# Patient Record
Sex: MALE | Race: WHITE | NOT HISPANIC OR LATINO | Employment: FULL TIME | URBAN - METROPOLITAN AREA
[De-identification: names, ages, dates, MRNs, and addresses within clinical notes are randomized per-mention and may not be internally consistent; named-entity substitution may affect disease eponyms.]

---

## 2018-12-17 PROBLEM — Z98.890 HISTORY OF ENDOSCOPY: Status: ACTIVE | Noted: 2018-12-14

## 2019-02-19 ENCOUNTER — OFFICE VISIT (OUTPATIENT)
Dept: FAMILY MEDICINE | Facility: CLINIC | Age: 29
End: 2019-02-19
Payer: COMMERCIAL

## 2019-02-19 ENCOUNTER — PATIENT MESSAGE (OUTPATIENT)
Dept: FAMILY MEDICINE | Facility: CLINIC | Age: 29
End: 2019-02-19

## 2019-02-19 VITALS
HEART RATE: 109 BPM | TEMPERATURE: 99 F | BODY MASS INDEX: 32.07 KG/M2 | WEIGHT: 224 LBS | SYSTOLIC BLOOD PRESSURE: 129 MMHG | HEIGHT: 70 IN | DIASTOLIC BLOOD PRESSURE: 85 MMHG

## 2019-02-19 DIAGNOSIS — J06.9 UPPER RESPIRATORY TRACT INFECTION, UNSPECIFIED TYPE: Primary | ICD-10-CM

## 2019-02-19 PROCEDURE — 99212 OFFICE O/P EST SF 10 MIN: CPT | Mod: ,,, | Performed by: INTERNAL MEDICINE

## 2019-02-19 PROCEDURE — 99212 PR OFFICE/OUTPT VISIT, EST, LEVL II, 10-19 MIN: ICD-10-PCS | Mod: ,,, | Performed by: INTERNAL MEDICINE

## 2019-02-19 RX ORDER — PREDNISONE 20 MG/1
20 TABLET ORAL DAILY
Qty: 5 TABLET | Refills: 0 | Status: SHIPPED | OUTPATIENT
Start: 2019-02-19 | End: 2019-02-24

## 2019-02-19 NOTE — PATIENT INSTRUCTIONS
Viral Upper Respiratory Illness (Adult)  You have a viral upper respiratory illness (URI), which is another term for the common cold. This illness is contagious during the first few days. It is spread through the air by coughing and sneezing. It may also be spread by direct contact (touching the sick person and then touching your own eyes, nose, or mouth). Frequent handwashing will decrease risk of spread. Most viral illnesses go away within 7 to 10 days with rest and simple home remedies. Sometimes the illness may last for several weeks. Antibiotics will not kill a virus, and they are generally not prescribed for this condition.    Home care  · If symptoms are severe, rest at home for the first 2 to 3 days. When you resume activity, don't let yourself get too tired.  · Avoid being exposed to cigarette smoke (yours or others).  · You may use acetaminophen or ibuprofen to control pain and fever, unless another medicine was prescribed. (Note: If you have chronic liver or kidney disease, have ever had a stomach ulcer or gastrointestinal bleeding, or are taking blood-thinning medicines, talk with your healthcare provider before using these medicines.) Aspirin should never be given to anyone under 18 years of age who is ill with a viral infection or fever. It may cause severe liver or brain damage.  · Your appetite may be poor, so a light diet is fine. Avoid dehydration by drinking 6 to 8 glasses of fluids per day (water, soft drinks, juices, tea, or soup). Extra fluids will help loosen secretions in the nose and lungs.  · Over-the-counter cold medicines will not shorten the length of time youre sick, but they may be helpful for the following symptoms: cough, sore throat, and nasal and sinus congestion. (Note: Do not use decongestants if you have high blood pressure.)  Follow-up care  Follow up with your healthcare provider, or as advised.  When to seek medical advice  Call your healthcare provider right away if any  of these occur:  · Cough with lots of colored sputum (mucus)  · Severe headache; face, neck, or ear pain  · Difficulty swallowing due to throat pain  · Fever of 100.4°F (38°C)  Call 911, or get immediate medical care  Call emergency services right away if any of these occur:  · Chest pain, shortness of breath, wheezing, or difficulty breathing  · Coughing up blood  · Inability to swallow due to throat pain  Date Last Reviewed: 9/13/2015  © 2541-4828 [employer]. [address] [place] [address]. All rights reserved. This information is not intended as a substitute for professional medical care. Always follow your healthcare professional's instructions.

## 2019-02-19 NOTE — PROGRESS NOTES
Subjective:       Patient ID: Swapnil Dillard is a 28 y.o. male.    Chief Complaint: Cough; Otalgia; and URI (upper resp )    Mr. Swapnil Dillard is a 28-year-old  gentleman who comes for same-day evaluation. He is having right-sided ear ache and feeling of cold and cough symptoms. Perhaps he might have had a fever 7 days back. He does not recall any significant or definite of exposure to anybody with influenza. He is nonsmoker. No underlying significant medical issues.    Social history has been reviewed and he has been recently unemployed.    Some mucus which is yellowish greenish or expectoration. No hemoptysis. No chest wall pain. No nausea vomiting or diarrhea.      Cough   This is a new problem. The current episode started in the past 7 days. The problem has been waxing and waning. The cough is productive of sputum. Associated symptoms include ear pain and myalgias. Pertinent negatives include no chest pain, fever (last week had fever), headaches, shortness of breath or weight loss. The treatment provided mild relief. There is no history of asthma, bronchiectasis, bronchitis, COPD, emphysema, environmental allergies or pneumonia.   Otalgia    There is pain in the right ear. This is a new problem. The current episode started in the past 7 days. There has been no fever. Pertinent negatives include no abdominal pain, diarrhea, ear discharge, headaches or hearing loss.       Past Medical History:   Diagnosis Date    Anxiety     Conjunctivitis     Corneal abrasion 2014    Depression     History of endoscopy 12/14/2018    Nissen fundoplication was formed. Wrap appears to be intact. Mild schatzki ring. Dilated. No gross lesions in the duodenal bulb and second part of the duodenum. Dr. 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   Tobacco Use    Smoking status: Never Smoker    Smokeless tobacco: Never Used   Substance and Sexual Activity    Alcohol use: Yes    Drug use: No    Sexual activity: Yes     Partners: Female   Other Topics Concern    Not on file   Social History Narrative    Not on file     Past Surgical History:   Procedure Laterality Date    LAPAROSCOPIC NISSEN FUNDOPLICATION       Family History   Problem Relation Age of Onset    Hypertension Father        Review of Systems   Constitutional: Negative for activity change, appetite change, fatigue, fever (last week had fever), unexpected weight change and weight loss.   HENT: Positive for congestion and ear pain. Negative for drooling, ear discharge, hearing loss, mouth sores and trouble swallowing.    Eyes: Negative for pain and visual disturbance.   Respiratory: Negative for chest tightness and shortness of breath.    Cardiovascular: Negative for chest pain, palpitations and leg swelling.   Gastrointestinal: Negative for abdominal distention, abdominal pain and diarrhea.   Endocrine: Negative for cold intolerance, heat intolerance and polydipsia.   Musculoskeletal: Positive for myalgias. Negative for arthralgias.   Allergic/Immunologic: Negative for environmental allergies, food allergies and immunocompromised state.   Neurological: Negative for dizziness, weakness and headaches.   Psychiatric/Behavioral: The patient is not nervous/anxious.          Objective:      Blood pressure 129/85, pulse 109, temperature 98.5 °F (36.9 °C), height 5' 10" (1.778 m), weight 101.6 kg (224 lb). Body mass index is 32.14 kg/m².  Physical Exam   Constitutional: He appears " well-developed. No distress.   HENT:   Head: Normocephalic and atraumatic.   Right Ear: External ear normal. No mastoid tenderness. Tympanic membrane is injected. A middle ear effusion is present.   Left Ear: External ear normal. No mastoid tenderness. Tympanic membrane is not injected.   Ears:    Mouth/Throat: Mucous membranes are normal. Mucous membranes are not pale and not dry. Posterior oropharyngeal erythema present. No oropharyngeal exudate, posterior oropharyngeal edema or tonsillar abscesses.   Eyes: Right eye exhibits no discharge. Left eye exhibits no discharge. No scleral icterus.   Neck: Normal range of motion. Neck supple. No JVD present. No tracheal deviation present. No thyromegaly present.   Cardiovascular: Normal rate and regular rhythm.   Pulmonary/Chest: Effort normal and breath sounds normal.   Abdominal: Soft. He exhibits no distension. There is no tenderness.   Musculoskeletal: He exhibits no deformity.   Neurological: He is alert.   Skin: Skin is warm and dry. No rash noted. He is not diaphoretic. No erythema.         Assessment:       1. Upper respiratory tract infection, unspecified type           Patient possibly has some otitis media with URI. Most likely viral. He'll be treated conservatively. No antibiotic at this point.      Plan:           Upper respiratory tract infection, unspecified type    Increase your water intake to 64-80 oz daily    Nasal Saline spray (Over the counter King spray or Ayr)  2 sprays each nostril 2-3 times a day for nasal congestion    Tylenol 500 mg 2 tablets or Ibuprofen 200 mg 2 tablets every 4-6 hours as needed for fever, headaches, sore throat, ear pain, bodyaches, and/or nasal/sinus inflammation    Warm salt water gargles with 1/2 cup water and 1 tablespoon salt every 4 hours    Warm tea with honey and lemon    Follow up with PCP in 1 week if symptoms do not resolve        Patient has been advised to send us message through the portal if he is not better  by Friday.  No antibiotics at this point.      No current outpatient medications on file.

## 2019-10-23 ENCOUNTER — OFFICE VISIT (OUTPATIENT)
Dept: FAMILY MEDICINE | Facility: CLINIC | Age: 29
End: 2019-10-23
Payer: COMMERCIAL

## 2019-10-23 VITALS
SYSTOLIC BLOOD PRESSURE: 110 MMHG | DIASTOLIC BLOOD PRESSURE: 76 MMHG | WEIGHT: 219 LBS | HEART RATE: 89 BPM | BODY MASS INDEX: 31.35 KG/M2 | OXYGEN SATURATION: 97 % | HEIGHT: 70 IN

## 2019-10-23 DIAGNOSIS — F41.8 DEPRESSION WITH ANXIETY: ICD-10-CM

## 2019-10-23 DIAGNOSIS — Z00.00 PREVENTATIVE HEALTH CARE: ICD-10-CM

## 2019-10-23 DIAGNOSIS — N52.9 ERECTILE DYSFUNCTION, UNSPECIFIED ERECTILE DYSFUNCTION TYPE: Primary | ICD-10-CM

## 2019-10-23 PROCEDURE — 99214 OFFICE O/P EST MOD 30 MIN: CPT | Mod: S$GLB,,, | Performed by: NURSE PRACTITIONER

## 2019-10-23 PROCEDURE — 99214 PR OFFICE/OUTPT VISIT, EST, LEVL IV, 30-39 MIN: ICD-10-PCS | Mod: S$GLB,,, | Performed by: NURSE PRACTITIONER

## 2019-10-23 NOTE — PATIENT INSTRUCTIONS
Understanding Erectile Dysfunction    Erectile dysfunction (ED) is a problem getting an erection firm enough or keeping it long enough for intercourse. The problem can happen to any man at any age. But health problems that can lead to ED become more common as a man ages. Up to half of men over age 40 experience ED at some point.  Causes of ED  ED can have many causes. Most are physical. Some are emotional issues. Often, a combination of causes is involved. Causes of ED may include:  · Medical conditions such as diabetes or depression  · Smoking tobacco or marijuana  · Drinking too much alcohol  · Side effects of medications  · Injury to nerves or blood vessels  · Emotional issues such as stress or relationship problems  ED can be treated  Prescription medications for ED are available. They help many men who try them. Depending upon the cause of the ED, though, medications may not be enough. In these cases, other treatment options are available. These include erectile aids and surgery. Your health care provider can tell you more about the treatment that is right for you. And new treatments for ED are being studied. No matter what the treatment you decide on, stay in touch with your doctor. If your symptoms persist, he or she may be able to adjust your current treatment or try something new.  Date Last Reviewed: 1/1/2017  © 5180-6687 [employer]. [address] [place] [address], [place] [address]. All rights reserved. This information is not intended as a substitute for professional medical care. Always follow your healthcare professional's instructions.

## 2019-10-23 NOTE — PROGRESS NOTES
SUBJECTIVE:      Patient ID: Swapnil Dillard is a 28 y.o. male.    Chief Complaint: Anxiety and Depression    Mr Dillard is new to me here today to discuss a few concerns. He has a long history of depression/anxiety. Started treatment in high school with counseling and paxil. He was changed to lexapro not long after taking the paxil. He was on lexapro for 12 years and stopped it one year ago due to sexual side effects. For the past year he has been dealing with ED, has tried conservative measures to help but nothing has worked. He is happily  and denies any marital issues except the ED which he says is starting to cause problems. Over the past 6 months he has been under increased stress. Has had 2 new jobs in that short period of time. His PHQ9 is a 16 today. He does not have a suicidal plan and says he would never commit suicide but has days where he feels he would be better dead. He has not had labs in several years and has not gone to counseling in over 5 years.     Anxiety   Presents for initial visit. Onset was more than 5 years ago. The problem has been gradually worsening. Symptoms include decreased concentration, depressed mood, excessive worry, insomnia, irritability, nervous/anxious behavior and panic. Patient reports no chest pain, confusion, dizziness, feeling of choking, palpitations, shortness of breath or suicidal ideas. Symptoms occur most days. The severity of symptoms is moderate. The symptoms are aggravated by work stress and social activities. The quality of sleep is fair. Nighttime awakenings: one to two.     His past medical history is significant for anxiety/panic attacks and depression. Past treatments include SSRIs. The treatment provided mild relief.   Depression   Visit Type: initial  Onset of symptoms: more than 5 years ago  Progression since onset: gradually worsening  Patient presents with the following symptoms: decreased concentration, depressed mood, excessive worry, feelings  of hopelessness, feelings of worthlessness, insomnia, irritability, nervousness/anxiety, panic and thoughts of death.  Patient is not experiencing: choking sensation, confusion, palpitations, shortness of breath, suicidal ideas, suicidal planning and weight gain.  Frequency of symptoms: most days   Severity: moderate   Aggravated by: work stress  Sleep quality: fair  Nighttime awakenings: one to two  Patient has a history of: anxiety/panic attacks and depression  Treatment tried: SSRI  Compliance with treatment: good  Improvement on treatment: mild      Erectile Dysfunction   The current episode started more than 1 year ago. The problem is unchanged. The nature of his difficulty is maintaining erection. Non-physiologic factors contributing to erectile dysfunction are anxiety. He reports his erection duration to be 1 to 5 minutes. Irritative symptoms do not include frequency, nocturia or urgency. Pertinent negatives include no chills or hematuria. The symptoms are aggravated by medications. Past treatments include nothing. 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   Social Needs    Financial resource strain: Not on file    Food insecurity:     Worry: Not on file     Inability: Not on file    Transportation needs:     Medical: Not on file     Non-medical: Not on file   Tobacco Use    Smoking status: Never Smoker    Smokeless tobacco: Never Used   Substance and Sexual Activity    Alcohol use: Yes    Drug use: No    Sexual activity: Yes     Partners: Female   Lifestyle     Physical activity:     Days per week: Not on file     Minutes per session: Not on file    Stress: Not on file   Relationships    Social connections:     Talks on phone: Not on file     Gets together: Not on file     Attends Church service: Not on file     Active member of club or organization: Not on file     Attends meetings of clubs or organizations: Not on file     Relationship status: Not on file   Other Topics Concern    Not on file   Social History Narrative    Not on file     Current Outpatient Medications   Medication Sig Dispense Refill    vilazodone (VIIBRYD) 10 mg (7)- 20 mg (23) DsPk Take 10mg daily for 7 days then 20mg daily 30 tablet 0     No current facility-administered medications for this visit.      Review of patient's allergies indicates:  No Known Allergies   Past Medical History:   Diagnosis Date    Anxiety     Conjunctivitis     Corneal abrasion 2014    Depression     History of endoscopy 12/14/2018    Nissen fundoplication was formed. Wrap appears to be intact. Mild schatzki ring. Dilated. No gross lesions in the duodenal bulb and second part of the duodenum. Dr. Elmira Zaman     Past Surgical History:   Procedure Laterality Date    LAPAROSCOPIC NISSEN FUNDOPLICATION         Review of Systems   Constitutional: Positive for irritability. Negative for appetite change, chills, diaphoresis, unexpected weight change and weight gain.   HENT: Negative for ear discharge, facial swelling, hearing loss, nosebleeds and trouble swallowing.    Eyes: Negative for photophobia, pain and visual disturbance.   Respiratory: Negative for apnea, choking and shortness of breath.    Cardiovascular: Negative for chest pain and palpitations.   Gastrointestinal: Negative for abdominal pain, blood in stool and vomiting.   Endocrine: Negative for polyphagia.   Genitourinary: Negative for difficulty urinating, frequency, hematuria, nocturia and urgency.        ED   Musculoskeletal: Negative for gait  "problem and joint swelling.   Skin: Negative for pallor.   Neurological: Negative for dizziness, seizures, speech difficulty, weakness and headaches.   Hematological: Does not bruise/bleed easily.   Psychiatric/Behavioral: Positive for decreased concentration, depression, dysphoric mood and sleep disturbance. Negative for agitation, confusion, self-injury and suicidal ideas. The patient is nervous/anxious and has insomnia.       OBJECTIVE:      Vitals:    10/23/19 1021   BP: 110/76   Pulse: (!) 117   SpO2: 97%   Weight: 99.3 kg (219 lb)   Height: 5' 10" (1.778 m)     Physical Exam   Constitutional: He is oriented to person, place, and time. He appears well-developed and well-nourished.   HENT:   Head: Atraumatic.   Eyes: Pupils are equal, round, and reactive to light. Conjunctivae and EOM are normal.   Neck: Neck supple. No thyromegaly present.   Cardiovascular: Normal rate, regular rhythm, normal heart sounds and intact distal pulses.   Pulmonary/Chest: Effort normal and breath sounds normal.   Abdominal: Soft. Bowel sounds are normal. He exhibits no distension. There is no tenderness.   Musculoskeletal: Normal range of motion. He exhibits no edema.   Lymphadenopathy:     He has no cervical adenopathy.   Neurological: He is alert and oriented to person, place, and time.   Skin: Skin is warm and dry.   Psychiatric: His speech is normal and behavior is normal. Judgment and thought content normal. Cognition and memory are normal. He exhibits a depressed mood.   Nursing note and vitals reviewed.     Assessment:       1. Erectile dysfunction, unspecified erectile dysfunction type    2. Depression with anxiety    3. Preventative health care        Plan:       Erectile dysfunction, unspecified erectile dysfunction type  -     PSA, Screening; Future; Expected date: 10/23/2019  -     Testosterone; Future; Expected date: 10/23/2019    Depression with anxiety  -   start  vilazodone (VIIBRYD) 10 mg (7)- 20 mg (23) DsPk; Take " 10mg daily for 7 days then 20mg daily  Dispense: 30 tablet; Refill: 0  -     Vitamin B12; Future; Expected date: 10/23/2019  -     Vitamin D; Future; Expected date: 10/23/2019  Long discussion with patient regarding counseling/therapy. He is addiment about not wanting therapy at this time. He feels he is stable and when he was on his medication he feels he was doing well. He would like to restart medication and f/u. He says if he is not improved he will consider therapy. He has no suicidal ideas or plans today. He says he has a good relationship with his wife and would not harm himself.      Preventative health care  -     CBC auto differential; Future; Expected date: 10/23/2019  -     Comprehensive metabolic panel; Future; Expected date: 10/23/2019  -     Lipid panel; Future; Expected date: 10/23/2019  -     TSH; Future; Expected date: 10/23/2019  -     Urinalysis; Future; Expected date: 10/23/2019        Follow up in about 4 weeks (around 11/20/2019) for wellness .      10/23/2019 TOBIN Combs, FNP

## 2019-10-29 ENCOUNTER — TELEPHONE (OUTPATIENT)
Dept: FAMILY MEDICINE | Facility: CLINIC | Age: 29
End: 2019-10-29

## 2019-10-29 NOTE — TELEPHONE ENCOUNTER
The following medication needs a prior authorization:     Medication Name: vilazodone    Dosage: 10-20mg mg    Frequency: daily    Directions for use: take 10mg daily for 7 days then 20mg daily.    Diagnosis: F41.8 Depression with anxiety    Is the request for a reauthorization? no    Is the patient currently stable on therapy? New therapy    Please list all therapeutic alternatives previously used with start/end dates and outcome:    Lexapro [id number] cause erectile dysfunction

## 2019-11-15 ENCOUNTER — TELEPHONE (OUTPATIENT)
Dept: FAMILY MEDICINE | Facility: CLINIC | Age: 29
End: 2019-11-15

## 2019-11-15 LAB
25(OH)D3+25(OH)D2 SERPL-MCNC: 19.2 NG/ML (ref 30–100)
ALBUMIN SERPL-MCNC: 4.4 G/DL (ref 3.5–5.5)
ALBUMIN/GLOB SERPL: 1.6 {RATIO} (ref 1.2–2.2)
ALP SERPL-CCNC: 71 IU/L (ref 39–117)
ALT SERPL-CCNC: 22 IU/L (ref 0–44)
APPEARANCE UR: CLEAR
AST SERPL-CCNC: 18 IU/L (ref 0–40)
BASOPHILS # BLD AUTO: 0 X10E3/UL (ref 0–0.2)
BASOPHILS NFR BLD AUTO: 1 %
BILIRUB SERPL-MCNC: 0.3 MG/DL (ref 0–1.2)
BILIRUB UR QL STRIP: NEGATIVE
BUN SERPL-MCNC: 11 MG/DL (ref 6–20)
BUN/CREAT SERPL: 10 (ref 9–20)
CALCIUM SERPL-MCNC: 9.4 MG/DL (ref 8.7–10.2)
CHLORIDE SERPL-SCNC: 100 MMOL/L (ref 96–106)
CHOLEST SERPL-MCNC: 217 MG/DL (ref 100–199)
CO2 SERPL-SCNC: 23 MMOL/L (ref 20–29)
COLOR UR: YELLOW
CREAT SERPL-MCNC: 1.09 MG/DL (ref 0.76–1.27)
EOSINOPHIL # BLD AUTO: 0.1 X10E3/UL (ref 0–0.4)
EOSINOPHIL NFR BLD AUTO: 2 %
ERYTHROCYTE [DISTWIDTH] IN BLOOD BY AUTOMATED COUNT: 13.5 % (ref 12.3–15.4)
GLOBULIN SER CALC-MCNC: 2.7 G/DL (ref 1.5–4.5)
GLUCOSE SERPL-MCNC: 106 MG/DL (ref 65–99)
GLUCOSE UR QL: NEGATIVE
HCT VFR BLD AUTO: 44.3 % (ref 37.5–51)
HDLC SERPL-MCNC: 54 MG/DL
HGB BLD-MCNC: 15 G/DL (ref 13–17.7)
HGB UR QL STRIP: NEGATIVE
IMM GRANULOCYTES # BLD AUTO: 0 X10E3/UL (ref 0–0.1)
IMM GRANULOCYTES NFR BLD AUTO: 0 %
KETONES UR QL STRIP: NEGATIVE
LDLC SERPL CALC-MCNC: 131 MG/DL (ref 0–99)
LEUKOCYTE ESTERASE UR QL STRIP: NEGATIVE
LYMPHOCYTES # BLD AUTO: 2.1 X10E3/UL (ref 0.7–3.1)
LYMPHOCYTES NFR BLD AUTO: 33 %
MCH RBC QN AUTO: 31.1 PG (ref 26.6–33)
MCHC RBC AUTO-ENTMCNC: 33.9 G/DL (ref 31.5–35.7)
MCV RBC AUTO: 92 FL (ref 79–97)
MICRO URNS: NORMAL
MONOCYTES # BLD AUTO: 0.5 X10E3/UL (ref 0.1–0.9)
MONOCYTES NFR BLD AUTO: 8 %
NEUTROPHILS # BLD AUTO: 3.5 X10E3/UL (ref 1.4–7)
NEUTROPHILS NFR BLD AUTO: 56 %
NITRITE UR QL STRIP: NEGATIVE
PH UR STRIP: 7.5 [PH] (ref 5–7.5)
PLATELET # BLD AUTO: 285 X10E3/UL (ref 150–450)
POTASSIUM SERPL-SCNC: 4.5 MMOL/L (ref 3.5–5.2)
PROT SERPL-MCNC: 7.1 G/DL (ref 6–8.5)
PROT UR QL STRIP: NEGATIVE
PSA SERPL-MCNC: 1 NG/ML (ref 0–4)
RBC # BLD AUTO: 4.83 X10E6/UL (ref 4.14–5.8)
SODIUM SERPL-SCNC: 140 MMOL/L (ref 134–144)
SP GR UR: 1.02 (ref 1–1.03)
SPECIMEN STATUS REPORT: NORMAL
TESTOST SERPL-MCNC: 364 NG/DL (ref 264–916)
TRIGL SERPL-MCNC: 160 MG/DL (ref 0–149)
TSH SERPL DL<=0.005 MIU/L-ACNC: 2.25 UIU/ML (ref 0.45–4.5)
UROBILINOGEN UR STRIP-MCNC: 0.2 MG/DL (ref 0.2–1)
VIT B12 SERPL-MCNC: 256 PG/ML (ref 232–1245)
VLDLC SERPL CALC-MCNC: 32 MG/DL (ref 5–40)
WBC # BLD AUTO: 6.3 X10E3/UL (ref 3.4–10.8)

## 2019-11-18 ENCOUNTER — OFFICE VISIT (OUTPATIENT)
Dept: FAMILY MEDICINE | Facility: CLINIC | Age: 29
End: 2019-11-18
Payer: COMMERCIAL

## 2019-11-18 VITALS
HEIGHT: 70 IN | HEART RATE: 90 BPM | SYSTOLIC BLOOD PRESSURE: 100 MMHG | OXYGEN SATURATION: 96 % | WEIGHT: 219 LBS | BODY MASS INDEX: 31.35 KG/M2 | DIASTOLIC BLOOD PRESSURE: 80 MMHG

## 2019-11-18 DIAGNOSIS — E78.00 ELEVATED LDL CHOLESTEROL LEVEL: ICD-10-CM

## 2019-11-18 DIAGNOSIS — E53.8 VITAMIN B 12 DEFICIENCY: ICD-10-CM

## 2019-11-18 DIAGNOSIS — Z00.00 PREVENTATIVE HEALTH CARE: Primary | ICD-10-CM

## 2019-11-18 DIAGNOSIS — F41.8 DEPRESSION WITH ANXIETY: ICD-10-CM

## 2019-11-18 DIAGNOSIS — E55.9 VITAMIN D DEFICIENCY: ICD-10-CM

## 2019-11-18 PROCEDURE — 99395 PREV VISIT EST AGE 18-39: CPT | Mod: S$GLB,,, | Performed by: NURSE PRACTITIONER

## 2019-11-18 PROCEDURE — 99395 PR PREVENTIVE VISIT,EST,18-39: ICD-10-PCS | Mod: S$GLB,,, | Performed by: NURSE PRACTITIONER

## 2019-11-18 RX ORDER — VILAZODONE HYDROCHLORIDE 20 MG/1
20 TABLET ORAL DAILY
Qty: 30 TABLET | Refills: 3 | Status: SHIPPED | OUTPATIENT
Start: 2019-11-18 | End: 2020-03-23

## 2019-11-18 RX ORDER — ASPIRIN 325 MG
50000 TABLET, DELAYED RELEASE (ENTERIC COATED) ORAL
Qty: 8 CAPSULE | Refills: 0 | Status: SHIPPED | OUTPATIENT
Start: 2019-11-18 | End: 2021-08-20 | Stop reason: ALTCHOICE

## 2019-11-18 NOTE — PATIENT INSTRUCTIONS
Low-Cholesterol Diet  Your body needs cholesterol to build new cells and create certain hormones. There are 2 kinds of cholesterol in your blood:     · HDL (good) cholesterol. This prevents fat deposits (plaque) from building up in your arteries. In this way it protects against heart disease and stroke.  · LDL (bad) cholesterol. This stays in your body and sticks to artery walls. Over time it may block blood flow to the heart and brain. This can cause a heart attack or stroke.  The cholesterol in your blood comes from 2 sources: cholesterol in food that you eat and cholesterol that your liver makes. You should limit the amount of cholesterol in your diet. But the cholesterol that your body makes has the greatest disease risk. And your body makes more cholesterol when your diet is high in bad fats (saturated and trans fats). There are 2 kinds of fats you can eat:  · Good fats, or unsaturated fats (mono-unsaturated and poly-unsaturated). They raise the level of good cholesterol and lower the level of bad cholesterol. Good fats are found in vegetable oils such as olive, sunflower, corn, and soybean oils, and in nuts and seeds.  · Bad fats, or saturated fats (including foods high in cholesterol) and trans fats. These raise your risk of disease. They lower the good cholesterol and raise the level of bad cholesterol. Bad fats are found in animal products, including meat, whole-milk dairy products, and butter. Some plants are also high in bad fats (coconut and palm plants). Trans fats are found in hard (stick) margarines. They are also in many fast foods and commercially baked goods. Soft margarine sold in tubs has fewer trans fats and is safer to use.  High blood cholesterol is usually due to a diet high in saturated fat, along with not being physically active. In some cases, genetics plays a role in causing high cholesterol. The tips below will help you create healthy eating habits that will help lower your blood  cholesterol level.  Create a diet high in good fats, low in bad fats (and low in cholesterol)  The following steps will help you create a diet high in good fats and low in bad fats:  · Talk with your doctor before starting a low cholesterol diet or weight loss program.  · Learn to read nutrition labels and select appropriate portion sizes.  · When cooking, use plant-based unsaturated vegetable oils (sunflower, corn, soybean, canola, peanut, and olive oils).  · Avoid saturated fats found in animal products such as meat, dairy (whole-milk, cheese and ice cream), poultry skin, and egg yolks. Plants high in saturated oils include coconut oil, palm oil, and palm kernel oil.  · If you eat meat, choose smaller portions and lean cuts, such as round, kristel, sirloin, or loin. Eat more meatless meals.  · Replace meat with fish at least 2 times a week. Fish is an important source of the unsaturated fat called omega-3 fatty acids. This fat has potential to lower the risk of heart disease.  · Replace whole-milk dairy products with low-fat or nonfat products. Try soy products. Soy helps to reduce total cholesterol.  · Supplement your diet with protective fibers. Eat nuts, seeds, and whole grains rather than white rice and bread. These foods lower both cholesterol and triglyceride levels. (Triglycerides are another fat found in the blood.) Walnuts are one of the best sources of omega-3 fatty acids.  · Eat plenty of fresh fruits and vegetables daily.  · Avoid fast foods and commercial baked goods. Assume they contain saturated fat unless labeled otherwise.  Date Last Reviewed: 8/1/2016  © 6725-8535 [employer]. [address] [place] [address], [place] [address]. All rights reserved. This information is not intended as a substitute for professional medical care. Always follow your healthcare professional's instructions.

## 2019-11-18 NOTE — PROGRESS NOTES
SUBJECTIVE:      Patient ID: Swapnil Dillard is a 29 y.o. male.    Chief Complaint: Annual Exam    Mr Dillard is here today for his annual exam and f/u on depression/anxiety. He is doing well on the viibryd. PHQ is a 2 today. He is not having any side effects from the medication. Vit D and B12 low on labs, will discuss replacement with patient. Lipids a little elevated, will discuss diet modification     Anxiety   Presents for follow-up visit. Onset was more than 5 years ago. The problem has been gradually worsening. Symptoms include depressed mood and excessive worry. Patient reports no chest pain, confusion, decreased concentration, dizziness, feeling of choking, insomnia, irritability, nervous/anxious behavior, palpitations, panic, shortness of breath or suicidal ideas. Symptoms occur occasionally. The severity of symptoms is mild. The symptoms are aggravated by work stress and social activities. The quality of sleep is good. Nighttime awakenings: occasional.     His past medical history is significant for depression. Past treatments include SSRIs. The treatment provided mild relief. Compliance with medications is %.   Depression   Visit Type: follow-up  Patient presents with the following symptoms: depressed mood and excessive worry.  Patient is not experiencing: choking sensation, confusion, decreased concentration, feelings of hopelessness, feelings of worthlessness, insomnia, irritability, nervousness/anxiety, palpitations, panic, shortness of breath, suicidal ideas, suicidal planning, thoughts of death and weight gain.  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   Social Needs    Financial resource strain: Not on file    Food insecurity:     Worry: Not on file     Inability: Not on file    Transportation needs:     Medical: Not on file     Non-medical: Not on file   Tobacco Use    Smoking status: Never Smoker    Smokeless tobacco: Never Used   Substance and Sexual Activity    Alcohol use: Yes    Drug use: No    Sexual activity: Yes     Partners: Female   Lifestyle    Physical activity:     Days per week: Not on file     Minutes per session: Not on file    Stress: Not on file   Relationships    Social connections:     Talks on phone: Not on file     Gets together: Not on file     Attends [employer] service: Not on file     Active member of club or organization: Not on file     Attends meetings of clubs or organizations: Not on file     Relationship status: Not on file   Other Topics Concern    Not on file   Social History Narrative    Not on file     Current Outpatient Medications   Medication Sig Dispense Refill    cholecalciferol, vitamin D3, 50,000 unit capsule Take 1 capsule (50,000 Units total) by mouth every 7 days. 8 capsule 0    vilazodone (VIIBRYD) 20 mg Tab Take 1 tablet (20 mg total) by mouth once daily. 30 tablet 3     No current facility-administered medications for this visit.      Review of patient's allergies indicates:  No Known Allergies   Past Medical History:   Diagnosis Date    Anxiety     Conjunctivitis     Corneal abrasion 2014    Depression     History of endoscopy 12/14/2018    Nissen fundoplication was formed. Wrap appears to be intact. Mild schatzki ring. Dilated. No gross lesions in the duodenal bulb and second part of the duodenum. Dr. Elmira Zaman     Past Surgical History:   Procedure Laterality Date     "LAPAROSCOPIC NISSEN FUNDOPLICATION         Review of Systems   Constitutional: Negative for appetite change, diaphoresis, irritability, unexpected weight change and weight gain.   HENT: Negative for ear discharge, facial swelling, hearing loss, nosebleeds and trouble swallowing.    Eyes: Negative for photophobia, pain and visual disturbance.   Respiratory: Negative for apnea, choking, shortness of breath and wheezing.    Cardiovascular: Negative for chest pain and palpitations.   Gastrointestinal: Negative for abdominal pain, blood in stool and vomiting.   Endocrine: Negative for polyphagia.   Genitourinary: Negative for difficulty urinating.   Musculoskeletal: Negative for arthralgias, gait problem and joint swelling.   Skin: Negative for pallor.   Neurological: Negative for dizziness, seizures and speech difficulty.   Hematological: Does not bruise/bleed easily.   Psychiatric/Behavioral: Positive for depression. Negative for agitation, confusion, decreased concentration, dysphoric mood, self-injury, sleep disturbance and suicidal ideas. The patient is not nervous/anxious and does not have insomnia.       OBJECTIVE:      Vitals:    11/18/19 0935   BP: 100/80   Pulse: 90   SpO2: 96%   Weight: 99.3 kg (219 lb)   Height: 5' 10" (1.778 m)     Physical Exam   Constitutional: He is oriented to person, place, and time. He appears well-developed and well-nourished.   HENT:   Head: Atraumatic.   Eyes: Conjunctivae are normal.   Neck: Neck supple.   Cardiovascular: Normal rate, regular rhythm, normal heart sounds and intact distal pulses.   Pulmonary/Chest: Effort normal and breath sounds normal.   Abdominal: Soft. Bowel sounds are normal. He exhibits no distension.   Musculoskeletal: Normal range of motion.   Neurological: He is alert and oriented to person, place, and time.   Skin: Skin is warm and dry.   Psychiatric: He has a normal mood and affect. His speech is normal and behavior is normal. Thought content normal. "   Nursing note and vitals reviewed.      Office Visit on 10/23/2019   Component Date Value Ref Range Status    WBC 11/14/2019 6.3  3.4 - 10.8 x10E3/uL Final    RBC 11/14/2019 4.83  4.14 - 5.80 x10E6/uL Final    Hemoglobin 11/14/2019 15.0  13.0 - 17.7 g/dL Final    Hematocrit 11/14/2019 44.3  37.5 - 51.0 % Final    Mean Corpuscular Volume 11/14/2019 92  79 - 97 fL Final    Mean Corpuscular Hemoglobin 11/14/2019 31.1  26.6 - 33.0 pg Final    Mean Corpuscular Hemoglobin Conc 11/14/2019 33.9  31.5 - 35.7 g/dL Final    RDW 11/14/2019 13.5  12.3 - 15.4 % Final    Platelets 11/14/2019 285  150 - 450 x10E3/uL Final    Neutrophils 11/14/2019 56  Not Estab. % Final    Lymph% 11/14/2019 33  Not Estab. % Final    Mono% 11/14/2019 8  Not Estab. % Final    Eosinophil% 11/14/2019 2  Not Estab. % Final    Basophil% 11/14/2019 1  Not Estab. % Final    Neutrophils Absolute 11/14/2019 3.5  1.4 - 7.0 x10E3/uL Final    Lymph # 11/14/2019 2.1  0.7 - 3.1 x10E3/uL Final    Mono # 11/14/2019 0.5  0.1 - 0.9 x10E3/uL Final    Eos # 11/14/2019 0.1  0.0 - 0.4 x10E3/uL Final    Baso # 11/14/2019 0.0  0.0 - 0.2 x10E3/uL Final    Immature Granulocytes 11/14/2019 0  Not Estab. % Final    Immature Grans (Abs) 11/14/2019 0.0  0.0 - 0.1 x10E3/uL Final    Glucose 11/14/2019 106* 65 - 99 mg/dL Final    BUN, Bld 11/14/2019 11  6 - 20 mg/dL Final    Creatinine 11/14/2019 1.09  0.76 - 1.27 mg/dL Final    eGFR if non African American 11/14/2019 90  >59 mL/min/1.73 Final    eGFR if  11/14/2019 104  >59 mL/min/1.73 Final    BUN/Creatinine Ratio 11/14/2019 10  9 - 20 Final    Sodium 11/14/2019 140  134 - 144 mmol/L Final    Potassium 11/14/2019 4.5  3.5 - 5.2 mmol/L Final    Chloride 11/14/2019 100  96 - 106 mmol/L Final    CO2 11/14/2019 23  20 - 29 mmol/L Final    Calcium 11/14/2019 9.4  8.7 - 10.2 mg/dL Final    Total Protein 11/14/2019 7.1  6.0 - 8.5 g/dL Final    Albumin 11/14/2019 4.4  3.5 - 5.5 g/dL  Final    Globulin, Total 11/14/2019 2.7  1.5 - 4.5 g/dL Final    Albumin/Globulin Ratio 11/14/2019 1.6  1.2 - 2.2 Final    Total Bilirubin 11/14/2019 0.3  0.0 - 1.2 mg/dL Final    Alkaline Phosphatase 11/14/2019 71  39 - 117 IU/L Final    AST 11/14/2019 18  0 - 40 IU/L Final    ALT 11/14/2019 22  0 - 44 IU/L Final    Cholesterol 11/14/2019 217* 100 - 199 mg/dL Final    Triglycerides 11/14/2019 160* 0 - 149 mg/dL Final    HDL 11/14/2019 54  >39 mg/dL Final    VLDL Cholesterol [place] 11/14/2019 32  5 - 40 mg/dL Final    LDL Calculated 11/14/2019 131* 0 - 99 mg/dL Final    TSH 11/14/2019 2.250  0.450 - 4.500 uIU/mL Final    Specific [place], UA 11/14/2019 1.020  1.005 - 1.030 Final    pH, UA 11/14/2019 7.5  5.0 - 7.5 Final    Color, UA 11/14/2019 Yellow  Yellow Final    Clarity, UA 11/14/2019 Clear  Clear Final    Leukocytes, UA 11/14/2019 Negative  Negative Final    Protein, UA 11/14/2019 Negative  Negative/Trace Final    Glucose, UA 11/14/2019 Negative  Negative Final    Ketones, UA 11/14/2019 Negative  Negative Final    Occult Blood UA 11/14/2019 Negative  Negative Final    Bilirubin, UA 11/14/2019 Negative  Negative Final    Urobilinogen, UA 11/14/2019 0.2  0.2 - 1.0 mg/dL Final    Nitrite, UA 11/14/2019 Negative  Negative Final    Microscopic Examination 11/14/2019 Comment   Final    Microscopic not indicated and not performed.    PSA - [facility] 11/14/2019 1.0  0.0 - 4.0 ng/mL Final    Comment: Roche ECLIA methodology.  According to the American Urological Association, Serum PSA should  decrease and remain at undetectable levels after radical  prostatectomy. The AUA defines biochemical recurrence as an initial  PSA value 0.2 ng/mL or greater followed by a subsequent confirmatory  PSA value 0.2 ng/mL or greater.  Values obtained with different assay methods or kits cannot be used  interchangeably. Results cannot be interpreted as absolute evidence  of the presence or absence of malignant  disease.      Testosterone 11/14/2019 364  264 - 916 ng/dL Final    Comment: Adult male reference interval is based on a population of  healthy nonobese males (BMI <30) between 19 and 39 years old.  Taqueria, et.al. JCEM 2017,102;9002-6831. PMID: 75516465.      Vitamin B-12 11/14/2019 256  232 - 1,245 pg/mL Final    Vit D, 25-Hydroxy 11/14/2019 19.2* 30.0 - 100.0 ng/mL Final    Comment: Vitamin D deficiency has been defined by the [place] of  [employer] and an Endocrine Society practice guideline as a  level of serum 25-OH vitamin D less than 20 ng/mL (1,2).  The Endocrine Society went on to further define vitamin D  insufficiency as a level between 21 and 29 ng/mL (2).  1. IOM ([place] of [employer]). 2010. Dietary reference     intakes for calcium and D. [place]: The     National Academies Press.  2. Sol MF, Adonis NC, Kweis BRADY, et al.     Evaluation, treatment, and prevention of vitamin D     deficiency: an Endocrine Society clinical practice     guideline. JCEM. 2011 Jul; 96(7):1911-30.      Specimen Status Report 11/14/2019 Comment   Final    Comment: Verbal Order  See below:  Comment:  Please provide requested information and fax to [phone].  The [place] Code of Federal Regulations requires a written and  signed request be forwarded to a laboratory following a verbal order  of a laboratory test.  Please assist us to meet this requirement and  to complete our records.  Date:______________________________  ICD-9/10 Diagnosis Code(s):___________________________________________  Physician or Authorized Designee:_____________________________________                                                Please Print  Physician or Authorized Designee Signature:  ______________________________________________________________________  Your Signature Confirms Your Order Of The Test(s) Listed  Additional Test(s) Requested  Comment:  Test(s) added per ANNIE ARBOLEDA at account  11-  Logged by Mary Lara  Test# [id number] Hemoglobin A1c  Diagnosis Codes Provided     N52.9      Z00.00     F41.8      Hemoglobin A1C 11/14/2019 5.6  4.8 - 5.6 % Final    Comment:          Prediabetes: 5.7 - 6.4           Diabetes: >6.4           Glycemic control for adults with diabetes: <7.0      Specimen Status Report 11/14/2019 Comment   Preliminary    Comment: Written Authorization  Written Authorization     ]  Assessment:       1. Preventative health care    2. Depression with anxiety    3. Vitamin D deficiency    4. Elevated LDL cholesterol level    5. Vitamin B 12 deficiency        Plan:       Preventative health care  Labs reviewed with patient    Depression with anxiety  -     vilazodone (VIIBRYD) 20 mg Tab; Take 1 tablet (20 mg total) by mouth once daily.  Dispense: 30 tablet; Refill: 3    Vitamin D deficiency  -  start  cholecalciferol, vitamin D3, 50,000 unit capsule; Take 1 capsule (50,000 Units total) by mouth every 7 days.  Dispense: 8 capsule; Refill: 0  -     Vitamin D; Future; Expected date: 02/18/2020    Elevated LDL cholesterol level  -     Lipid panel; Future; Expected date: 02/18/2020  Low cholesterol diet printed    Vitamin B 12 deficiency  -     Vitamin B12; Future; Expected date: 02/18/2020  Vit B12 1000 mcg otc      Follow up in about 3 months (around 2/18/2020) for depression/anxiety .      11/18/2019 TOBIN Combs, FNP

## 2019-11-19 ENCOUNTER — OFFICE VISIT (OUTPATIENT)
Dept: URGENT CARE | Facility: CLINIC | Age: 29
End: 2019-11-19
Payer: OTHER GOVERNMENT

## 2019-11-19 VITALS
DIASTOLIC BLOOD PRESSURE: 86 MMHG | WEIGHT: 218 LBS | BODY MASS INDEX: 31.21 KG/M2 | HEIGHT: 70 IN | RESPIRATION RATE: 18 BRPM | SYSTOLIC BLOOD PRESSURE: 137 MMHG | OXYGEN SATURATION: 95 % | TEMPERATURE: 98 F | HEART RATE: 104 BPM

## 2019-11-19 DIAGNOSIS — M25.511 ACUTE PAIN OF RIGHT SHOULDER: Primary | ICD-10-CM

## 2019-11-19 LAB
HBA1C MFR BLD: 5.6 % (ref 4.8–5.6)
SPECIMEN STATUS REPORT: NORMAL

## 2019-11-19 PROCEDURE — 73030 X-RAY EXAM OF SHOULDER: CPT | Mod: RT,S$GLB,, | Performed by: NURSE PRACTITIONER

## 2019-11-19 PROCEDURE — 73030 PR  X-RAY SHOULDER 2+ VW: ICD-10-PCS | Mod: RT,S$GLB,, | Performed by: NURSE PRACTITIONER

## 2019-11-19 PROCEDURE — 99204 PR OFFICE/OUTPT VISIT, NEW, LEVL IV, 45-59 MIN: ICD-10-PCS | Mod: 25,S$GLB,, | Performed by: NURSE PRACTITIONER

## 2019-11-19 PROCEDURE — 99204 OFFICE O/P NEW MOD 45 MIN: CPT | Mod: 25,S$GLB,, | Performed by: NURSE PRACTITIONER

## 2019-11-19 RX ORDER — KETOROLAC TROMETHAMINE 30 MG/ML
30 INJECTION, SOLUTION INTRAMUSCULAR; INTRAVENOUS
Status: SHIPPED | OUTPATIENT
Start: 2019-11-19 | End: 2019-11-24

## 2019-11-19 RX ORDER — DICLOFENAC SODIUM 50 MG/1
50 TABLET, DELAYED RELEASE ORAL 2 TIMES DAILY
Qty: 20 TABLET | Refills: 0 | Status: SHIPPED | OUTPATIENT
Start: 2019-11-19 | End: 2019-11-29

## 2019-11-19 NOTE — PATIENT INSTRUCTIONS
02950    R.I.C.E.    R.I.C.E. stands for Rest, Ice, Compression, and Elevation. Doing these things helps limit pain and swelling after an injury. R.I.C.E. also helps injuries heal faster. Use R.I.C.E. for sprains, strains, and severe bruises or bumps. Follow the tips on this handout and begin R.I.C.E. as soon as possible after an injury.  ? Rest  Pain is your bodys way of telling you to rest an injured area. Whether you have hurt an elbow, hand, foot, or knee, limiting its use will prevent further injury and help you heal.  ? Ice  Applying ice right after an injury helps prevent swelling and reduce pain. Dont place ice directly on your skin.  · Wrap a cold pack or bag of ice in a thin cloth. Place it over the injured area.  · Ice for 10 minutes every 3 hours. Dont ice for more than 20 minutes at a time.  ? Compression  Putting pressure (compression) on an injury helps prevent swelling and provides support.  · Wrap the injured area firmly with an elastic bandage. If your hand or foot tingles, becomes discolored, or feels cold to the touch, the bandage may be too tight. Rewrap it more loosely.  · If your bandage becomes too loose, rewrap it.  · Do not wear an elastic bandage overnight.  ? Elevation  Keeping an injury elevated helps reduce swelling, pain, and throbbing. Elevation is most effective when the injury is kept elevated higher than the heart.     Call your healthcare provider if you notice any of the following:  · Fingers or toes feel numb, are cold to the touch, or change color  · Skin looks shiny or tight  · Pain, swelling, or bruising worsens and is not improved with elevation   Date Last Reviewed: 9/3/2015  © 9972-1939 [employer]. [address] [place] [address], [place] [address]. All rights reserved. This information is not intended as a substitute for professional medical care. Always follow your healthcare professional's instructions.

## 2019-11-19 NOTE — PROGRESS NOTES
"Subjective:       Patient ID: Swapnil Dillard is a 29 y.o. male.    Vitals:  height is 5' 10" (1.778 m) and weight is 98.9 kg (218 lb). His oral temperature is 97.7 °F (36.5 °C). His blood pressure is 137/86 and his pulse is 104. His respiration is 18 and oxygen saturation is 95%.     Chief Complaint: Shoulder Pain (R)    DOI 11/10/19: Patient reports 9 days ago he was walking to his truck, rolled ankle and fell landed on R arm and R knee. The fall caused R shoulder pain.    Shoulder Pain    The pain is present in the right shoulder. This is a new problem. The current episode started 1 to 4 weeks ago. There has been no history of extremity trauma. The problem occurs constantly. The problem has been gradually worsening. The quality of the pain is described as aching and burning. Associated symptoms include a limited range of motion, numbness and tingling. Pertinent negatives include no fever or headaches. He has tried nothing for the symptoms. The treatment provided no relief.       Constitution: Negative for chills, fatigue and fever.   HENT: Negative for congestion and sore throat.    Neck: Negative for painful lymph nodes.   Cardiovascular: Negative for chest pain and leg swelling.   Eyes: Negative for double vision and blurred vision.   Respiratory: Negative for cough and shortness of breath.    Gastrointestinal: Negative for abdominal pain, nausea, vomiting and diarrhea.   Genitourinary: Negative for dysuria, frequency and urgency.   Musculoskeletal: Positive for abnormal ROM of joint. Negative for joint pain, joint swelling, muscle cramps and muscle ache.   Skin: Negative for color change, pale and rash.   Allergic/Immunologic: Negative for seasonal allergies.   Neurological: Positive for numbness. Negative for dizziness, history of vertigo, light-headedness, passing out and headaches.   Hematologic/Lymphatic: Negative for swollen lymph nodes, easy bruising/bleeding and history of blood clots. Does not " bruise/bleed easily.   Psychiatric/Behavioral: Negative for nervous/anxious, sleep disturbance and depression. The patient is not nervous/anxious.        Objective:      Physical Exam   Constitutional: He is oriented to person, place, and time. He appears well-developed and well-nourished. He is cooperative.  Non-toxic appearance. He does not appear ill. No distress.   HENT:   Head: Normocephalic and atraumatic.   Right Ear: Hearing, tympanic membrane, external ear and ear canal normal.   Left Ear: Hearing, tympanic membrane, external ear and ear canal normal.   Nose: Nose normal. No mucosal edema, rhinorrhea or nasal deformity. No epistaxis. Right sinus exhibits no maxillary sinus tenderness and no frontal sinus tenderness. Left sinus exhibits no maxillary sinus tenderness and no frontal sinus tenderness.   Mouth/Throat: Uvula is midline, oropharynx is clear and moist and mucous membranes are normal. No trismus in the jaw. Normal dentition. No uvula swelling. No posterior oropharyngeal erythema.   Eyes: Conjunctivae and lids are normal. Right eye exhibits no discharge. Left eye exhibits no discharge. No scleral icterus.   Neck: Trachea normal, normal range of motion, full passive range of motion without pain and phonation normal. Neck supple.   Cardiovascular: Normal rate, regular rhythm, normal heart sounds, intact distal pulses and normal pulses.   Pulmonary/Chest: Effort normal and breath sounds normal. No respiratory distress.   Abdominal: Soft. Normal appearance and bowel sounds are normal. He exhibits no distension, no pulsatile midline mass and no mass. There is no tenderness.   Musculoskeletal: He exhibits no edema or deformity.        Right shoulder: He exhibits decreased range of motion, tenderness, bony tenderness and pain. He exhibits no swelling, no effusion, no crepitus, no deformity, no laceration, no spasm, normal pulse and normal strength.   Neurological: He is alert and oriented to person, place,  and time. He exhibits normal muscle tone. Coordination normal. GCS eye subscore is 4. GCS verbal subscore is 5. GCS motor subscore is 6.   Skin: Skin is warm, dry, intact, not diaphoretic and not pale.   Psychiatric: He has a normal mood and affect. His speech is normal and behavior is normal. Judgment and thought content normal. Cognition and memory are normal.   Nursing note and vitals reviewed.        Assessment:       1. Acute pain of right shoulder        Plan:       Xr was negative. Discussed the importance of R.I.C.E. NSAIDs given for pain and inflammation.  Discussed reasons to return and importance of followup. All questions addressed and patient given discharge instructions and followup information.    Acute pain of right shoulder  -     XR SHOULDER COMPLETE 2 OR MORE VIEWS RIGHT; Future; Expected date: 11/19/2019    Other orders  -     ketorolac injection 30 mg  -     diclofenac (VOLTAREN) 50 MG EC tablet; Take 1 tablet (50 mg total) by mouth 2 (two) times daily. for 10 days  Dispense: 20 tablet; Refill: 0

## 2019-11-24 ENCOUNTER — OFFICE VISIT (OUTPATIENT)
Dept: URGENT CARE | Facility: CLINIC | Age: 29
End: 2019-11-24
Payer: OTHER GOVERNMENT

## 2019-11-24 VITALS
OXYGEN SATURATION: 96 % | BODY MASS INDEX: 31.21 KG/M2 | WEIGHT: 218 LBS | HEIGHT: 70 IN | HEART RATE: 117 BPM | TEMPERATURE: 97 F | RESPIRATION RATE: 20 BRPM | DIASTOLIC BLOOD PRESSURE: 70 MMHG | SYSTOLIC BLOOD PRESSURE: 125 MMHG

## 2019-11-24 DIAGNOSIS — S43.401D SPRAIN OF RIGHT SHOULDER, UNSPECIFIED SHOULDER SPRAIN TYPE, SUBSEQUENT ENCOUNTER: Primary | ICD-10-CM

## 2019-11-24 PROCEDURE — 99214 PR OFFICE/OUTPT VISIT, EST, LEVL IV, 30-39 MIN: ICD-10-PCS | Mod: S$GLB,,, | Performed by: NURSE PRACTITIONER

## 2019-11-24 PROCEDURE — 99214 OFFICE O/P EST MOD 30 MIN: CPT | Mod: S$GLB,,, | Performed by: NURSE PRACTITIONER

## 2019-11-24 NOTE — PROGRESS NOTES
"Subjective:       Patient ID: Swapnil Dillard is a 29 y.o. male.    Vitals:  vitals were not taken for this visit.     Chief Complaint: Work Related Injury    W/C F/U DOI- 11/6/19 -  Pt states "right shoulder still hurting pretty badly , can move arm a little bit more but when trying to lift anything still hurts"  Been using ice and heat, and anti- inflammatory but no relief         Constitution: Negative for fever.   Musculoskeletal: Positive for pain and trauma.   Skin: Negative for color change.       Objective:      Physical Exam   Constitutional: He is oriented to person, place, and time. He appears well-developed and well-nourished.   HENT:   Head: Normocephalic.   Neck: Normal range of motion.   Cardiovascular: Normal rate.   Pulmonary/Chest: Effort normal.   Musculoskeletal:   R shoulder with limited ROM due to pain apprehension. + painful internal ROM   Neurological: He is alert and oriented to person, place, and time.   Psychiatric: He has a normal mood and affect.   Nursing note and vitals reviewed.        Assessment:       1. Sprain of right shoulder, unspecified shoulder sprain type, subsequent encounter        Plan:         Sprain of right shoulder, unspecified shoulder sprain type, subsequent encounter    Pt with DOI of 11/10/19 rolling ankle and catching himself with R shoulder pain, seen and evaluated, xray (-), NSAID use without improvement and symptoms 14 days ago, Pt to FU with orthopedics.        "

## 2019-11-24 NOTE — PATIENT INSTRUCTIONS
Shoulder Sprain  A sprain is a stretching or tearing of the ligaments that hold a joint together. A sprain may take up to 8 weeks to fully heal, depending on how severe it is. Moderate to severe shoulder sprains are treated with a sling or shoulder immobilizer. Minor sprains can be treated without any special support.  Home care  The following guidelines will help you care for your injury at home:  · If a sling was given to you, leave it in place for the time advised by your healthcare provider. If you arent sure how long to wear it, ask for advice. If the sling becomes loose, adjust it so that your forearm is level with the ground. Your shoulder should feel well supported.  · Put an ice pack on the injured area for 20 minutes every 1 to 2 hours the first day. You can make your own ice pack by putting ice cubes in a plastic bag. A bag of frozen peas or something similar works well too. Wrap the bag in a thin towel. Continue with ice packs 3 to 4 times a day for the next 2 to 3 days. Then use the pack as needed to ease pain and swelling.  · You may use acetaminophen or ibuprofen to control pain, unless another pain medicine was prescribed. If you have chronic liver or kidney disease, talk with your healthcare provider before using these medicines. Also talk with your provider if youve had a stomach ulcer or gastrointestinal bleeding.  · Shoulder joints become stiff if left in a sling for too long. You should start range of motion exercises about 7 to 10 days after the injury. Talk with your provider to find out what type of exercises to do and how soon to start.  Follow-up care  Follow up with your healthcare provider, or as advised.  Any X-rays you had today dont show any broken bones, breaks, or fractures. Sometimes fractures dont show up on the first X-ray. Bruises and sprains can sometimes hurt as much as a fracture. These injuries can take time to heal completely. If your symptoms dont improve or they get  worse, talk with your provider. You may need a repeat X-ray or other treatments.  When to seek medical advice  Call your healthcare provider right away if any of these occur:  · Shoulder pain or swelling in your arm that gets worse  · Fingers become cold, blue, numb, or tingly  · Large amount of bruising of the shoulder or upper arm  · Fever or chills  Date Last Reviewed: 8/1/2016  © 7875-3859 [employer]. [address] [place] [address], [place] [address]. All rights reserved. This information is not intended as a substitute for professional medical care. Always follow your healthcare professional's instructions.

## 2020-01-09 DIAGNOSIS — E55.9 VITAMIN D DEFICIENCY: ICD-10-CM

## 2020-01-09 RX ORDER — ASPIRIN 325 MG
TABLET, DELAYED RELEASE (ENTERIC COATED) ORAL
Qty: 8 CAPSULE | Refills: 0 | OUTPATIENT
Start: 2020-01-09

## 2020-01-09 NOTE — TELEPHONE ENCOUNTER
Auto refill from pharmacy for Vit D 3 50,000 units. Please check with patient. He should have finished the 8 weeks and started on 2000 units daily otc

## 2020-01-28 ENCOUNTER — TELEPHONE (OUTPATIENT)
Dept: FAMILY MEDICINE | Facility: CLINIC | Age: 30
End: 2020-01-28

## 2020-01-28 DIAGNOSIS — E78.00 ELEVATED LDL CHOLESTEROL LEVEL: Primary | ICD-10-CM

## 2020-01-28 DIAGNOSIS — E53.8 VITAMIN B 12 DEFICIENCY: ICD-10-CM

## 2020-01-28 DIAGNOSIS — R73.09 ELEVATED GLUCOSE: ICD-10-CM

## 2020-01-28 DIAGNOSIS — R53.83 FATIGUE, UNSPECIFIED TYPE: ICD-10-CM

## 2020-01-28 DIAGNOSIS — E55.9 VITAMIN D DEFICIENCY: ICD-10-CM

## 2020-01-28 DIAGNOSIS — F41.8 DEPRESSION WITH ANXIETY: ICD-10-CM

## 2020-01-28 NOTE — TELEPHONE ENCOUNTER
Pt is getting a $1000 bill for the labs he had done in November. He is asking if there are any dx we can add

## 2020-01-28 NOTE — LETTER
January 29, 2020    Swapnil Dillard  [address]  [facility] [address]             [facility] - Service  Family / Internal Medicine  [address]  [facility] [address]  Phone: [phone]  Fax: [phone]  I would like to add the ICD-10 diagnosis codes listed below to the patents [facility] date of service of 11/14/2019.    E78.00- Elevated LVL cholesterol level  E53.8- Vitamin B12 deficiency  E55.9- Vitamin D deficiency  F41.8- Depression with anxiety  R73.09- Elevated glucose  R53.83- Fatigue, unspecified type    Electronically signed by ROMY Conecpcion

## 2020-01-29 NOTE — TELEPHONE ENCOUNTER
Spoke with Carla at [facility] . Pts insurance requires a copy of the office visit along with a list of what dx codes we want added on letter head. The ov note from 11/18 was printed and a formal letter was created with list of dx codes and they have been faxed to [facility]. [facility] was told that [facility], the paperwork will be resubmitted to the pts ins company to rework the claim.  Faxed ppw to [phone]    [facility] has been started and [facility].

## 2020-03-23 DIAGNOSIS — F41.8 DEPRESSION WITH ANXIETY: ICD-10-CM

## 2020-03-23 RX ORDER — VILAZODONE HYDROCHLORIDE 20 MG/1
TABLET ORAL
Qty: 30 TABLET | Refills: 3 | Status: SHIPPED | OUTPATIENT
Start: 2020-03-23 | End: 2020-07-22

## 2020-07-22 DIAGNOSIS — F41.8 DEPRESSION WITH ANXIETY: ICD-10-CM

## 2020-07-22 RX ORDER — VILAZODONE HYDROCHLORIDE 20 MG/1
TABLET ORAL
Qty: 30 TABLET | Refills: 0 | Status: SHIPPED | OUTPATIENT
Start: 2020-07-22 | End: 2020-08-18 | Stop reason: SDUPTHER

## 2020-08-17 DIAGNOSIS — F41.8 DEPRESSION WITH ANXIETY: ICD-10-CM

## 2020-08-17 RX ORDER — VILAZODONE HYDROCHLORIDE 20 MG/1
TABLET ORAL
Qty: 30 TABLET | Refills: 0 | OUTPATIENT
Start: 2020-08-17

## 2020-08-18 ENCOUNTER — OFFICE VISIT (OUTPATIENT)
Dept: FAMILY MEDICINE | Facility: CLINIC | Age: 30
End: 2020-08-18
Payer: COMMERCIAL

## 2020-08-18 VITALS
HEART RATE: 110 BPM | SYSTOLIC BLOOD PRESSURE: 110 MMHG | WEIGHT: 244 LBS | BODY MASS INDEX: 34.93 KG/M2 | HEIGHT: 70 IN | TEMPERATURE: 98 F | DIASTOLIC BLOOD PRESSURE: 80 MMHG | OXYGEN SATURATION: 95 %

## 2020-08-18 DIAGNOSIS — F41.8 DEPRESSION WITH ANXIETY: Primary | ICD-10-CM

## 2020-08-18 DIAGNOSIS — E55.9 VITAMIN D DEFICIENCY: ICD-10-CM

## 2020-08-18 DIAGNOSIS — E53.8 VITAMIN B 12 DEFICIENCY: ICD-10-CM

## 2020-08-18 DIAGNOSIS — Z00.00 PREVENTATIVE HEALTH CARE: ICD-10-CM

## 2020-08-18 DIAGNOSIS — R73.9 HYPERGLYCEMIA: ICD-10-CM

## 2020-08-18 PROCEDURE — 99214 PR OFFICE/OUTPT VISIT, EST, LEVL IV, 30-39 MIN: ICD-10-PCS | Mod: S$GLB,,, | Performed by: NURSE PRACTITIONER

## 2020-08-18 PROCEDURE — 99214 OFFICE O/P EST MOD 30 MIN: CPT | Mod: S$GLB,,, | Performed by: NURSE PRACTITIONER

## 2020-08-18 RX ORDER — VILAZODONE HYDROCHLORIDE 20 MG/1
1 TABLET ORAL DAILY
Qty: 90 TABLET | Refills: 1 | Status: SHIPPED | OUTPATIENT
Start: 2020-08-18 | End: 2020-12-17 | Stop reason: SDUPTHER

## 2020-08-18 NOTE — PROGRESS NOTES
SUBJECTIVE:      Patient ID: Swapnil Dillard is a 29 y.o. male.    Chief Complaint: Depression and Anxiety    Mr Dillard is here today to f/u on depression/anxiety. He is doing well on the viibryd. PHQ is a 0 today. He is not having any side effects from the medication. He has been taking his vitamin supplements. Was doing well, eating right then gained weight during covid. He has was laid off during covid and recently interviewed for a job, he is hopeful to get back into a normal routine soong    Depression  Visit Type: follow-up  Patient presents with the following symptoms: depressed mood and excessive worry.  Patient is not experiencing: choking sensation, confusion, decreased concentration, feelings of hopelessness, feelings of worthlessness, insomnia, irritability, nervousness/anxiety, palpitations, panic, shortness of breath, suicidal ideas, suicidal planning, thoughts of death and weight gain.  Frequency of symptoms: occasionally   Severity: mild   Sleep quality: good  Nighttime awakenings: none  Compliance with medications:  %        Anxiety  Presents for follow-up visit. Symptoms include depressed mood and excessive worry. Patient reports no chest pain, confusion, decreased concentration, dizziness, feeling of choking, insomnia, irritability, nervous/anxious behavior, palpitations, panic, shortness of breath or suicidal ideas. Symptoms occur occasionally. The severity of symptoms is mild. The quality of sleep is good. Nighttime awakenings: occasional.     Compliance with medications is %.       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   Social Needs    Financial resource strain: Not on file    Food insecurity     Worry: Not on file     Inability: Not on file    Transportation needs     Medical: Not on file     Non-medical: Not on file   Tobacco Use    Smoking status: Never Smoker    Smokeless tobacco: Never Used   Substance and Sexual Activity    Alcohol use: Yes    Drug use: No    Sexual activity: Yes     Partners: Female   Lifestyle    Physical activity     Days per week: Not on file     Minutes per session: Not on file    Stress: Not on file   Relationships    Social connections     Talks on phone: Not on file     Gets together: Not on file     Attends Mandaeism service: Not on file     Active member of club or organization: Not on file     Attends meetings of clubs or organizations: Not on file     Relationship status: Not on file   Other Topics Concern    Not on file   Social History Narrative    Not on file     Current Outpatient Medications   Medication Sig Dispense Refill    cholecalciferol, vitamin D3, 50,000 unit capsule Take 1 capsule (50,000 Units total) by mouth every 7 days. 8 capsule 0    vilazodone (VIIBRYD) 20 mg Tab Take 1 tablet (20 mg total) by mouth once daily. 90 tablet 1     No current facility-administered medications for this visit.      Review of patient's allergies indicates:  No Known Allergies   Past Medical History:   Diagnosis Date    Anxiety     Conjunctivitis     Corneal abrasion 2014    Depression     History of endoscopy 12/14/2018    Nissen fundoplication was formed. Wrap appears to be intact. Mild schatzki ring. Dilated. No gross lesions in the duodenal bulb and second part of the duodenum. Dr. Elmira Zaman     Past Surgical History:   Procedure Laterality Date    LAPAROSCOPIC NISSEN FUNDOPLICATION         Review of Systems   Constitutional: Negative for appetite change, chills, diaphoresis, irritability, unexpected weight change and  "weight gain.   HENT: Negative for ear discharge, facial swelling, hearing loss, nosebleeds and trouble swallowing.    Eyes: Negative for photophobia, pain and visual disturbance.   Respiratory: Negative for apnea, choking, shortness of breath and wheezing.    Cardiovascular: Negative for chest pain and palpitations.   Gastrointestinal: Negative for abdominal pain, blood in stool and vomiting.   Endocrine: Negative for polyphagia.   Genitourinary: Negative for difficulty urinating and hematuria.   Musculoskeletal: Negative for gait problem and joint swelling.   Skin: Negative for pallor.   Neurological: Negative for dizziness, seizures, speech difficulty, light-headedness and headaches.   Hematological: Does not bruise/bleed easily.   Psychiatric/Behavioral: Positive for depression. Negative for agitation, confusion, decreased concentration, dysphoric mood, self-injury, sleep disturbance and suicidal ideas. The patient is not nervous/anxious and does not have insomnia.       OBJECTIVE:      Vitals:    08/18/20 0958   BP: 110/80   Pulse: 110   Temp: 98.4 °F (36.9 °C)   TempSrc: Skin   SpO2: 95%   Weight: 110.7 kg (244 lb)   Height: 5' 10" (1.778 m)     Physical Exam  Vitals signs and nursing note reviewed.   Constitutional:       Appearance: He is well-developed.   HENT:      Head: Atraumatic.   Eyes:      Conjunctiva/sclera: Conjunctivae normal.   Neck:      Musculoskeletal: Neck supple.   Cardiovascular:      Rate and Rhythm: Normal rate and regular rhythm.      Pulses: Normal pulses.      Heart sounds: Normal heart sounds.   Pulmonary:      Effort: Pulmonary effort is normal.      Breath sounds: Normal breath sounds.   Abdominal:      General: Bowel sounds are normal. There is no distension.      Palpations: Abdomen is soft.   Musculoskeletal: Normal range of motion.   Skin:     General: Skin is warm and dry.   Neurological:      Mental Status: He is alert and oriented to person, place, and time.   Psychiatric:    "      Mood and Affect: Mood normal.         Behavior: Behavior normal.         Thought Content: Thought content normal.         Judgment: Judgment normal.        Assessment:       1. Depression with anxiety    2. Vitamin B 12 deficiency    3. Vitamin D deficiency    4. Hyperglycemia    5. Preventative health care        Plan:       Depression with anxiety  -     vilazodone (VIIBRYD) 20 mg Tab; Take 1 tablet (20 mg total) by mouth once daily.  Dispense: 90 tablet; Refill: 1    Vitamin B 12 deficiency  -     Vitamin B12; Future; Expected date: 08/18/2020    Vitamin D deficiency  -     Vitamin D; Future; Expected date: 08/18/2020    Hyperglycemia  -     Hemoglobin A1C; Future; Expected date: 08/18/2020    Preventative health care  -     CBC auto differential; Future; Expected date: 08/18/2020  -     Comprehensive metabolic panel; Future; Expected date: 08/18/2020  -     Lipid Panel; Future; Expected date: 08/18/2020  -     TSH; Future; Expected date: 08/18/2020  -     Urinalysis; Future; Expected date: 08/18/2020        Follow up in about 4 months (around 12/18/2020) for wellness .      8/18/2020 TOBIN Combs, FNP

## 2020-08-18 NOTE — PATIENT INSTRUCTIONS
4 Steps for Eating Healthier  Changing the way you eat can improve your health. It can lower your cholesterol and blood pressure, and help you stay at a healthy weight. Your diet doesnt have to be bland and boring to be healthy. Just watch your calories and follow these steps:    1. Eat fewer unhealthy fats  · Choose more fish and lean meats instead of fatty cuts of meat.  · Skip butter and lard, and use less margarine.  · Pass on foods that have palm, coconut, or hydrogenated oils.  · Eat fewer high-fat dairy foods like cheese, ice cream, and whole milk.  · Get a heart-healthy cookbook and try some low-fat recipes.  2. Go light on salt  · Keep the saltshaker off the table.  · Limit high-salt ingredients, such as soy sauce, bouillon, and garlic salt.  · Instead of adding salt when cooking, season your food with herbs and flavorings. Try lemon, garlic, and onion.  · Limit convenience foods, such as boxed or canned foods and restaurant food.  · Read food labels and choose lower-sodium options.  3. Limit sugar  · Pause before you add sugars to pancakes, cereal, coffee, or tea. This includes white and brown table sugar, syrup, honey, and molasses. Cut your usual amount by half.  · Use non-sugar sweeteners. Stevia, aspartame, and sucralose can satisfy a sweet tooth without adding calories.  · Swap out sugar-filled soda and other drinks. Buy sugar-free or low-calorie beverages. Remember water is always the best choice.  · Read labels and choose foods with less added sugar. Keep in mind that dairy foods and foods with fruit will have some natural sugar.  · Cut the sugar in recipes by 1/3 to 1/2. Boost the flavor with extracts like almond, vanilla, or orange. Or add spices such as cinnamon or nutmeg.  4. Eat more fiber  · Eat fresh fruits and vegetables every day.  · Boost your diet with whole grains. Go for oats, whole-grain rice, and bran.  · Add beans and lentils to your meals.  · Drink more water to match your fiber  increase. This is to help prevent constipation.  Date Last Reviewed: 5/11/2015  © 4720-1170 [employer]. [address] [place] [address], [place] [address]. All rights reserved. This information is not intended as a substitute for professional medical care. Always follow your healthcare professional's instructions.

## 2020-12-17 ENCOUNTER — OFFICE VISIT (OUTPATIENT)
Dept: FAMILY MEDICINE | Facility: CLINIC | Age: 30
End: 2020-12-17
Payer: COMMERCIAL

## 2020-12-17 VITALS
HEART RATE: 89 BPM | HEIGHT: 70 IN | WEIGHT: 242 LBS | BODY MASS INDEX: 34.65 KG/M2 | SYSTOLIC BLOOD PRESSURE: 100 MMHG | OXYGEN SATURATION: 96 % | DIASTOLIC BLOOD PRESSURE: 80 MMHG | TEMPERATURE: 98 F

## 2020-12-17 DIAGNOSIS — F41.8 DEPRESSION WITH ANXIETY: ICD-10-CM

## 2020-12-17 DIAGNOSIS — Z00.00 PREVENTATIVE HEALTH CARE: Primary | ICD-10-CM

## 2020-12-17 PROCEDURE — 3008F PR BODY MASS INDEX (BMI) DOCUMENTED: ICD-10-PCS | Mod: S$GLB,,, | Performed by: NURSE PRACTITIONER

## 2020-12-17 PROCEDURE — 3008F BODY MASS INDEX DOCD: CPT | Mod: S$GLB,,, | Performed by: NURSE PRACTITIONER

## 2020-12-17 PROCEDURE — 99395 PR PREVENTIVE VISIT,EST,18-39: ICD-10-PCS | Mod: S$GLB,,, | Performed by: NURSE PRACTITIONER

## 2020-12-17 PROCEDURE — 99395 PREV VISIT EST AGE 18-39: CPT | Mod: S$GLB,,, | Performed by: NURSE PRACTITIONER

## 2020-12-17 RX ORDER — VILAZODONE HYDROCHLORIDE 20 MG/1
1 TABLET ORAL DAILY
Qty: 90 TABLET | Refills: 1 | Status: SHIPPED | OUTPATIENT
Start: 2020-12-17 | End: 2021-08-20 | Stop reason: SDUPTHER

## 2020-12-28 ENCOUNTER — TELEPHONE (OUTPATIENT)
Dept: FAMILY MEDICINE | Facility: CLINIC | Age: 30
End: 2020-12-28

## 2020-12-28 NOTE — TELEPHONE ENCOUNTER
----- Message from Gabi Barcenas sent at 12/28/2020 10:49 AM CST -----  Approved on December 24 for Stephanie WILKINS Case: [id number], Status: Approved, Coverage Starts on: 12/24/2020 12:00:00 AM, Coverage Ends on: 12/31/2021 12:00:00 AM. Questions? Contact [phone].    Thanks     Christen

## 2021-02-01 ENCOUNTER — TELEPHONE (OUTPATIENT)
Dept: FAMILY MEDICINE | Facility: CLINIC | Age: 31
End: 2021-02-01

## 2021-02-01 DIAGNOSIS — G47.30 SLEEP APNEA, UNSPECIFIED TYPE: Primary | ICD-10-CM

## 2021-03-03 ENCOUNTER — OFFICE VISIT (OUTPATIENT)
Dept: PULMONOLOGY | Facility: CLINIC | Age: 31
End: 2021-03-03
Payer: COMMERCIAL

## 2021-03-03 VITALS
WEIGHT: 247 LBS | HEIGHT: 70 IN | DIASTOLIC BLOOD PRESSURE: 80 MMHG | TEMPERATURE: 99 F | OXYGEN SATURATION: 97 % | SYSTOLIC BLOOD PRESSURE: 120 MMHG | BODY MASS INDEX: 35.36 KG/M2 | HEART RATE: 105 BPM

## 2021-03-03 DIAGNOSIS — G47.30 SLEEP APNEA, UNSPECIFIED TYPE: ICD-10-CM

## 2021-03-03 PROCEDURE — 99214 PR OFFICE/OUTPT VISIT, EST, LEVL IV, 30-39 MIN: ICD-10-PCS | Mod: S$GLB,,, | Performed by: NURSE PRACTITIONER

## 2021-03-03 PROCEDURE — 1126F PR PAIN SEVERITY QUANTIFIED, NO PAIN PRESENT: ICD-10-PCS | Mod: S$GLB,,, | Performed by: NURSE PRACTITIONER

## 2021-03-03 PROCEDURE — 99214 OFFICE O/P EST MOD 30 MIN: CPT | Mod: S$GLB,,, | Performed by: NURSE PRACTITIONER

## 2021-03-03 PROCEDURE — 1126F AMNT PAIN NOTED NONE PRSNT: CPT | Mod: S$GLB,,, | Performed by: NURSE PRACTITIONER

## 2021-03-10 ENCOUNTER — TELEPHONE (OUTPATIENT)
Dept: PULMONOLOGY | Facility: CLINIC | Age: 31
End: 2021-03-10

## 2021-03-16 ENCOUNTER — TELEPHONE (OUTPATIENT)
Dept: PULMONOLOGY | Facility: CLINIC | Age: 31
End: 2021-03-16

## 2021-03-16 DIAGNOSIS — G47.33 OSA (OBSTRUCTIVE SLEEP APNEA): Primary | ICD-10-CM

## 2021-03-22 ENCOUNTER — TELEPHONE (OUTPATIENT)
Dept: PULMONOLOGY | Facility: CLINIC | Age: 31
End: 2021-03-22

## 2021-03-22 DIAGNOSIS — G47.33 OSA (OBSTRUCTIVE SLEEP APNEA): Primary | ICD-10-CM

## 2021-03-24 ENCOUNTER — PROCEDURE VISIT (OUTPATIENT)
Dept: SLEEP MEDICINE | Facility: HOSPITAL | Age: 31
End: 2021-03-24
Attending: NURSE PRACTITIONER
Payer: COMMERCIAL

## 2021-03-24 DIAGNOSIS — G47.33 OSA (OBSTRUCTIVE SLEEP APNEA): ICD-10-CM

## 2021-03-24 PROCEDURE — 95806 SLEEP STUDY UNATT&RESP EFFT: CPT

## 2021-03-31 ENCOUNTER — TELEPHONE (OUTPATIENT)
Dept: PULMONOLOGY | Facility: CLINIC | Age: 31
End: 2021-03-31

## 2021-03-31 ENCOUNTER — PATIENT MESSAGE (OUTPATIENT)
Dept: PULMONOLOGY | Facility: CLINIC | Age: 31
End: 2021-03-31

## 2021-03-31 DIAGNOSIS — G47.33 OSA (OBSTRUCTIVE SLEEP APNEA): Primary | ICD-10-CM

## 2021-04-29 ENCOUNTER — PATIENT MESSAGE (OUTPATIENT)
Dept: RESEARCH | Facility: HOSPITAL | Age: 31
End: 2021-04-29

## 2021-05-21 ENCOUNTER — CLINICAL SUPPORT (OUTPATIENT)
Dept: OTHER | Facility: CLINIC | Age: 31
End: 2021-05-21
Payer: COMMERCIAL

## 2021-05-21 DIAGNOSIS — Z00.8 ENCOUNTER FOR OTHER GENERAL EXAMINATION: ICD-10-CM

## 2021-05-22 VITALS — HEIGHT: 70 IN | BODY MASS INDEX: 35.44 KG/M2

## 2021-05-22 LAB
HDLC SERPL-MCNC: 50 MG/DL
POC CHOLESTEROL, LDL (DOCK): 140 MG/DL
POC CHOLESTEROL, TOTAL: 212 MG/DL
POC GLUCOSE, FASTING: 111 MG/DL (ref 60–110)
TRIGL SERPL-MCNC: 111 MG/DL

## 2021-08-20 ENCOUNTER — OFFICE VISIT (OUTPATIENT)
Dept: FAMILY MEDICINE | Facility: CLINIC | Age: 31
End: 2021-08-20
Payer: COMMERCIAL

## 2021-08-20 VITALS
HEIGHT: 70 IN | DIASTOLIC BLOOD PRESSURE: 78 MMHG | WEIGHT: 250 LBS | HEART RATE: 98 BPM | SYSTOLIC BLOOD PRESSURE: 100 MMHG | TEMPERATURE: 98 F | OXYGEN SATURATION: 96 % | BODY MASS INDEX: 35.79 KG/M2

## 2021-08-20 DIAGNOSIS — F41.8 DEPRESSION WITH ANXIETY: Primary | ICD-10-CM

## 2021-08-20 DIAGNOSIS — R73.09 ELEVATED GLUCOSE: ICD-10-CM

## 2021-08-20 DIAGNOSIS — Z00.00 PREVENTATIVE HEALTH CARE: ICD-10-CM

## 2021-08-20 DIAGNOSIS — E55.9 VITAMIN D DEFICIENCY: ICD-10-CM

## 2021-08-20 DIAGNOSIS — E53.8 VITAMIN B 12 DEFICIENCY: ICD-10-CM

## 2021-08-20 PROCEDURE — 1160F RVW MEDS BY RX/DR IN RCRD: CPT | Mod: S$GLB,,, | Performed by: NURSE PRACTITIONER

## 2021-08-20 PROCEDURE — 1160F PR REVIEW ALL MEDS BY PRESCRIBER/CLIN PHARMACIST DOCUMENTED: ICD-10-PCS | Mod: S$GLB,,, | Performed by: NURSE PRACTITIONER

## 2021-08-20 PROCEDURE — 3008F BODY MASS INDEX DOCD: CPT | Mod: S$GLB,,, | Performed by: NURSE PRACTITIONER

## 2021-08-20 PROCEDURE — 99214 OFFICE O/P EST MOD 30 MIN: CPT | Mod: S$GLB,,, | Performed by: NURSE PRACTITIONER

## 2021-08-20 PROCEDURE — 3008F PR BODY MASS INDEX (BMI) DOCUMENTED: ICD-10-PCS | Mod: S$GLB,,, | Performed by: NURSE PRACTITIONER

## 2021-08-20 PROCEDURE — 99214 PR OFFICE/OUTPT VISIT, EST, LEVL IV, 30-39 MIN: ICD-10-PCS | Mod: S$GLB,,, | Performed by: NURSE PRACTITIONER

## 2021-08-20 RX ORDER — VILAZODONE HYDROCHLORIDE 20 MG/1
1 TABLET ORAL DAILY
Qty: 90 TABLET | Refills: 1 | Status: SHIPPED | OUTPATIENT
Start: 2021-08-20 | End: 2022-02-14

## 2021-11-02 ENCOUNTER — OFFICE VISIT (OUTPATIENT)
Dept: OPTOMETRY | Facility: CLINIC | Age: 31
End: 2021-11-02
Payer: COMMERCIAL

## 2021-11-02 DIAGNOSIS — H52.7 REFRACTIVE ERROR: ICD-10-CM

## 2021-11-02 DIAGNOSIS — Z46.0 CONTACT LENS/GLASSES FITTING: Primary | ICD-10-CM

## 2021-11-02 DIAGNOSIS — Z01.00 EXAMINATION OF EYES AND VISION: Primary | ICD-10-CM

## 2021-11-02 PROCEDURE — 1160F RVW MEDS BY RX/DR IN RCRD: CPT | Mod: CPTII,S$GLB,, | Performed by: OPTOMETRIST

## 2021-11-02 PROCEDURE — 1159F PR MEDICATION LIST DOCUMENTED IN MEDICAL RECORD: ICD-10-PCS | Mod: CPTII,S$GLB,, | Performed by: OPTOMETRIST

## 2021-11-02 PROCEDURE — 92015 DETERMINE REFRACTIVE STATE: CPT | Mod: S$GLB,,, | Performed by: OPTOMETRIST

## 2021-11-02 PROCEDURE — 92004 COMPRE OPH EXAM NEW PT 1/>: CPT | Mod: S$GLB,,, | Performed by: OPTOMETRIST

## 2021-11-02 PROCEDURE — 92015 PR REFRACTION: ICD-10-PCS | Mod: S$GLB,,, | Performed by: OPTOMETRIST

## 2021-11-02 PROCEDURE — 92004 PR EYE EXAM, NEW PATIENT,COMPREHESV: ICD-10-PCS | Mod: S$GLB,,, | Performed by: OPTOMETRIST

## 2021-11-02 PROCEDURE — 92310 CONTACT LENS FITTING OU: CPT | Mod: CSM,,, | Performed by: OPTOMETRIST

## 2021-11-02 PROCEDURE — 99999 PR PBB SHADOW E&M-EST. PATIENT-LVL II: ICD-10-PCS | Mod: PBBFAC,,, | Performed by: OPTOMETRIST

## 2021-11-02 PROCEDURE — 99999 PR PBB SHADOW E&M-EST. PATIENT-LVL II: CPT | Mod: PBBFAC,,, | Performed by: OPTOMETRIST

## 2021-11-02 PROCEDURE — 1160F PR REVIEW ALL MEDS BY PRESCRIBER/CLIN PHARMACIST DOCUMENTED: ICD-10-PCS | Mod: CPTII,S$GLB,, | Performed by: OPTOMETRIST

## 2021-11-02 PROCEDURE — 1159F MED LIST DOCD IN RCRD: CPT | Mod: CPTII,S$GLB,, | Performed by: OPTOMETRIST

## 2021-11-02 PROCEDURE — 92310 PR CONTACT LENS FITTING (NO CHANGE): ICD-10-PCS | Mod: CSM,,, | Performed by: OPTOMETRIST

## 2021-11-02 PROCEDURE — 99499 NO LOS: ICD-10-PCS | Mod: S$GLB,,, | Performed by: OPTOMETRIST

## 2021-11-02 PROCEDURE — 99499 UNLISTED E&M SERVICE: CPT | Mod: S$GLB,,, | Performed by: OPTOMETRIST

## 2021-11-10 ENCOUNTER — TELEPHONE (OUTPATIENT)
Dept: OPTOMETRY | Facility: CLINIC | Age: 31
End: 2021-11-10
Payer: COMMERCIAL

## 2021-11-18 ENCOUNTER — TELEPHONE (OUTPATIENT)
Dept: OPTOMETRY | Facility: CLINIC | Age: 31
End: 2021-11-18
Payer: COMMERCIAL

## 2021-11-30 ENCOUNTER — OFFICE VISIT (OUTPATIENT)
Dept: OPTOMETRY | Facility: CLINIC | Age: 31
End: 2021-11-30
Payer: COMMERCIAL

## 2021-11-30 DIAGNOSIS — Z46.0 CONTACT LENS/GLASSES FITTING: Primary | ICD-10-CM

## 2021-11-30 PROCEDURE — 99499 UNLISTED E&M SERVICE: CPT | Mod: S$GLB,,, | Performed by: OPTOMETRIST

## 2021-11-30 PROCEDURE — 99499 NO LOS: ICD-10-PCS | Mod: S$GLB,,, | Performed by: OPTOMETRIST

## 2022-02-21 ENCOUNTER — TELEPHONE (OUTPATIENT)
Dept: FAMILY MEDICINE | Facility: CLINIC | Age: 32
End: 2022-02-21

## 2022-02-21 ENCOUNTER — OFFICE VISIT (OUTPATIENT)
Dept: FAMILY MEDICINE | Facility: CLINIC | Age: 32
End: 2022-02-21
Payer: COMMERCIAL

## 2022-02-21 VITALS
TEMPERATURE: 98 F | SYSTOLIC BLOOD PRESSURE: 110 MMHG | BODY MASS INDEX: 36.65 KG/M2 | WEIGHT: 256 LBS | DIASTOLIC BLOOD PRESSURE: 80 MMHG | HEART RATE: 104 BPM | HEIGHT: 70 IN | OXYGEN SATURATION: 95 %

## 2022-02-21 DIAGNOSIS — R73.09 ELEVATED GLUCOSE: ICD-10-CM

## 2022-02-21 DIAGNOSIS — F41.8 DEPRESSION WITH ANXIETY: ICD-10-CM

## 2022-02-21 DIAGNOSIS — Z11.59 NEED FOR HEPATITIS C SCREENING TEST: ICD-10-CM

## 2022-02-21 DIAGNOSIS — E53.8 VITAMIN B 12 DEFICIENCY: ICD-10-CM

## 2022-02-21 DIAGNOSIS — E55.9 VITAMIN D DEFICIENCY: ICD-10-CM

## 2022-02-21 DIAGNOSIS — Z00.00 PREVENTATIVE HEALTH CARE: Primary | ICD-10-CM

## 2022-02-21 LAB — HBA1C MFR BLD: 5.6 %

## 2022-02-21 PROCEDURE — 83036 HEMOGLOBIN GLYCOSYLATED A1C: CPT | Mod: QW,,, | Performed by: NURSE PRACTITIONER

## 2022-02-21 PROCEDURE — 3008F PR BODY MASS INDEX (BMI) DOCUMENTED: ICD-10-PCS | Mod: S$GLB,,, | Performed by: NURSE PRACTITIONER

## 2022-02-21 PROCEDURE — 83036 POCT HEMOGLOBIN A1C: ICD-10-PCS | Mod: QW,,, | Performed by: NURSE PRACTITIONER

## 2022-02-21 PROCEDURE — 99395 PR PREVENTIVE VISIT,EST,18-39: ICD-10-PCS | Mod: S$GLB,,, | Performed by: NURSE PRACTITIONER

## 2022-02-21 PROCEDURE — 3008F BODY MASS INDEX DOCD: CPT | Mod: S$GLB,,, | Performed by: NURSE PRACTITIONER

## 2022-02-21 PROCEDURE — 99395 PREV VISIT EST AGE 18-39: CPT | Mod: S$GLB,,, | Performed by: NURSE PRACTITIONER

## 2022-02-21 PROCEDURE — 1160F PR REVIEW ALL MEDS BY PRESCRIBER/CLIN PHARMACIST DOCUMENTED: ICD-10-PCS | Mod: S$GLB,,, | Performed by: NURSE PRACTITIONER

## 2022-02-21 PROCEDURE — 1160F RVW MEDS BY RX/DR IN RCRD: CPT | Mod: S$GLB,,, | Performed by: NURSE PRACTITIONER

## 2022-02-21 RX ORDER — VILAZODONE HYDROCHLORIDE 20 MG/1
1 TABLET ORAL DAILY
Qty: 30 TABLET | Refills: 0 | Status: SHIPPED | OUTPATIENT
Start: 2022-02-21 | End: 2022-04-11

## 2022-02-21 NOTE — PROGRESS NOTES
SUBJECTIVE:      Patient ID: Swapnil Dillard is a 31 y.o. male.    Chief Complaint: Anxiety and Depression    Mr Dillard is here today for his annual exam. He was doing well on the viibryd but his insurance has denied viibryd and he is waiting to have it authorized. He has been on lexapro in the past with multiple side effects. He is due for his routine labs. He has been taking his vitamin supplements.  Previous work labs showed an elevated glucose,  A1c 5.6 today     Anxiety  Presents for follow-up visit. Onset was more than 5 years ago. The problem has been gradually worsening. Symptoms include depressed mood and excessive worry. Patient reports no chest pain, confusion, decreased concentration, dizziness, feeling of choking, insomnia, irritability, nervous/anxious behavior, palpitations, panic, shortness of breath or suicidal ideas. Symptoms occur occasionally. The severity of symptoms is mild. The symptoms are aggravated by work stress and social activities. The quality of sleep is good. Nighttime awakenings: occasional.     His past medical history is significant for depression. Past treatments include SSRIs. The treatment provided mild relief. Compliance with medications is %.   Depression  Visit Type: follow-up  Patient presents with the following symptoms: depressed mood and excessive worry.  Patient is not experiencing: choking sensation, confusion, decreased concentration, feelings of hopelessness, feelings of worthlessness, insomnia, irritability, nervousness/anxiety, palpitations, panic, shortness of breath, suicidal ideas, suicidal planning, thoughts of death and weight gain.  Frequency of symptoms: occasionally   Severity: mild   Sleep quality: good  Nighttime awakenings: occasional  Compliance with medications:  %            Past Surgical History:   Procedure Laterality Date    LAPAROSCOPIC NISSEN FUNDOPLICATION       Family History   Problem Relation Age of Onset    Hypertension  Father       Social History     Socioeconomic History    Marital status:      Spouse name: Chidi    Number of children: 0   Occupational History     Comment: [employer]   Tobacco Use    Smoking status: Never Smoker    Smokeless tobacco: Never Used   Substance and Sexual Activity    Alcohol use: Yes     Comment: very rarely    Drug use: No    Sexual activity: Yes     Partners: Female     Current Outpatient Medications   Medication Sig Dispense Refill    vilazodone (VIIBRYD) 20 mg Tab Take 1 tablet (20 mg total) by mouth once daily. 30 tablet 0     No current facility-administered medications for this visit.     Review of patient's allergies indicates:  No Known Allergies   Past Medical History:   Diagnosis Date    Anxiety     Conjunctivitis     Corneal abrasion 2014    Depression     History of endoscopy 12/14/2018    Nissen fundoplication was formed. Wrap appears to be intact. Mild schatzki ring. Dilated. No gross lesions in the duodenal bulb and second part of the duodenum. Dr. Elmira Zaman    ADRIAN (obstructive sleep apnea) 2010     Past Surgical History:   Procedure Laterality Date    LAPAROSCOPIC NISSEN FUNDOPLICATION         Review of Systems   Constitutional: Negative for appetite change, chills, diaphoresis, irritability, unexpected weight change and weight gain.   HENT: Negative for ear discharge, facial swelling, hearing loss, nosebleeds and trouble swallowing.    Eyes: Negative for photophobia, pain and visual disturbance.   Respiratory: Negative for apnea, choking, shortness of breath and wheezing.    Cardiovascular: Negative for chest pain and palpitations.   Gastrointestinal: Negative for abdominal pain, blood in stool and vomiting.   Endocrine: Negative for polyphagia.   Genitourinary: Negative for difficulty urinating and hematuria.   Musculoskeletal: Negative for gait problem and joint swelling.   Skin: Negative for pallor.   Neurological: Negative for dizziness,  "seizures, speech difficulty, weakness, light-headedness and headaches.   Hematological: Does not bruise/bleed easily.   Psychiatric/Behavioral: Positive for depression. Negative for agitation, confusion, decreased concentration, dysphoric mood, self-injury, sleep disturbance and suicidal ideas. The patient is not nervous/anxious and does not have insomnia.       OBJECTIVE:      Vitals:    02/21/22 0744   BP: 110/80   Pulse: 104   Temp: 98.1 °F (36.7 °C)   SpO2: 95%   Weight: 116.1 kg (256 lb)   Height: 5' 10" (1.778 m)     Physical Exam  Vitals and nursing note reviewed.   Constitutional:       General: He is not in acute distress.     Appearance: He is well-developed.   HENT:      Head: Normocephalic and atraumatic.      Nose: Nose normal.      Mouth/Throat:      Pharynx: Uvula midline.   Eyes:      General: Lids are normal.      Conjunctiva/sclera: Conjunctivae normal.      Pupils: Pupils are equal, round, and reactive to light.      Right eye: Pupil is round and reactive.      Left eye: Pupil is round and reactive.   Neck:      Thyroid: No thyromegaly.      Vascular: No carotid bruit.   Cardiovascular:      Rate and Rhythm: Normal rate and regular rhythm.      Pulses: Normal pulses.      Heart sounds: Normal heart sounds. No murmur heard.  Pulmonary:      Effort: Pulmonary effort is normal.      Breath sounds: Normal breath sounds.   Abdominal:      General: Bowel sounds are normal.      Palpations: Abdomen is soft. Abdomen is not rigid.      Tenderness: There is no abdominal tenderness.   Musculoskeletal:         General: Normal range of motion.      Cervical back: Normal range of motion and neck supple.   Lymphadenopathy:      Cervical: No cervical adenopathy.   Skin:     General: Skin is warm and dry.      Nails: There is no clubbing.   Neurological:      Mental Status: He is alert and oriented to person, place, and time.   Psychiatric:         Mood and Affect: Mood normal.         Speech: Speech normal.       "   Behavior: Behavior normal. Behavior is cooperative.         Thought Content: Thought content normal.         Judgment: Judgment normal.        Assessment:       1. Preventative health care    2. Depression with anxiety    3. Vitamin D deficiency    4. Vitamin B 12 deficiency    5. Need for hepatitis C screening test    6. Elevated glucose        Plan:       Preventative health care  -     CBC Auto Differential; Future; Expected date: 03/07/2022  -     Comprehensive Metabolic Panel; Future; Expected date: 03/07/2022  -     Lipid Panel; Future; Expected date: 03/07/2022  -     TSH; Future; Expected date: 04/04/2022  -     Urinalysis; Future; Expected date: 03/07/2022    Depression with anxiety  -     Vitamin D; Future; Expected date: 03/07/2022  -     Vitamin B12; Future; Expected date: 02/21/2022  -     vilazodone (VIIBRYD) 20 mg Tab; Take 1 tablet (20 mg total) by mouth once daily.  Dispense: 30 tablet; Refill: 0    Vitamin D deficiency  -     Vitamin D; Future; Expected date: 03/07/2022    Vitamin B 12 deficiency  -     Vitamin B12; Future; Expected date: 02/21/2022    Need for hepatitis C screening test  -     Hepatitis C antibody; Future; Expected date: 02/21/2022    Elevated glucose  -     Hemoglobin A1C, POCT                           5.6      Follow up in about 6 months (around 8/21/2022) for anxiety.      2/21/2022 TOBIN Combs, FNP

## 2022-02-21 NOTE — TELEPHONE ENCOUNTER
The following medication needs a prior authorization:     Medication Name: vilazodone    Dosage: 20 mg    Frequency: Once daily    Directions for use: Take 1 tablet (20 mg total) by mouth once daily.     Diagnosis: Depression with anxiety    Is the request for a reauthorization? Yes    Is the patient currently stable on therapy? Yes    Please list all therapeutic alternatives previously used with start/end dates and outcome:

## 2022-02-22 ENCOUNTER — TELEPHONE (OUTPATIENT)
Dept: FAMILY MEDICINE | Facility: CLINIC | Age: 32
End: 2022-02-22
Payer: COMMERCIAL

## 2022-02-22 NOTE — TELEPHONE ENCOUNTER
The following medication needs a prior authorization:     Medication Name: Vilazodone    Dosage: 20 mg    Frequency: daily       Directions for use: Take 1 tablet (20 mg total) by mouth once genny      Diagnosis: depression with anxiety    Is the request for a reauthorization?yes     Is the patient currently stable on therapy? yes    Please list all therapeutic alternatives previously used with start/end dates and outcome:

## 2022-03-15 LAB
25(OH)D3 SERPL-MCNC: 35 NG/ML (ref 30–100)
ALBUMIN SERPL-MCNC: 4.2 G/DL (ref 3.6–5.1)
ALBUMIN/GLOB SERPL: 1.7 (CALC) (ref 1–2.5)
ALP SERPL-CCNC: 50 U/L (ref 36–130)
ALT SERPL-CCNC: 21 U/L (ref 9–46)
APPEARANCE UR: CLEAR
AST SERPL-CCNC: 22 U/L (ref 10–40)
BASOPHILS # BLD AUTO: 59 CELLS/UL (ref 0–200)
BASOPHILS NFR BLD AUTO: 0.9 %
BILIRUB SERPL-MCNC: 0.4 MG/DL (ref 0.2–1.2)
BILIRUB UR QL STRIP: NEGATIVE
BUN SERPL-MCNC: 13 MG/DL (ref 7–25)
BUN/CREAT SERPL: ABNORMAL (CALC) (ref 6–22)
CALCIUM SERPL-MCNC: 9.1 MG/DL (ref 8.6–10.3)
CHLORIDE SERPL-SCNC: 104 MMOL/L (ref 98–110)
CHOLEST SERPL-MCNC: 195 MG/DL
CHOLEST/HDLC SERPL: 3.9 (CALC)
CO2 SERPL-SCNC: 29 MMOL/L (ref 20–32)
COLOR UR: YELLOW
CREAT SERPL-MCNC: 1.06 MG/DL (ref 0.6–1.35)
EOSINOPHIL # BLD AUTO: 98 CELLS/UL (ref 15–500)
EOSINOPHIL NFR BLD AUTO: 1.5 %
ERYTHROCYTE [DISTWIDTH] IN BLOOD BY AUTOMATED COUNT: 12.6 % (ref 11–15)
GLOBULIN SER CALC-MCNC: 2.5 G/DL (CALC) (ref 1.9–3.7)
GLUCOSE SERPL-MCNC: 102 MG/DL (ref 65–99)
GLUCOSE UR QL STRIP: NEGATIVE
HCT VFR BLD AUTO: 43.2 % (ref 38.5–50)
HCV AB S/CO SERPL IA: 0.01
HCV AB SERPL QL IA: NORMAL
HDLC SERPL-MCNC: 50 MG/DL
HGB BLD-MCNC: 14.3 G/DL (ref 13.2–17.1)
HGB UR QL STRIP: NEGATIVE
KETONES UR QL STRIP: NEGATIVE
LDLC SERPL CALC-MCNC: 116 MG/DL (CALC)
LEUKOCYTE ESTERASE UR QL STRIP: NEGATIVE
LYMPHOCYTES # BLD AUTO: 2067 CELLS/UL (ref 850–3900)
LYMPHOCYTES NFR BLD AUTO: 31.8 %
MCH RBC QN AUTO: 29.9 PG (ref 27–33)
MCHC RBC AUTO-ENTMCNC: 33.1 G/DL (ref 32–36)
MCV RBC AUTO: 90.2 FL (ref 80–100)
MONOCYTES # BLD AUTO: 611 CELLS/UL (ref 200–950)
MONOCYTES NFR BLD AUTO: 9.4 %
NEUTROPHILS # BLD AUTO: 3666 CELLS/UL (ref 1500–7800)
NEUTROPHILS NFR BLD AUTO: 56.4 %
NITRITE UR QL STRIP: NEGATIVE
NONHDLC SERPL-MCNC: 145 MG/DL (CALC)
PH UR STRIP: NORMAL [PH] (ref 5–8)
PLATELET # BLD AUTO: 301 THOUSAND/UL (ref 140–400)
PMV BLD REES-ECKER: 10.6 FL (ref 7.5–12.5)
POTASSIUM SERPL-SCNC: 4.1 MMOL/L (ref 3.5–5.3)
PROT SERPL-MCNC: 6.7 G/DL (ref 6.1–8.1)
PROT UR QL STRIP: NEGATIVE
RBC # BLD AUTO: 4.79 MILLION/UL (ref 4.2–5.8)
SODIUM SERPL-SCNC: 141 MMOL/L (ref 135–146)
SP GR UR STRIP: 1.01 (ref 1–1.03)
TRIGL SERPL-MCNC: 173 MG/DL
TSH SERPL-ACNC: 1.59 MIU/L (ref 0.4–4.5)
VIT B12 SERPL-MCNC: 1122 PG/ML (ref 200–1100)
WBC # BLD AUTO: 6.5 THOUSAND/UL (ref 3.8–10.8)

## 2022-03-16 ENCOUNTER — TELEPHONE (OUTPATIENT)
Dept: FAMILY MEDICINE | Facility: CLINIC | Age: 32
End: 2022-03-16
Payer: COMMERCIAL

## 2022-03-16 NOTE — TELEPHONE ENCOUNTER
----- Message from Cristian Abdalla NP sent at 3/16/2022  7:24 AM CDT -----  Labs look good overall. B12 is a little high, he can cut his dose in half

## 2022-06-20 DIAGNOSIS — F41.8 DEPRESSION WITH ANXIETY: ICD-10-CM

## 2022-06-20 RX ORDER — VILAZODONE HYDROCHLORIDE 20 MG/1
1 TABLET ORAL DAILY
Qty: 30 TABLET | Refills: 0 | Status: SHIPPED | OUTPATIENT
Start: 2022-06-20 | End: 2022-07-18

## 2022-07-06 ENCOUNTER — OFFICE VISIT (OUTPATIENT)
Dept: DERMATOLOGY | Facility: CLINIC | Age: 32
End: 2022-07-06
Payer: COMMERCIAL

## 2022-07-06 VITALS — BODY MASS INDEX: 36.65 KG/M2 | WEIGHT: 256 LBS | HEIGHT: 70 IN

## 2022-07-06 DIAGNOSIS — L81.4 LENTIGO: ICD-10-CM

## 2022-07-06 DIAGNOSIS — D22.9 MULTIPLE BENIGN NEVI: Primary | ICD-10-CM

## 2022-07-06 DIAGNOSIS — D23.9 DERMATOFIBROMA: ICD-10-CM

## 2022-07-06 DIAGNOSIS — D18.01 CHERRY ANGIOMA: ICD-10-CM

## 2022-07-06 DIAGNOSIS — L30.9 DERMATITIS: ICD-10-CM

## 2022-07-06 PROCEDURE — 3044F PR MOST RECENT HEMOGLOBIN A1C LEVEL <7.0%: ICD-10-PCS | Mod: CPTII,S$GLB,, | Performed by: STUDENT IN AN ORGANIZED HEALTH CARE EDUCATION/TRAINING PROGRAM

## 2022-07-06 PROCEDURE — 3044F HG A1C LEVEL LT 7.0%: CPT | Mod: CPTII,S$GLB,, | Performed by: STUDENT IN AN ORGANIZED HEALTH CARE EDUCATION/TRAINING PROGRAM

## 2022-07-06 PROCEDURE — 99203 OFFICE O/P NEW LOW 30 MIN: CPT | Mod: S$GLB,,, | Performed by: STUDENT IN AN ORGANIZED HEALTH CARE EDUCATION/TRAINING PROGRAM

## 2022-07-06 PROCEDURE — 99203 PR OFFICE/OUTPT VISIT, NEW, LEVL III, 30-44 MIN: ICD-10-PCS | Mod: S$GLB,,, | Performed by: STUDENT IN AN ORGANIZED HEALTH CARE EDUCATION/TRAINING PROGRAM

## 2022-07-06 PROCEDURE — 1160F RVW MEDS BY RX/DR IN RCRD: CPT | Mod: CPTII,S$GLB,, | Performed by: STUDENT IN AN ORGANIZED HEALTH CARE EDUCATION/TRAINING PROGRAM

## 2022-07-06 PROCEDURE — 99999 PR PBB SHADOW E&M-EST. PATIENT-LVL III: ICD-10-PCS | Mod: PBBFAC,,, | Performed by: STUDENT IN AN ORGANIZED HEALTH CARE EDUCATION/TRAINING PROGRAM

## 2022-07-06 PROCEDURE — 1159F MED LIST DOCD IN RCRD: CPT | Mod: CPTII,S$GLB,, | Performed by: STUDENT IN AN ORGANIZED HEALTH CARE EDUCATION/TRAINING PROGRAM

## 2022-07-06 PROCEDURE — 3008F PR BODY MASS INDEX (BMI) DOCUMENTED: ICD-10-PCS | Mod: CPTII,S$GLB,, | Performed by: STUDENT IN AN ORGANIZED HEALTH CARE EDUCATION/TRAINING PROGRAM

## 2022-07-06 PROCEDURE — 99999 PR PBB SHADOW E&M-EST. PATIENT-LVL III: CPT | Mod: PBBFAC,,, | Performed by: STUDENT IN AN ORGANIZED HEALTH CARE EDUCATION/TRAINING PROGRAM

## 2022-07-06 PROCEDURE — 1159F PR MEDICATION LIST DOCUMENTED IN MEDICAL RECORD: ICD-10-PCS | Mod: CPTII,S$GLB,, | Performed by: STUDENT IN AN ORGANIZED HEALTH CARE EDUCATION/TRAINING PROGRAM

## 2022-07-06 PROCEDURE — 1160F PR REVIEW ALL MEDS BY PRESCRIBER/CLIN PHARMACIST DOCUMENTED: ICD-10-PCS | Mod: CPTII,S$GLB,, | Performed by: STUDENT IN AN ORGANIZED HEALTH CARE EDUCATION/TRAINING PROGRAM

## 2022-07-06 PROCEDURE — 3008F BODY MASS INDEX DOCD: CPT | Mod: CPTII,S$GLB,, | Performed by: STUDENT IN AN ORGANIZED HEALTH CARE EDUCATION/TRAINING PROGRAM

## 2022-07-06 NOTE — PROGRESS NOTES
Subjective:       Patient ID:  Swapnil Dillard is a 31 y.o. male who presents for   Chief Complaint   Patient presents with    Skin Check     UBSE    Hyperpigmentation     Bilateral knees     New Patient    Skin Check - UBSE    C/o hyperpigmentation on bilateral knees    Derm Hx:  Denies phx NMSC/MM  Denies fhx MM    Few moles removed in the past, non concerning.      Current Outpatient Medications:     vilazodone (VIIBRYD) 20 mg Tab, Take 1 tablet (20 mg total) by mouth once daily., Disp: 30 tablet, Rfl: 0        Review of Systems   Constitutional: Negative for fever, chills and fatigue.   Respiratory: Negative for cough and shortness of breath.    Skin: Positive for activity-related sunscreen use and wears hat. Negative for itching and rash.   Hematologic/Lymphatic: Does not bruise/bleed easily.        Objective:    Physical Exam   Constitutional: He appears well-developed and well-nourished. No distress.   Neurological: He is alert and oriented to person, place, and time. He is not disoriented.   Psychiatric: He has a normal mood and affect.   Skin:   Areas Examined (abnormalities noted in diagram):   Scalp / Hair Palpated and Inspected  Head / Face Inspection Performed  Neck Inspection Performed  Chest / Axilla Inspection Performed  Abdomen Inspection Performed  Back Inspection Performed  RUE Inspected  LUE Inspection Performed  Nails and Digits Inspection Performed                   Diagram Legend     Erythematous scaling macule/papule c/w actinic keratosis       Vascular papule c/w angioma      Pigmented verrucoid papule/plaque c/w seborrheic keratosis      Yellow umbilicated papule c/w sebaceous hyperplasia      Irregularly shaped tan macule c/w lentigo     1-2 mm smooth white papules consistent with Milia      Movable subcutaneous cyst with punctum c/w epidermal inclusion cyst      Subcutaneous movable cyst c/w pilar cyst      Firm pink to brown papule c/w dermatofibroma      Pedunculated fleshy  papule(s) c/w skin tag(s)      Evenly pigmented macule c/w junctional nevus     Mildly variegated pigmented, slightly irregular-bordered macule c/w mildly atypical nevus      Flesh colored to evenly pigmented papule c/w intradermal nevus       Pink pearly papule/plaque c/w basal cell carcinoma      Erythematous hyperkeratotic cursted plaque c/w SCC      Surgical scar with no sign of skin cancer recurrence      Open and closed comedones      Inflammatory papules and pustules      Verrucoid papule consistent consistent with wart     Erythematous eczematous patches and plaques     Dystrophic onycholytic nail with subungual debris c/w onychomycosis     Umbilicated papule    Erythematous-base heme-crusted tan verrucoid plaque consistent with inflamed seborrheic keratosis     Erythematous Silvery Scaling Plaque c/w Psoriasis     See annotation      Assessment / Plan:        Multiple benign nevi  Careful dermoscopy evaluation of nevi performed with none identified as needing biopsy today  Monitor for new mole or moles that are becoming bigger, darker, irritated, or developing irregular borders.   - will recheck suspected traumatized nevus at f/u, no concerning features, long standing  Upper body skin examination performed today including at least 9 points as noted in physical examination. No lesions suspicious for malignancy noted.  Patient instructed in importance in daily broad spectrum sun protection of at least spf 30. Mineral sunscreen ingredients preferred (Zinc +/- Titanium) and can be found OTC.   Patient encouraged to wear hat for all outdoor exposure.   Also discussed sun avoidance and use of protective clothing.    Dermatofibroma  This is a benign scar-like lesion secondary to minor trauma. No treatment required.     Cherry angioma  This is a benign vascular lesion. Reassurance given. No treatment required.     Lentigo  This is a benign hyperpigmented sun induced lesion. Daily sun protection will reduce the  number of new lesions. Treatment of these benign lesions are considered cosmetic.    Dermatitis  - bilateral knees  - frictional dermatosis, previously worked as  and was kneeling frequently, no longer doing this but still with darker plaques on his knees  - will resolve over time, however can use amlactin or urea cream        1 year    No follow-ups on file.

## 2022-07-08 ENCOUNTER — TELEPHONE (OUTPATIENT)
Dept: FAMILY MEDICINE | Facility: CLINIC | Age: 32
End: 2022-07-08

## 2022-07-08 DIAGNOSIS — F41.8 DEPRESSION WITH ANXIETY: Primary | ICD-10-CM

## 2022-07-08 NOTE — TELEPHONE ENCOUNTER
Pt called and said he has a long flight coming up in August and would like to know if there is something he can be prescribed to help with his nerves. He said he will come in if needed.

## 2022-07-11 RX ORDER — BUSPIRONE HYDROCHLORIDE 7.5 MG/1
7.5 TABLET ORAL 2 TIMES DAILY PRN
Qty: 28 TABLET | Refills: 0 | Status: SHIPPED | OUTPATIENT
Start: 2022-07-11 | End: 2022-08-22

## 2022-07-12 ENCOUNTER — PATIENT MESSAGE (OUTPATIENT)
Dept: FAMILY MEDICINE | Facility: CLINIC | Age: 32
End: 2022-07-12

## 2022-08-22 ENCOUNTER — OFFICE VISIT (OUTPATIENT)
Dept: FAMILY MEDICINE | Facility: CLINIC | Age: 32
End: 2022-08-22
Payer: COMMERCIAL

## 2022-08-22 VITALS
SYSTOLIC BLOOD PRESSURE: 100 MMHG | BODY MASS INDEX: 36.51 KG/M2 | HEIGHT: 70 IN | WEIGHT: 255 LBS | TEMPERATURE: 98 F | DIASTOLIC BLOOD PRESSURE: 80 MMHG | OXYGEN SATURATION: 97 % | HEART RATE: 94 BPM

## 2022-08-22 DIAGNOSIS — M54.6 ACUTE BILATERAL THORACIC BACK PAIN: ICD-10-CM

## 2022-08-22 DIAGNOSIS — R73.09 ELEVATED GLUCOSE: ICD-10-CM

## 2022-08-22 DIAGNOSIS — Z00.00 PREVENTATIVE HEALTH CARE: ICD-10-CM

## 2022-08-22 DIAGNOSIS — E53.8 VITAMIN B 12 DEFICIENCY: ICD-10-CM

## 2022-08-22 DIAGNOSIS — F41.8 DEPRESSION WITH ANXIETY: Primary | ICD-10-CM

## 2022-08-22 DIAGNOSIS — M72.2 PLANTAR FASCIITIS: ICD-10-CM

## 2022-08-22 DIAGNOSIS — E55.9 VITAMIN D DEFICIENCY: ICD-10-CM

## 2022-08-22 PROCEDURE — 1159F PR MEDICATION LIST DOCUMENTED IN MEDICAL RECORD: ICD-10-PCS | Mod: CPTII,S$GLB,, | Performed by: NURSE PRACTITIONER

## 2022-08-22 PROCEDURE — 99214 PR OFFICE/OUTPT VISIT, EST, LEVL IV, 30-39 MIN: ICD-10-PCS | Mod: S$GLB,,, | Performed by: NURSE PRACTITIONER

## 2022-08-22 PROCEDURE — 3008F BODY MASS INDEX DOCD: CPT | Mod: CPTII,S$GLB,, | Performed by: NURSE PRACTITIONER

## 2022-08-22 PROCEDURE — 3044F PR MOST RECENT HEMOGLOBIN A1C LEVEL <7.0%: ICD-10-PCS | Mod: CPTII,S$GLB,, | Performed by: NURSE PRACTITIONER

## 2022-08-22 PROCEDURE — 1160F RVW MEDS BY RX/DR IN RCRD: CPT | Mod: CPTII,S$GLB,, | Performed by: NURSE PRACTITIONER

## 2022-08-22 PROCEDURE — 3044F HG A1C LEVEL LT 7.0%: CPT | Mod: CPTII,S$GLB,, | Performed by: NURSE PRACTITIONER

## 2022-08-22 PROCEDURE — 3008F PR BODY MASS INDEX (BMI) DOCUMENTED: ICD-10-PCS | Mod: CPTII,S$GLB,, | Performed by: NURSE PRACTITIONER

## 2022-08-22 PROCEDURE — 1160F PR REVIEW ALL MEDS BY PRESCRIBER/CLIN PHARMACIST DOCUMENTED: ICD-10-PCS | Mod: CPTII,S$GLB,, | Performed by: NURSE PRACTITIONER

## 2022-08-22 PROCEDURE — 99214 OFFICE O/P EST MOD 30 MIN: CPT | Mod: S$GLB,,, | Performed by: NURSE PRACTITIONER

## 2022-08-22 PROCEDURE — 1159F MED LIST DOCD IN RCRD: CPT | Mod: CPTII,S$GLB,, | Performed by: NURSE PRACTITIONER

## 2022-08-22 RX ORDER — BACLOFEN 10 MG/1
10 TABLET ORAL 2 TIMES DAILY
Qty: 28 TABLET | Refills: 0 | Status: SHIPPED | OUTPATIENT
Start: 2022-08-22 | End: 2023-02-22

## 2022-08-22 RX ORDER — VILAZODONE HYDROCHLORIDE 20 MG/1
1 TABLET ORAL DAILY
Qty: 90 TABLET | Refills: 1 | Status: SHIPPED | OUTPATIENT
Start: 2022-08-22 | End: 2023-02-23 | Stop reason: SDUPTHER

## 2022-08-22 RX ORDER — MELOXICAM 15 MG/1
15 TABLET ORAL DAILY
Qty: 14 TABLET | Refills: 0 | Status: SHIPPED | OUTPATIENT
Start: 2022-08-22 | End: 2023-02-22

## 2022-08-22 NOTE — PROGRESS NOTES
SUBJECTIVE:      Patient ID: Swapnil Dillard is a 31 y.o. male.    Chief Complaint: Anxiety and Back Pain (Upper back pain)    Mr Dillard is here today to f/u on anxiety/depression. Doing well on current meds. He is taking his vitamin supplements daily. He is complaining of chronic plantar fascitis, previously followed by podiatry and asking for a new referral. Also complaining of intermittent upper back pain     Anxiety  Presents for follow-up visit. Onset was more than 5 years ago. The problem has been gradually worsening. Symptoms include depressed mood and excessive worry. Patient reports no chest pain, confusion, decreased concentration, dizziness, feeling of choking, insomnia, irritability, nervous/anxious behavior, palpitations, panic, shortness of breath or suicidal ideas. Symptoms occur occasionally. The severity of symptoms is mild. The symptoms are aggravated by work stress and social activities. The quality of sleep is good. Nighttime awakenings: occasional.     His past medical history is significant for depression. Past treatments include SSRIs. The treatment provided mild relief. Compliance with medications is %.   Depression  Visit Type: follow-up  Patient presents with the following symptoms: depressed mood and excessive worry.  Patient is not experiencing: choking sensation, confusion, decreased concentration, feelings of hopelessness, feelings of worthlessness, insomnia, irritability, nervousness/anxiety, palpitations, panic, shortness of breath, suicidal ideas, suicidal planning, thoughts of death and weight gain.  Frequency of symptoms: occasionally   Severity: mild   Sleep quality: good  Nighttime awakenings: occasional  Compliance with medications:  %        Back Pain  This is a new problem. The current episode started more than 1 month ago. The problem occurs intermittently. The problem is unchanged. The pain is present in the thoracic spine. The pain does not radiate. The pain  is mild. The symptoms are aggravated by twisting and position. Pertinent negatives include no abdominal pain, bladder incontinence, bowel incontinence, chest pain, headaches, numbness, paresis, paresthesias or weakness. Risk factors include poor posture and lack of exercise. He has tried NSAIDs for the symptoms. The treatment provided mild relief.       Past Surgical History:   Procedure Laterality Date    LAPAROSCOPIC NISSEN FUNDOPLICATION       Family History   Problem Relation Age of Onset    Hypertension Father     Melanoma Neg Hx     Psoriasis Neg Hx     Eczema Neg Hx     Lupus Neg Hx       Social History     Socioeconomic History    Marital status:      Spouse name: Chidi    Number of children: 0   Occupational History     Comment: [employer] gov   Tobacco Use    Smoking status: Never Smoker    Smokeless tobacco: Never Used   Substance and Sexual Activity    Alcohol use: Yes     Comment: very rarely    Drug use: No    Sexual activity: Yes     Partners: Female     Current Outpatient Medications   Medication Sig Dispense Refill    baclofen (LIORESAL) 10 MG tablet Take 1 tablet (10 mg total) by mouth 2 (two) times daily. 28 tablet 0    meloxicam (MOBIC) 15 MG tablet Take 1 tablet (15 mg total) by mouth once daily. 14 tablet 0    VIIBRYD 20 mg Tab Take 1 tablet (20 mg total) by mouth once daily. 90 tablet 1     No current facility-administered medications for this visit.     Review of patient's allergies indicates:  No Known Allergies   Past Medical History:   Diagnosis Date    Anxiety     Conjunctivitis     Corneal abrasion 2014    Depression     History of endoscopy 12/14/2018    Nissen fundoplication was formed. Wrap appears to be intact. Mild schatzki ring. Dilated. No gross lesions in the duodenal bulb and second part of the duodenum. Dr. Elmira Zaman    ADRIAN (obstructive sleep apnea) 2010     Past Surgical History:   Procedure Laterality Date    LAPAROSCOPIC NISSEN  "FUNDOPLICATION         Review of Systems   Constitutional: Negative for activity change, appetite change, chills, diaphoresis, irritability, unexpected weight change and weight gain.   HENT: Negative for ear discharge, facial swelling, hearing loss, nosebleeds, rhinorrhea and trouble swallowing.    Eyes: Negative for photophobia, pain, discharge and visual disturbance.   Respiratory: Negative for apnea, choking, chest tightness, shortness of breath and wheezing.    Cardiovascular: Negative for chest pain and palpitations.   Gastrointestinal: Negative for abdominal pain, blood in stool, bowel incontinence, constipation, diarrhea and vomiting.   Endocrine: Negative for polydipsia, polyphagia and polyuria.   Genitourinary: Negative for bladder incontinence, difficulty urinating, hematuria and urgency.   Musculoskeletal: Positive for back pain. Negative for arthralgias, gait problem, joint swelling and neck pain.   Skin: Negative for pallor.   Neurological: Negative for dizziness, seizures, speech difficulty, weakness, numbness, headaches and paresthesias.   Hematological: Does not bruise/bleed easily.   Psychiatric/Behavioral: Positive for depression. Negative for agitation, confusion, decreased concentration, dysphoric mood, self-injury, sleep disturbance and suicidal ideas. The patient is not nervous/anxious and does not have insomnia.       OBJECTIVE:      Vitals:    08/22/22 0736   BP: 100/80   Pulse: 94   Temp: 98.1 °F (36.7 °C)   SpO2: 97%   Weight: 115.7 kg (255 lb)   Height: 5' 10" (1.778 m)     Physical Exam  Vitals and nursing note reviewed.   Constitutional:       General: He is not in acute distress.     Appearance: He is well-developed.   HENT:      Head: Normocephalic and atraumatic.      Nose: Nose normal.      Mouth/Throat:      Pharynx: Uvula midline.   Eyes:      General: Lids are normal.      Conjunctiva/sclera: Conjunctivae normal.      Pupils: Pupils are equal, round, and reactive to light.      " Right eye: Pupil is round and reactive.      Left eye: Pupil is round and reactive.   Neck:      Thyroid: No thyromegaly.      Vascular: No carotid bruit.   Cardiovascular:      Rate and Rhythm: Normal rate and regular rhythm.      Pulses: Normal pulses.      Heart sounds: Normal heart sounds. No murmur heard.  Pulmonary:      Effort: Pulmonary effort is normal.      Breath sounds: Normal breath sounds. No wheezing, rhonchi or rales.   Abdominal:      General: Bowel sounds are normal.      Palpations: Abdomen is soft. Abdomen is not rigid.      Tenderness: There is no abdominal tenderness.   Musculoskeletal:         General: Normal range of motion.      Cervical back: Normal range of motion and neck supple.      Thoracic back: Spasms and tenderness present.   Lymphadenopathy:      Cervical: No cervical adenopathy.   Skin:     General: Skin is warm and dry.      Nails: There is no clubbing.   Neurological:      Mental Status: He is alert and oriented to person, place, and time.   Psychiatric:         Attention and Perception: Attention normal.         Mood and Affect: Mood normal.         Speech: Speech normal.         Behavior: Behavior normal. Behavior is cooperative.         Thought Content: Thought content normal.         Judgment: Judgment normal.        Assessment:       1. Depression with anxiety    2. Vitamin D deficiency    3. Elevated glucose    4. Vitamin B 12 deficiency    5. Acute bilateral thoracic back pain    6. Plantar fasciitis    7. Preventative health care        Plan:       Depression with anxiety  -     VIIBRYD 20 mg Tab; Take 1 tablet (20 mg total) by mouth once daily.  Dispense: 90 tablet; Refill: 1    Vitamin D deficiency  -     Vitamin D; Future; Expected date: 09/05/2022    Elevated glucose  -     Hemoglobin A1C; Future; Expected date: 09/05/2022    Vitamin B 12 deficiency  -     Vitamin B12; Future; Expected date: 08/22/2022    Acute bilateral thoracic back pain  -   start  meloxicam  (MOBIC) 15 MG tablet; Take 1 tablet (15 mg total) by mouth once daily.  Dispense: 14 tablet; Refill: 0  -   start  baclofen (LIORESAL) 10 MG tablet; Take 1 tablet (10 mg total) by mouth 2 (two) times daily.  Dispense: 28 tablet; Refill: 0  He will call if not improved    Plantar fasciitis  -     Ambulatory referral/consult to Podiatry; Future; Expected date: 08/29/2022    Preventative health care  -     CBC Auto Differential; Future; Expected date: 09/05/2022  -     Comprehensive Metabolic Panel; Future; Expected date: 09/05/2022  -     Lipid Panel; Future; Expected date: 09/05/2022  -     TSH; Future; Expected date: 10/03/2022  -     Urinalysis; Future; Expected date: 09/05/2022        Follow up in about 6 months (around 2/22/2023) for wellness.      8/22/2022 TOBIN Combs, MURILEP

## 2022-08-29 ENCOUNTER — HOSPITAL ENCOUNTER (OUTPATIENT)
Dept: RADIOLOGY | Facility: CLINIC | Age: 32
Discharge: HOME | End: 2022-08-29
Attending: PODIATRIST
Payer: COMMERCIAL

## 2022-08-29 ENCOUNTER — OFFICE VISIT (OUTPATIENT)
Dept: PODIATRY | Facility: CLINIC | Age: 32
End: 2022-08-29
Payer: COMMERCIAL

## 2022-08-29 VITALS — WEIGHT: 255 LBS | HEART RATE: 95 BPM | OXYGEN SATURATION: 96 % | HEIGHT: 70 IN | BODY MASS INDEX: 36.51 KG/M2

## 2022-08-29 DIAGNOSIS — M79.671 PAIN IN BOTH FEET: ICD-10-CM

## 2022-08-29 DIAGNOSIS — M21.42 PES PLANUS OF BOTH FEET: ICD-10-CM

## 2022-08-29 DIAGNOSIS — M79.672 PAIN IN BOTH FEET: ICD-10-CM

## 2022-08-29 DIAGNOSIS — M72.2 PLANTAR FASCIITIS: Primary | ICD-10-CM

## 2022-08-29 DIAGNOSIS — M21.41 PES PLANUS OF BOTH FEET: ICD-10-CM

## 2022-08-29 DIAGNOSIS — M62.461 GASTROCNEMIUS EQUINUS OF RIGHT LOWER EXTREMITY: ICD-10-CM

## 2022-08-29 DIAGNOSIS — M62.462 GASTROCNEMIUS EQUINUS OF LEFT LOWER EXTREMITY: ICD-10-CM

## 2022-08-29 PROCEDURE — 73630 XR FOOT COMPLETE 3 VIEW BILATERAL: ICD-10-PCS | Mod: 50,S$GLB,, | Performed by: RADIOLOGY

## 2022-08-29 PROCEDURE — 1159F MED LIST DOCD IN RCRD: CPT | Mod: CPTII,S$GLB,, | Performed by: PODIATRIST

## 2022-08-29 PROCEDURE — 73630 X-RAY EXAM OF FOOT: CPT | Mod: 50,S$GLB,, | Performed by: RADIOLOGY

## 2022-08-29 PROCEDURE — 3044F PR MOST RECENT HEMOGLOBIN A1C LEVEL <7.0%: ICD-10-PCS | Mod: CPTII,S$GLB,, | Performed by: PODIATRIST

## 2022-08-29 PROCEDURE — 1159F PR MEDICATION LIST DOCUMENTED IN MEDICAL RECORD: ICD-10-PCS | Mod: CPTII,S$GLB,, | Performed by: PODIATRIST

## 2022-08-29 PROCEDURE — 3008F BODY MASS INDEX DOCD: CPT | Mod: CPTII,S$GLB,, | Performed by: PODIATRIST

## 2022-08-29 PROCEDURE — 3044F HG A1C LEVEL LT 7.0%: CPT | Mod: CPTII,S$GLB,, | Performed by: PODIATRIST

## 2022-08-29 PROCEDURE — 1160F RVW MEDS BY RX/DR IN RCRD: CPT | Mod: CPTII,S$GLB,, | Performed by: PODIATRIST

## 2022-08-29 PROCEDURE — 1160F PR REVIEW ALL MEDS BY PRESCRIBER/CLIN PHARMACIST DOCUMENTED: ICD-10-PCS | Mod: CPTII,S$GLB,, | Performed by: PODIATRIST

## 2022-08-29 PROCEDURE — 99203 PR OFFICE/OUTPT VISIT, NEW, LEVL III, 30-44 MIN: ICD-10-PCS | Mod: S$GLB,,, | Performed by: PODIATRIST

## 2022-08-29 PROCEDURE — 99203 OFFICE O/P NEW LOW 30 MIN: CPT | Mod: S$GLB,,, | Performed by: PODIATRIST

## 2022-08-29 PROCEDURE — 3008F PR BODY MASS INDEX (BMI) DOCUMENTED: ICD-10-PCS | Mod: CPTII,S$GLB,, | Performed by: PODIATRIST

## 2022-08-29 NOTE — PROGRESS NOTES
"  [address] [facility] [place] [place] [address]  Phone: [phone]   [phone]    Patient's PCP:Cristian Abdalla NP  Referring Provider: Cristian Abdalla    Subjective:      Chief Complaint:: Foot Pain (Bilateral arch pain, heel pain, lateral aspect)    HPI  Swapnil Dillard is a 31 y.o. male who presents today with a complaint of bilateral foot pain that has gotten worse in the last 5 months. Onset of symptoms unknown and reports no trauma.  Current symptoms include bilateral arch pain always, heel pain majority of the time, and lateral aspect pain rarely.  Aggravating factors are first thing stepping out of bed in the AM, prolong walking/standing. Symptoms have gotten worse. Treatment to date have included tennis shoes, inserts (flat footed, heel lifts, PF insoles), soaking, frozen water bottle roll.       Vitals:    08/29/22 1534   Pulse: 95   SpO2: 96%   Weight: 115.7 kg (255 lb)   Height: 5' 10" (1.778 m)   PainSc:   6      Shoe Size: 12    Past Surgical History:   Procedure Laterality Date    LAPAROSCOPIC NISSEN FUNDOPLICATION       Past Medical History:   Diagnosis Date    Anxiety     Conjunctivitis     Corneal abrasion 2014    Depression     History of endoscopy 12/14/2018    Nissen fundoplication was formed. Wrap appears to be intact. Mild schatzki ring. Dilated. No gross lesions in the duodenal bulb and second part of the duodenum. Dr. Elmira Zaman    ADRIAN (obstructive sleep apnea) 2010     Family History   Problem Relation Age of Onset    Hypertension Father     Melanoma Neg Hx     Psoriasis Neg Hx     Eczema Neg Hx     Lupus Neg Hx         Social History:   Marital Status:   Alcohol History:  reports current alcohol use.  Tobacco History:  reports that he has never smoked. He has never used smokeless tobacco.  Drug History:  reports no history of drug use.    Review of patient's allergies indicates:  No Known Allergies    Current Outpatient Medications   Medication Sig Dispense Refill    " baclofen (LIORESAL) 10 MG tablet Take 1 tablet (10 mg total) by mouth 2 (two) times daily. 28 tablet 0    meloxicam (MOBIC) 15 MG tablet Take 1 tablet (15 mg total) by mouth once daily. 14 tablet 0    VIIBRYD 20 mg Tab Take 1 tablet (20 mg total) by mouth once daily. 90 tablet 1     No current facility-administered medications for this visit.       Review of Systems   Constitutional:  Negative for chills, fatigue, fever and unexpected weight change.   HENT:  Negative for hearing loss and trouble swallowing.    Eyes:  Negative for photophobia and visual disturbance.   Respiratory:  Negative for cough, shortness of breath and wheezing.    Cardiovascular:  Negative for chest pain, palpitations and leg swelling.   Gastrointestinal:  Negative for abdominal pain and nausea.   Genitourinary:  Negative for dysuria and frequency.   Musculoskeletal:  Negative for arthralgias, back pain, gait problem, joint swelling and myalgias.   Skin:  Negative for rash and wound.   Neurological:  Negative for tremors, seizures, weakness, numbness and headaches.   Hematological:  Does not bruise/bleed easily.       Objective:        Physical Exam:   Foot Exam    General  General Appearance: appears stated age and healthy   Orientation: alert and oriented to person, place, and time   Affect: appropriate   Gait: antalgic       Right Foot/Ankle     Inspection and Palpation  Ecchymosis: none  Tenderness: calcaneus tenderness, plantar fascia and midtarsal joint   Swelling: none   Arch: pes cavus  Hammertoes: absent  Claw Toes: absent  Hallux valgus: no  Hallux limitus: no  Skin Exam: skin intact;   Neurovascular  Dorsalis pedis: 2+  Posterior tibial: 2+  Capillary Refill: 2+  Saphenous nerve sensation: normal  Tibial nerve sensation: normal  Superficial peroneal nerve sensation: normal  Deep peroneal nerve sensation: normal  Sural nerve sensation: normal    Muscle Strength  Ankle dorsiflexion: 5  Ankle plantar flexion: 5  Ankle inversion:  5  Ankle eversion: 5  Great toe extension: 5  Great toe flexion: 5    Range of Motion    Normal right ankle ROM  Passive  Ankle dorsiflexion: 0    Active  Ankle dorsiflexion: 0    Tests  Calcaneal squeeze: negative   PT Tinel's sign: negative    Too many toes: negative   Paresthesia: negative    Left Foot/Ankle      Inspection and Palpation  Ecchymosis: none  Tenderness: plantar fascia, calcaneus tenderness and midtarsal joint   Swelling: none   Arch: pes planus  Hammertoes: absent  Claw toes: absent  Hallux valgus: no  Hallux limitus: no  Skin Exam: skin intact;   Neurovascular  Dorsalis pedis: 2+  Posterior tibial: 2+  Capillary refill: 2+  Saphenous nerve sensation: normal  Tibial nerve sensation: normal  Superficial peroneal nerve sensation: normal  Deep peroneal nerve sensation: normal  Sural nerve sensation: normal    Muscle Strength  Ankle dorsiflexion: 5  Ankle plantar flexion: 5  Ankle inversion: 5  Ankle eversion: 5  Great toe extension: 5  Great toe flexion: 5    Range of Motion    Normal left ankle ROM  Passive  Ankle dorsiflexion: 5    Active  Ankle dorsiflexion: 5    Tests  Calcaneal squeeze: negative   PT Tinel's sign: negative  Too many toes: positive (Mild deformity)  Paresthesia: negative    Physical Exam  Cardiovascular:      Pulses:           Dorsalis pedis pulses are 2+ on the right side and 2+ on the left side.        Posterior tibial pulses are 2+ on the right side and 2+ on the left side.   Musculoskeletal:      Right foot: No bunion.      Left foot: No bunion.             Right Ankle/Foot Exam     Range of Motion   The patient has normal right ankle ROM.    Left Ankle/Foot Exam     Range of Motion   The patient has normal left ankle ROM.     Muscle Strength   The patient has normal left ankle strength.      Muscle Strength   Right Lower Extremity   Ankle Dorsiflexion:  5   Plantar flexion:  5/5  Left Lower Extremity   Ankle Dorsiflexion:  5   Plantar flexion:  5/5     Vascular Exam     Right  Pulses  Dorsalis Pedis:      2+  Posterior Tibial:      2+        Left Pulses  Dorsalis Pedis:      2+  Posterior Tibial:      2+         Imaging:   AP, lateral, lateral oblique weight-bearing x-rays right foot:  No acute fractures, no bone tumors, no soft tissue masses seen.  Pes cavus foot structure.  Inferior posterior calcaneal exostosis present.    AP, lateral, lateral oblique weight-bearing x-rays left foot:  No acute fractures, no bone tumors, no soft tissue masses seen.  Mild flatfoot deformity.  Inferior posterior calcaneal exostosis present.  Os trigonum present.         Assessment:       1. Plantar fasciitis    2. Pain in both feet    3. Pes planus of both feet    4. Gastrocnemius equinus of left lower extremity    5. Gastrocnemius equinus of right lower extremity      Plan:   Plantar fasciitis  -     Ambulatory referral/consult to Podiatry  -     ORTHOTIC DEVICE (DME)    Pain in both feet  -     X-Ray Foot Complete Bilateral  -     ORTHOTIC DEVICE (DME)    Pes planus of both feet  -     ORTHOTIC DEVICE (DME)    Gastrocnemius equinus of left lower extremity  -     ORTHOTIC DEVICE (DME)    Gastrocnemius equinus of right lower extremity  -     ORTHOTIC DEVICE (DME)  1. Evaluated patient discussed with him the clinical findings of gastrocnemius equinus bilateral right worse than left.  I discussed pes cavus foot structure on right pes planus on the left and diffuse pain throughout plantar fascial area and heel and intrinsic muscles of the foot as well as posterior calcaneus by Achilles tendon.  I reviewed the x-rays with him describing to him the radiological findings of inferior posterior calcaneal exostosis pes cavus right pes planus left and I discussed with him the stress going through the feet.  I told there is no way I am a bit eradicate this completely but I believe since he is failed to respond over-the-counter products physical therapy icing them recommending he try custom-made orthotics.  I  explained to him there are procedures lengthening the calf muscles try to help with dorsiflexion but would leave that as a last resort.  He is going to get the custom-made orthotics heel with them for a few months if he is doing better he will not return to see me.  If she still having pain he return to see me for further evaluation.  Follow up if symptoms worsen or fail to improve.    Procedures          Counseling:     I provided patient education verbally regarding:   Patient diagnosis, treatment options, as well as alternatives, risks, and benefits.     I discussed the types of ankle equinus and the possible causes.  I discussed stretching, bracing with ankle foot orthotic, shoe modification and possible surgical correction.     Discussed different treatment options for heel pain. I gave written and verbal instructions on heel cord stretching and this was demonstrated for the patient. Patient expressed understanding. Discussed wearing appropriate shoe gear and avoiding flats, slippers, sandals, barefoot, and sockfeet. Recommended arch supports. My recommendation for OTC supports is Spenco polysorb replacement insoles or patient may elect more aggressive treatment with prescription arch supports. We also discussed cortisone injections and NSAID therapy.      .[email or web address]  This note was created using Dragon voice recognition software that occasionally misinterpreted phrases or words.

## 2022-08-29 NOTE — PATIENT INSTRUCTIONS
Understanding Plantar Fasciitis    Plantar fasciitis is a condition that causes foot and heel pain. The plantar fascia is a tough band of tissue that runs across the bottom of the foot from the heel to the toes. This tissue pulls on the heel bone. It supports the arch of the foot as it pushes off the ground. If the tissue becomes irritated or red and swollen (inflamed), it is called plantar fasciitis.  How to say it  PLAN-tuhr fa-see-IY-tis     What causes plantar fasciitis?  Plantar fasciitis most often occurs from overusing the plantar fascia. The tissue may become damaged from activities that put repeated stress on the heel and foot. Or it may wear down over time with age and ankle stiffness. You are more likely to have plantar fasciitis if you:  Do activities that require a lot of running, jumping, or dancing  Have a job that requires being on your feet for long periods  Are overweight or obese  Have certain foot problems, such as a tight Achilles tendon, flat feet, or high arches  Often wear poorly fitting shoes    Symptoms of plantar fasciitis  The condition most often causes pain in the heel and the bottom of the foot. The pain may occur when you take your first steps in the morning. It may get better as you walk throughout the day. But as you continue to put weight on the foot, the pain often returns. Pain may also occur after standing or sitting for long periods.    Treating plantar fasciitis  Treatments for plantar fasciitis include:  Resting the foot. This involves limiting movements that make your foot hurt. You may also need to avoid certain sports and types of work for a time.  Using cold packs. Put an ice pack on the heel and foot to help reduce pain and swelling.  Taking pain medicines. Prescription and over-the-counter pain medicines can help relieve pain and swelling.  Using heel cups or foot inserts (orthotics). These are placed in the shoes to help support the heel or arch and cushion the heel.  You may also be told to buy proper-fitting shoes with good arch support and cushioned soles.  Taping the foot. This supports the arch and limits the movement of the plantar fascia to help relieve symptoms.  Wearing a night splint. This stretches the plantar fascia and leg muscles while you sleep. This may help relieve pain.  Doing exercises and physical therapy. These stretch and strengthen the plantar fascia and the muscles in the leg that support the heel and foot.  Getting shots of medicine into the foot. These may help relieve symptoms for a time.  Having surgery. This may be needed if other treatments fail to relieve symptoms. During surgery, the surgeon may partially cut the plantar fascia to release tension.    Possible complications of plantar fasciitis  Without proper care and treatment, healing may take longer than normal. Also, symptoms may continue or get worse. Over time, the plantar fascia may be damaged. This can make it hard to walk or even stand without pain.    When to call your healthcare provider  Call your healthcare provider right away if you have any of these:  Fever of 100.4°F (38°C) or higher, or as directed  Symptoms that dont get better with treatment, or get worse  New symptoms, such as numbness, tingling, or weakness in the foot     Date Last Reviewed: 3/10/2016  © 5369-4602 [employer]. [address] [place] [address], [place] [address]. All rights reserved. This information is not intended as a substitute for professional medical care. Always follow your healthcare professional's instructions.       Adult Acquired Flatfoot  A variety of foot problems can lead to adult acquired flatfoot deformity (AAFD), a condition that results in a fallen arch with the foot pointed outward.    Most people -- no matter what the cause of their flatfoot -- can be helped with orthotics, braces and physical therapy. In patients who have tried these treatments without any relief, surgery can be a very  "effective way to help with the pain and deformity.    This article provides a brief overview of the problems that can result in AAFD. Further details regarding the most common conditions that cause an acquired flatfoot and their treatment options are provided in separate articles. Links to those articles are provided.    One of the more common signs of flatfoot is the "too many toes" sign. Even the big toe can be seen from the back of this patient's foot. In a normal foot, only the fourth and fifth toes should be visible.    Symptoms  Depending on the cause of the flatfoot, a patient may experience one or more of the different symptoms below:  Pain along the course of the posterior tibial tendon which lies on the inside of the foot and ankle. This can be associated with swelling on the inside of the ankle.  Pain that is worse with activity. High intensity or impact activities, such as running, can be very difficult. Some patients can have difficulty walking or even standing for long periods of time.  When the foot collapses, the heel bone may shift position and put pressure on the outside ankle bone (fibula). This can cause pain on the outside of the ankle. Arthritis in the heel also causes this same type of pain.  Patients with an old injury or arthritis in the middle of the foot can have painful, bony bumps on the top and inside of the foot. These make shoewear very difficult. Occasionally, the bony spurs are so large that they pinch the nerves which can result in numbness and tingling on the top of the foot and into the toes.  Diabetics may only notice swelling or a large bump on the bottom of the foot. Because their sensation is affected, people with diabetes may not have any pain. The large bump can cause skin problems and an ulcer (a sore that does not heal) may develop if proper diabetic shoewear is not used.    Cause  As discussed above, many health conditions can create a painful flatfoot.         "

## 2022-09-10 ENCOUNTER — PATIENT MESSAGE (OUTPATIENT)
Dept: FAMILY MEDICINE | Facility: CLINIC | Age: 32
End: 2022-09-10

## 2022-09-10 DIAGNOSIS — M54.6 ACUTE BILATERAL THORACIC BACK PAIN: Primary | ICD-10-CM

## 2022-10-07 ENCOUNTER — OFFICE VISIT (OUTPATIENT)
Dept: SPINE | Facility: CLINIC | Age: 32
End: 2022-10-07
Payer: COMMERCIAL

## 2022-10-07 ENCOUNTER — HOSPITAL ENCOUNTER (OUTPATIENT)
Dept: RADIOLOGY | Facility: HOSPITAL | Age: 32
Discharge: HOME | End: 2022-10-07
Attending: PHYSICAL MEDICINE & REHABILITATION
Payer: COMMERCIAL

## 2022-10-07 VITALS — HEIGHT: 70 IN | WEIGHT: 255 LBS | BODY MASS INDEX: 36.51 KG/M2

## 2022-10-07 DIAGNOSIS — M54.6 PAIN IN THORACIC SPINE: Primary | ICD-10-CM

## 2022-10-07 DIAGNOSIS — M54.6 PAIN IN THORACIC SPINE: ICD-10-CM

## 2022-10-07 PROCEDURE — 72070 XR THORACIC SPINE AP LATERAL: ICD-10-PCS | Mod: 26,,, | Performed by: RADIOLOGY

## 2022-10-07 PROCEDURE — 99204 PR OFFICE/OUTPT VISIT, NEW, LEVL IV, 45-59 MIN: ICD-10-PCS | Mod: S$GLB,,, | Performed by: PHYSICAL MEDICINE & REHABILITATION

## 2022-10-07 PROCEDURE — 3044F HG A1C LEVEL LT 7.0%: CPT | Mod: CPTII,S$GLB,, | Performed by: PHYSICAL MEDICINE & REHABILITATION

## 2022-10-07 PROCEDURE — 72070 X-RAY EXAM THORAC SPINE 2VWS: CPT | Mod: 26,,, | Performed by: RADIOLOGY

## 2022-10-07 PROCEDURE — 3008F BODY MASS INDEX DOCD: CPT | Mod: CPTII,S$GLB,, | Performed by: PHYSICAL MEDICINE & REHABILITATION

## 2022-10-07 PROCEDURE — 3008F PR BODY MASS INDEX (BMI) DOCUMENTED: ICD-10-PCS | Mod: CPTII,S$GLB,, | Performed by: PHYSICAL MEDICINE & REHABILITATION

## 2022-10-07 PROCEDURE — 1160F PR REVIEW ALL MEDS BY PRESCRIBER/CLIN PHARMACIST DOCUMENTED: ICD-10-PCS | Mod: CPTII,S$GLB,, | Performed by: PHYSICAL MEDICINE & REHABILITATION

## 2022-10-07 PROCEDURE — 1159F PR MEDICATION LIST DOCUMENTED IN MEDICAL RECORD: ICD-10-PCS | Mod: CPTII,S$GLB,, | Performed by: PHYSICAL MEDICINE & REHABILITATION

## 2022-10-07 PROCEDURE — 3044F PR MOST RECENT HEMOGLOBIN A1C LEVEL <7.0%: ICD-10-PCS | Mod: CPTII,S$GLB,, | Performed by: PHYSICAL MEDICINE & REHABILITATION

## 2022-10-07 PROCEDURE — 72070 X-RAY EXAM THORAC SPINE 2VWS: CPT | Mod: TC,FY

## 2022-10-07 PROCEDURE — 1160F RVW MEDS BY RX/DR IN RCRD: CPT | Mod: CPTII,S$GLB,, | Performed by: PHYSICAL MEDICINE & REHABILITATION

## 2022-10-07 PROCEDURE — 1159F MED LIST DOCD IN RCRD: CPT | Mod: CPTII,S$GLB,, | Performed by: PHYSICAL MEDICINE & REHABILITATION

## 2022-10-07 PROCEDURE — 99204 OFFICE O/P NEW MOD 45 MIN: CPT | Mod: S$GLB,,, | Performed by: PHYSICAL MEDICINE & REHABILITATION

## 2022-10-07 RX ORDER — CELECOXIB 200 MG/1
200 CAPSULE ORAL DAILY PRN
Qty: 30 CAPSULE | Refills: 1 | Status: SHIPPED | OUTPATIENT
Start: 2022-10-07 | End: 2023-02-22

## 2022-10-07 RX ORDER — ORPHENADRINE CITRATE 100 MG/1
100 TABLET, EXTENDED RELEASE ORAL 2 TIMES DAILY PRN
Qty: 40 TABLET | Refills: 1 | Status: SHIPPED | OUTPATIENT
Start: 2022-10-07 | End: 2022-10-17

## 2022-10-07 NOTE — PATIENT INSTRUCTIONS
Call [phone] to get physical therapy scheduled.   Contact Dr. Sharp's office via [facility] or at [phone] when you are in week 4 of physical therapy to schedule follow up.

## 2022-10-07 NOTE — PROGRESS NOTES
SUBJECTIVE:    Patient ID: Swapnil Dillard is a 31 y.o. male.    Chief Complaint: Mid-back Pain and Back Pain    This is a 31-year-old man who sees Cristian Abdalla nurse practitioner for his primary care.  Denies any chronic major medical problems.  No cancer history.  Presents with primary complaint of midthoracic discomfort for about 3 months.  Started for no apparent reason.  He saw primary care with that issue on 08/22/2022 and was given a prescription for Mobic and baclofen which did not help.  He has not had any therapy or chiropractic treatment.  Not having any radicular arm or leg pain.  No bowel or bladder dysfunction fever chills sweats or unexpected weight loss.  Symptoms are worse with activity.  He says several years ago he had a similar presentation and received unspecified injections in the midthoracic region which was beneficial.  Pain level currently is 5/10.  I have no imaging to review        Past Medical History:   Diagnosis Date    Anxiety     Conjunctivitis     Corneal abrasion 2014    Depression     History of endoscopy 12/14/2018    Nissen fundoplication was formed. Wrap appears to be intact. Mild schatzki ring. Dilated. No gross lesions in the duodenal bulb and second part of the duodenum. Dr. Elmira Zaman    ADRIAN (obstructive sleep apnea) 2010     Social History     Socioeconomic History    Marital status:      Spouse name: Chidi    Number of children: 0   Occupational History     Comment: [employer]   Tobacco Use    Smoking status: Never    Smokeless tobacco: Never   Substance and Sexual Activity    Alcohol use: Yes     Comment: very rarely    Drug use: No    Sexual activity: Yes     Partners: Female     Past Surgical History:   Procedure Laterality Date    LAPAROSCOPIC NISSEN FUNDOPLICATION       Family History   Problem Relation Age of Onset    Hypertension Father     Melanoma Neg Hx     Psoriasis Neg Hx     Eczema Neg Hx     Lupus Neg Hx      Vitals:    10/07/22 1452  "  Weight: 115.7 kg (255 lb)   Height: 5' 10" (1.778 m)       Review of Systems   Constitutional:  Negative for chills, diaphoresis, fatigue, fever and unexpected weight change.   HENT:  Negative for trouble swallowing.    Eyes:  Negative for visual disturbance.   Respiratory:  Negative for shortness of breath.    Cardiovascular:  Negative for chest pain.   Gastrointestinal:  Negative for abdominal pain, constipation, diarrhea, nausea and vomiting.   Genitourinary:  Negative for difficulty urinating.   Musculoskeletal:  Negative for arthralgias, back pain, gait problem, joint swelling, myalgias, neck pain and neck stiffness.   Neurological:  Negative for dizziness, speech difficulty, weakness, light-headedness, numbness and headaches.        Objective:      Physical Exam  Neurological:      Mental Status: He is alert and oriented to person, place, and time.      Comments: He is awake and in no acute distress  Mild tenderness palpation mid thoracic paraspinous musculature with no palpable masses  Forward flexion and extension of the lumbar spine are normal and painless  He can heel and toe walk normally  Reflexes- +1-+2 reflexes at the following:   C5-Biceps   C6-Brachioradialis   C7-Triceps   L3/4-Patellar   S1-Achilles   Leidy sign negative bilaterally  Strength testing- 5/5 strength in the following muscle groups:  C5-Elbow flexion  C6-Wrist extension  C7-Elbow extension  C8-Finger flexion  T1-Finger abduction  L2-Hip flexion  L3-Knee extension  L4-Ankle dorsiflexion  L5-Great toe extension  S1-Ankle plantar flexion                  Assessment:       1. Pain in thoracic spine             Plan:     He has a nonfocal neurological examination and no historical red flags.  He his symptoms appear to be muscular in origin.  I recommend a course of physical therapy.  I will get some baseline x-rays.  Change the anti-inflammatory medications to Celebrex and the muscle relaxer to nor flex.  Follow-up with me at the " completion of therapy      Pain in thoracic spine  -     X-Ray Thoracic Spine AP Lateral; Future; Expected date: 10/07/2022  -     Ambulatory referral/consult to Physical/Occupational Therapy; Future; Expected date: 10/14/2022    Other orders  -     celecoxib (CELEBREX) 200 MG capsule; Take 1 capsule (200 mg total) by mouth daily as needed for Pain.  Dispense: 30 capsule; Refill: 1  -     orphenadrine (NORFLEX) 100 mg tablet; Take 1 tablet (100 mg total) by mouth 2 (two) times daily as needed for Muscle spasms.  Dispense: 40 tablet; Refill: 1

## 2022-11-06 ENCOUNTER — PATIENT MESSAGE (OUTPATIENT)
Dept: SPINE | Facility: CLINIC | Age: 32
End: 2022-11-06
Payer: COMMERCIAL

## 2022-11-07 NOTE — TELEPHONE ENCOUNTER
I do not have any records about his previous injections.  If we could obtain some outside records it would be beneficial

## 2022-11-08 DIAGNOSIS — M54.6 PAIN IN THORACIC SPINE: Primary | ICD-10-CM

## 2022-11-20 ENCOUNTER — HOSPITAL ENCOUNTER (OUTPATIENT)
Dept: RADIOLOGY | Facility: HOSPITAL | Age: 32
Discharge: HOME | End: 2022-11-20
Attending: PHYSICAL MEDICINE & REHABILITATION
Payer: COMMERCIAL

## 2022-11-20 DIAGNOSIS — M54.6 PAIN IN THORACIC SPINE: ICD-10-CM

## 2022-11-20 PROCEDURE — 72146 MRI THORACIC SPINE WITHOUT CONTRAST: ICD-10-PCS | Mod: 26,,, | Performed by: RADIOLOGY

## 2022-11-20 PROCEDURE — 72146 MRI CHEST SPINE W/O DYE: CPT | Mod: TC

## 2022-11-20 PROCEDURE — 72146 MRI CHEST SPINE W/O DYE: CPT | Mod: 26,,, | Performed by: RADIOLOGY

## 2022-11-22 ENCOUNTER — TELEPHONE (OUTPATIENT)
Dept: PAIN MEDICINE | Facility: CLINIC | Age: 32
End: 2022-11-22
Payer: COMMERCIAL

## 2022-11-22 ENCOUNTER — OFFICE VISIT (OUTPATIENT)
Dept: SPINE | Facility: CLINIC | Age: 32
End: 2022-11-22
Payer: COMMERCIAL

## 2022-11-22 VITALS — WEIGHT: 255 LBS | HEIGHT: 70 IN | BODY MASS INDEX: 36.51 KG/M2

## 2022-11-22 DIAGNOSIS — M54.6 PAIN IN THORACIC SPINE: Primary | ICD-10-CM

## 2022-11-22 DIAGNOSIS — M54.14 THORACIC RADICULOPATHY: Primary | ICD-10-CM

## 2022-11-22 PROCEDURE — 3008F BODY MASS INDEX DOCD: CPT | Mod: CPTII,S$GLB,, | Performed by: PHYSICAL MEDICINE & REHABILITATION

## 2022-11-22 PROCEDURE — 1160F PR REVIEW ALL MEDS BY PRESCRIBER/CLIN PHARMACIST DOCUMENTED: ICD-10-PCS | Mod: CPTII,S$GLB,, | Performed by: PHYSICAL MEDICINE & REHABILITATION

## 2022-11-22 PROCEDURE — 99213 PR OFFICE/OUTPT VISIT, EST, LEVL III, 20-29 MIN: ICD-10-PCS | Mod: S$GLB,,, | Performed by: PHYSICAL MEDICINE & REHABILITATION

## 2022-11-22 PROCEDURE — 99213 OFFICE O/P EST LOW 20 MIN: CPT | Mod: S$GLB,,, | Performed by: PHYSICAL MEDICINE & REHABILITATION

## 2022-11-22 PROCEDURE — 1160F RVW MEDS BY RX/DR IN RCRD: CPT | Mod: CPTII,S$GLB,, | Performed by: PHYSICAL MEDICINE & REHABILITATION

## 2022-11-22 PROCEDURE — 1159F PR MEDICATION LIST DOCUMENTED IN MEDICAL RECORD: ICD-10-PCS | Mod: CPTII,S$GLB,, | Performed by: PHYSICAL MEDICINE & REHABILITATION

## 2022-11-22 PROCEDURE — 3008F PR BODY MASS INDEX (BMI) DOCUMENTED: ICD-10-PCS | Mod: CPTII,S$GLB,, | Performed by: PHYSICAL MEDICINE & REHABILITATION

## 2022-11-22 PROCEDURE — 1159F MED LIST DOCD IN RCRD: CPT | Mod: CPTII,S$GLB,, | Performed by: PHYSICAL MEDICINE & REHABILITATION

## 2022-11-22 PROCEDURE — 3044F PR MOST RECENT HEMOGLOBIN A1C LEVEL <7.0%: ICD-10-PCS | Mod: CPTII,S$GLB,, | Performed by: PHYSICAL MEDICINE & REHABILITATION

## 2022-11-22 PROCEDURE — 3044F HG A1C LEVEL LT 7.0%: CPT | Mod: CPTII,S$GLB,, | Performed by: PHYSICAL MEDICINE & REHABILITATION

## 2022-11-22 NOTE — PROGRESS NOTES
SUBJECTIVE:    Patient ID: Swapnil Dillard is a 32 y.o. male.    Chief Complaint: Follow-up (MRI f/u)    He is here to review his thoracic MRI done 11/20/2022 to evaluate his complaint of midthoracic pain    The MRI is summarized below:    FINDINGS:  Vertebral column:     The thoracic vertebral bodies maintain normal height.  There is no fracture.  Alignment is normal.  There is moderate disc space narrowing at the T6-7 and T7-8 levels.  These 2 discs are mildly desiccated.  The remainder of the discs have normal signal.  There is a hemangioma in the T12 vertebral body.  Otherwise, baseline marrow signal is normal.     Spinal canal, cord, epidural space:     The spinal canal is developmentally normal.  There is no significant spinal stenosis.  Cord signal is normal.  There is no acute cord compression.  The conus terminates at the level of T12 and is normal in contour and signal intensity.  There is no abnormal epidural mass or fluid collection.     Findings by level:     The cervical spine is included on the sagittal  image.  There are disc protrusions at the C3-4 through C6-7 levels.  This is incompletely evaluated and could be further evaluated with dedicated cervical spine MRI.     T1-2: There is left facet joint arthropathy.  There is no spinal canal or significant foraminal stenosis.     T2-3: There is no spinal canal or significant foraminal stenosis.     T3-4: There is no spinal canal or significant foraminal stenosis.     T4-5: There is a minimal disc bulge.  There is no spinal canal or significant foraminal stenosis.     T5-6: There is a minimal disc bulge.  There is no spinal canal or significant foraminal stenosis.     T6-7: There is a minimal disc bulge.  There is no spinal canal or significant foraminal stenosis.     T7-8: There is a shallow broad central disc protrusion which narrows the ventral subarachnoid space.  There is however no spinal stenosis or cord compression.  The foramina are  patent.     T8-9: There is no spinal canal or significant foraminal stenosis.     T9-10: There is no spinal canal or significant foraminal stenosis.     T10-11: There is no spinal canal or significant foraminal stenosis.  There is very mild facet joint arthropathy.     T11-12: There is mild left facet joint arthropathy.  There is no spinal canal or significant foraminal stenosis.     T12-L1: There is no spinal canal or significant foraminal stenosis.     Soft tissues, other: The prevertebral soft tissues are normal.  The aorta is normal in caliber.  There is no hydronephrosis.     Impression:     1. There is no fracture or malalignment.  There is mild degenerative change present at several levels.  There is a shallow broad central disc protrusion at the T7-8 level which narrows the ventral subarachnoid space but there is no spinal stenosis or cord compression.  There is minimal bulging of the annulus and/or mild facet joint arthropathy at several of the levels.  There is, however, no spinal canal or significant foraminal stenosis at any level.  There is no acute cord or suspected nerve root compression in the thoracic spine.  2. There is multilevel degenerative change in the cervical spine seen on the sagittal  image.  These findings are incompletely evaluated and could be further evaluated with dedicated cervical spine MRI.      I also had him do some plain x-rays of the thoracic spine with a marker.  The marker localizes to around the T5 level.  Clinically he is about the same with pain between the shoulder blades.  No new or progressive problems.  Pain level is 5/10        Past Medical History:   Diagnosis Date    Anxiety     Conjunctivitis     Corneal abrasion 2014    Depression     History of endoscopy 12/14/2018    Nissen fundoplication was formed. Wrap appears to be intact. Mild schatzki ring. Dilated. No gross lesions in the duodenal bulb and second part of the duodenum. Dr. Elmira Zaman    ADRIAN  "(obstructive sleep apnea) 2010     Social History     Socioeconomic History    Marital status:      Spouse name: Chidi    Number of children: 0   Occupational History     Comment: [employer]   Tobacco Use    Smoking status: Never    Smokeless tobacco: Never   Substance and Sexual Activity    Alcohol use: Yes     Comment: very rarely    Drug use: No    Sexual activity: Yes     Partners: Female     Past Surgical History:   Procedure Laterality Date    LAPAROSCOPIC NISSEN FUNDOPLICATION       Family History   Problem Relation Age of Onset    Hypertension Father     Melanoma Neg Hx     Psoriasis Neg Hx     Eczema Neg Hx     Lupus Neg Hx      Vitals:    11/22/22 1310   Weight: 115.7 kg (255 lb)   Height: 5' 10" (1.778 m)       Review of Systems   Constitutional:  Negative for chills, diaphoresis, fatigue, fever and unexpected weight change.   HENT:  Negative for trouble swallowing.    Eyes:  Negative for visual disturbance.   Respiratory:  Negative for shortness of breath.    Cardiovascular:  Negative for chest pain.   Gastrointestinal:  Negative for abdominal pain, constipation, diarrhea, nausea and vomiting.   Genitourinary:  Negative for difficulty urinating.   Musculoskeletal:  Negative for arthralgias, back pain, gait problem, joint swelling, myalgias, neck pain and neck stiffness.   Neurological:  Negative for dizziness, speech difficulty, weakness, light-headedness, numbness and headaches.        Objective:      Physical Exam  Neurological:      Mental Status: He is alert and oriented to person, place, and time.           Assessment:       1. Pain in thoracic spine           Plan:     I reassured him he has no worrisome findings on his MRI.  I believe he may be symptomatic from degenerative disc disease at T7-8.  I recommend interlaminar injection at T7-8.  He can follow up with me after the procedure      Pain in thoracic spine          "

## 2022-11-22 NOTE — TELEPHONE ENCOUNTER
----- Message from Cuate Sharp MD sent at 11/22/2022  1:12 PM CST -----  Please schedule for interlaminar injection T7-8

## 2022-12-07 ENCOUNTER — TELEPHONE (OUTPATIENT)
Dept: PAIN MEDICINE | Facility: CLINIC | Age: 32
End: 2022-12-07
Payer: COMMERCIAL

## 2022-12-07 NOTE — TELEPHONE ENCOUNTER
Will a p2p be done on this pt? His appt is on 12/20. Please inform pt if p2p will not be conducted . Thank you Laverne Helms Staff  Good day     The patient Swapnil Dillard, MRN [id number], for procedure date 12/20/2022 for CPT codes 73092 Injection-steroid-epidural-cervical was denied by [employer] [place] [employer] [facility] for the following reason below:     CPT code 54663     The physical exam findings of radicular symptoms   6 consecutive weeks of conservative therapy within the last 12 months - including dates and/or duration.   Any activity/lifestyle modifications made.   Medications (eg, non-steroidal anti-inflammatory drugs [NSAIDs], non-narcotic analgesics, etc.).   Physical therapy including HEP, and duration - please include a formal PT note.     If Dr. Norris, chooses to do a [id number], please call [facility] / [facility] at [phone], please note the case Tracking #[id number].        Please let me know if there anything I can do to help.     Thank you   Laverne Galvan

## 2022-12-08 ENCOUNTER — PATIENT MESSAGE (OUTPATIENT)
Dept: SURGERY | Facility: AMBULARY SURGERY CENTER | Age: 32
End: 2022-12-08
Payer: COMMERCIAL

## 2022-12-13 ENCOUNTER — PATIENT MESSAGE (OUTPATIENT)
Dept: SPINE | Facility: CLINIC | Age: 32
End: 2022-12-13
Payer: COMMERCIAL

## 2022-12-14 ENCOUNTER — PATIENT MESSAGE (OUTPATIENT)
Dept: SPINE | Facility: CLINIC | Age: 32
End: 2022-12-14
Payer: COMMERCIAL

## 2022-12-14 DIAGNOSIS — M54.6 PAIN IN THORACIC SPINE: Primary | ICD-10-CM

## 2022-12-15 NOTE — TELEPHONE ENCOUNTER
We could try some trigger point injections in the office.  Let us fit him in on Tuesday at 3.  Needs to be a 1/2 hour appointment

## 2022-12-20 ENCOUNTER — OFFICE VISIT (OUTPATIENT)
Dept: SPINE | Facility: CLINIC | Age: 32
End: 2022-12-20
Payer: COMMERCIAL

## 2022-12-20 VITALS — BODY MASS INDEX: 36.51 KG/M2 | WEIGHT: 255 LBS | HEIGHT: 70 IN

## 2022-12-20 DIAGNOSIS — M54.6 PAIN IN THORACIC SPINE: Primary | ICD-10-CM

## 2022-12-20 PROCEDURE — 1159F MED LIST DOCD IN RCRD: CPT | Mod: CPTII,S$GLB,, | Performed by: PHYSICAL MEDICINE & REHABILITATION

## 2022-12-20 PROCEDURE — 3044F HG A1C LEVEL LT 7.0%: CPT | Mod: CPTII,S$GLB,, | Performed by: PHYSICAL MEDICINE & REHABILITATION

## 2022-12-20 PROCEDURE — 1160F PR REVIEW ALL MEDS BY PRESCRIBER/CLIN PHARMACIST DOCUMENTED: ICD-10-PCS | Mod: CPTII,S$GLB,, | Performed by: PHYSICAL MEDICINE & REHABILITATION

## 2022-12-20 PROCEDURE — 1160F RVW MEDS BY RX/DR IN RCRD: CPT | Mod: CPTII,S$GLB,, | Performed by: PHYSICAL MEDICINE & REHABILITATION

## 2022-12-20 PROCEDURE — 3044F PR MOST RECENT HEMOGLOBIN A1C LEVEL <7.0%: ICD-10-PCS | Mod: CPTII,S$GLB,, | Performed by: PHYSICAL MEDICINE & REHABILITATION

## 2022-12-20 PROCEDURE — 3008F PR BODY MASS INDEX (BMI) DOCUMENTED: ICD-10-PCS | Mod: CPTII,S$GLB,, | Performed by: PHYSICAL MEDICINE & REHABILITATION

## 2022-12-20 PROCEDURE — 99213 PR OFFICE/OUTPT VISIT, EST, LEVL III, 20-29 MIN: ICD-10-PCS | Mod: S$GLB,,, | Performed by: PHYSICAL MEDICINE & REHABILITATION

## 2022-12-20 PROCEDURE — 99213 OFFICE O/P EST LOW 20 MIN: CPT | Mod: S$GLB,,, | Performed by: PHYSICAL MEDICINE & REHABILITATION

## 2022-12-20 PROCEDURE — 1159F PR MEDICATION LIST DOCUMENTED IN MEDICAL RECORD: ICD-10-PCS | Mod: CPTII,S$GLB,, | Performed by: PHYSICAL MEDICINE & REHABILITATION

## 2022-12-20 PROCEDURE — 3008F BODY MASS INDEX DOCD: CPT | Mod: CPTII,S$GLB,, | Performed by: PHYSICAL MEDICINE & REHABILITATION

## 2022-12-20 RX ORDER — METHYLPREDNISOLONE ACETATE 40 MG/ML
40 INJECTION, SUSPENSION INTRA-ARTICULAR; INTRALESIONAL; INTRAMUSCULAR; SOFT TISSUE
Status: SHIPPED | OUTPATIENT
Start: 2022-12-20

## 2022-12-20 NOTE — PROGRESS NOTES
"  SUBJECTIVE:    Patient ID: Swapnil Dillard is a 32 y.o. male.    Chief Complaint: Low-back Pain (TPIs)    He presents today with ongoing complaints of bilateral midthoracic pain between the shoulder blades.  These are familiar symptoms.  I note that the interlaminar injection I recommended was denied by his insurance carrier so he is here for trigger point injections.  Pain level is 6/10        Past Medical History:   Diagnosis Date    Anxiety     Conjunctivitis     Corneal abrasion 2014    Depression     History of endoscopy 12/14/2018    Nissen fundoplication was formed. Wrap appears to be intact. Mild schatzki ring. Dilated. No gross lesions in the duodenal bulb and second part of the duodenum. Dr. Elmira Zaman    ADRIAN (obstructive sleep apnea) 2010     Social History     Socioeconomic History    Marital status:      Spouse name: Chidi    Number of children: 0   Occupational History     Comment: [employer] go[employer]   Tobacco Use    Smoking status: Never    Smokeless tobacco: Never   Substance and Sexual Activity    Alcohol use: Yes     Comment: very rarely    Drug use: No    Sexual activity: Yes     Partners: Female     Past Surgical History:   Procedure Laterality Date    LAPAROSCOPIC NISSEN FUNDOPLICATION       Family History   Problem Relation Age of Onset    Hypertension Father     Melanoma Neg Hx     Psoriasis Neg Hx     Eczema Neg Hx     Lupus Neg Hx      Vitals:    12/20/22 1453   Weight: 115.7 kg (255 lb)   Height: 5' 10" (1.778 m)       Review of Systems   Constitutional:  Negative for chills, diaphoresis, fatigue, fever and unexpected weight change.   HENT:  Negative for trouble swallowing.    Eyes:  Negative for visual disturbance.   Respiratory:  Negative for shortness of breath.    Cardiovascular:  Negative for chest pain.   Gastrointestinal:  Negative for abdominal pain, constipation, diarrhea, nausea and vomiting.   Genitourinary:  Negative for difficulty urinating.   Musculoskeletal: "  Negative for arthralgias, back pain, gait problem, joint swelling, myalgias, neck pain and neck stiffness.   Neurological:  Negative for dizziness, speech difficulty, weakness, light-headedness, numbness and headaches.        Objective:      Physical Exam  Neurological:      Mental Status: He is alert and oriented to person, place, and time.      Comments: He has tender points in the midthoracic paraspinous musculature bilaterally.  I injected those with 1 cc of Depo and lidocaine after prep with no immediate complications.  Two trigger points were injected           Assessment:       1. Pain in thoracic spine             Plan:     We will check on him in a couple of weeks to see how he is coming along after the trigger points.  I note he is going to start some physical therapy in January.  He can follow up with me in the office at the completion of therapy or as needed      Pain in thoracic spine

## 2023-01-04 ENCOUNTER — TELEPHONE (OUTPATIENT)
Dept: SPINE | Facility: CLINIC | Age: 33
End: 2023-01-04
Payer: COMMERCIAL

## 2023-01-04 NOTE — TELEPHONE ENCOUNTER
----- Message from Cuate Sharp MD sent at 1/4/2023 11:12 AM CST -----  Please check up on him and see how he is doing

## 2023-01-10 ENCOUNTER — CLINICAL SUPPORT (OUTPATIENT)
Dept: REHABILITATION | Facility: HOSPITAL | Age: 33
End: 2023-01-10
Attending: PHYSICAL MEDICINE & REHABILITATION
Payer: COMMERCIAL

## 2023-01-10 DIAGNOSIS — M54.6 ACUTE BILATERAL THORACIC BACK PAIN: ICD-10-CM

## 2023-01-10 DIAGNOSIS — R68.89 IMPAIRED TOLERANCE OF ACTIVITY: ICD-10-CM

## 2023-01-10 DIAGNOSIS — M54.6 PAIN IN THORACIC SPINE: ICD-10-CM

## 2023-01-10 PROCEDURE — 97110 THERAPEUTIC EXERCISES: CPT | Mod: PN,97

## 2023-01-10 PROCEDURE — 97162 PT EVAL MOD COMPLEX 30 MIN: CPT | Mod: PN

## 2023-01-10 NOTE — PLAN OF CARE
[facility]  Physical Therapy Initial Evaluation    Name: Swapnil Dillard  Clinic Number: [id number]    Therapy Diagnosis:  Encounter Diagnoses   Name Primary?    Pain in thoracic spine     Acute bilateral thoracic back pain     Impaired tolerance of activity       Physician: Cuate Sharp MD    Physician Orders: PT Eval and Treat  Medical Diagnosis from Referral: M54.6 (ICD-10-CM) - Pain in thoracic spine  Evaluation Date: 1/10/2023  Authorization Period Expiration: 12/31/23  Plan of Care Expiration: 12/31/2023    Progress Update: 2/10/2023    Visit # / Visits authorized: 1 / 20 1/6 POC    FOTO: Visit #1 - 1/3     PRECAUTIONS: Standard Precautions     MD Follow-up: 2/2023    Time In: 400  Time Out: 500  Total Appointment Time (timed & untimed codes): 60 minutes    SUBJECTIVE     Date of onset: October     History of current condition - Swapnil is a 32 y.o. male whom reports insidious onset of middle to upper back pain. Was getting better after trigger point injection but is hurting again. Progresses throughout the day, sleep difficult Swapnil's current exercise regiment includes: none.  Seeking Physical Therapy for less mid back pain and increase strength for better posture.    HERMILO: Insidious  Falls: none  Physician Instructions (per patient): none  Other concerns: none    Imaging: MRI 1. There is no fracture or malalignment.  There is mild degenerative change present at several levels.  There is a shallow broad central disc protrusion at the T7-8 level which narrows the ventral subarachnoid space but there is no spinal stenosis or cord compression.  There is minimal bulging of the annulus and/or mild facet joint arthropathy at several of the levels.  There is, however, no spinal canal or significant foraminal stenosis at any level.  There is no acute cord or suspected nerve root compression in the thoracic spine.  2. There is multilevel degenerative change in the cervical spine seen on  the sagittal  image.  These findings are incompletely evaluated and could be further evaluated with dedicated cervical spine MRI.    Prior Therapy: N/A  Social History: Pt lives with their family  Living Environment: 1 story  ADLs unable to complete:  none just increased pain and effort  Gym/Home Equipment: none  Occupation: Pt is Sitting at desk all day 8hr/day  Prior Level of Function: Independent with all ADLs  Current Level of Function: 60% of PLOF    Pain:  Current 4 /10, worst 7 /10, best 4 /10   Location: Between scapula   Description: Sharp  Aggravating Factors: Forward flexion, sleep  Easing Factors: heat, ice only temporary    Dominant Extremity: Right    Pts goals: Pt reported goals are less mid back pain, increase strength in order to maintain better posture during work    _______________________________________________________  Medical History:   Past Medical History:   Diagnosis Date    Anxiety     Conjunctivitis     Corneal abrasion 2014    Depression     History of endoscopy 12/14/2018    Nissen fundoplication was formed. Wrap appears to be intact. Mild schatzki ring. Dilated. No gross lesions in the duodenal bulb and second part of the duodenum. Dr. Elmira Zaman    ADRIAN (obstructive sleep apnea) 2010       Surgical History:    has a past surgical history that includes Laparoscopic Nissen fundoplication.    Medications:   has a current medication list which includes the following prescription(s): baclofen, celecoxib, meloxicam, and viibryd, and the following Facility-Administered Medications: methylprednisolone acetate.    Allergies:   Review of patient's allergies indicates:  No Known Allergies     OBJECTIVE     RANGE OF MOTION:    Cervical Right   (spine) Left    Goal   Cervical Flexion (60) wnl  45     Cervical Extension (90) wnl  45     Cervical Side Bending (45) wnl wnl 45     Cervical Rotation (75) wnl wnl 60       Shoulder AROM/PROM Right   Left   Goal   Forward Flexion (180) wnl wnl  180     ER at 90 degrees (90) wnl wnl 90     ER at 0 degrees (45)  wnl wnl 45     Functional ER (C7) wnl wnl C7     Functional IR (T10) wnl wnl T10       STRENGTH:    U/E MMT Right   Goal   Shoulder Flexion 4/5 5/5 B   Shoulder Abduction 4/5 5/5 B   Shoulder IR 4+/5 5/5 B   Shoulder ER 4/5 5/5 B   Elbow Flexion  5/5 5/5 B   Elbow Extension 5/5 5/5 B     U/E MMT Left   Goal   Shoulder Flexion 4/5 5/5 B   Shoulder Abduction 4/5 5/5 B   Shoulder IR 4+/5 5/5 B   Shoulder ER 4/5 5/5 B   Elbow Flexion  5/5 5/5 B   Elbow Extension 5/5 5/5 B     MUSCLE LENGTH:     Muscle Tested  Right   Left    Goal   Upper Trapezius  decreased decreased Normal B    Levator Scapulae  normal normal Normal B   Sternocleidomastoid normal normal Normal B   Scalenes  decreased decreased Normal B    Pectoralis Minor  decreased decreased Normal B   Pectoralis Major decreased decreased Normal B     JOINT MOBILITY:     Joint Motion Tested Right   Left    Goal   GHJ Posterior Glide Normal Normal Normal B   GHJ Inferior Glide Normal Normal Normal B   GHJ Lateral Glide Normal Normal Normal B   GHJ Anterior Glide Normal Normal Normal B         Sensation:  Sensation is intact to light touch    Palpation: Increased tone and tenderness noted with palpation to: middle traps/rhomboids, medial scapulas     Posture:  Pt presents with postural abnormalities which include: forward head, rounded shoulders, and ankle/foot pronation    Gait: N/A    Movement Analysis: N/A    Balance: N/A      FUNCTION:     CMS Impairment/Limitation/Restriction for FOTO Foto Survey    Therapist reviewed FOTO scores for Swapnil Dillard on 1/10/2023.   FOTO documents entered into [facility] - see Media section.    Limitation Score: 48%         TREATMENT     Total Treatment time separate from Evaluation: (20) minutes    Swapnil received therapeutic exercises to develop strength, endurance, ROM, flexibility, and posture for (20) minutes including: x = exercises performed     TherEx 1/10/2023     Bilateral External Rotation RTB     Hor abd Red TheraBand     CC rows     CC Extensions  CC Palloff press       Plan for Next Visit:     UBE   Posterior pelvic tilt   Bridges  Open Book  Thoracic Extension over roll  PhysioBall rollout  Seated PhysioBall TB band rotations  CC Chops       PATIENT EDUCATION AND HOME EXERCISES       Education/Self-Care provided: (included in treatment) minutes   Patient educated on the impairments noted above and the effects of physical therapy intervention to improve overall condition and QOL.   Patient was educated on all the above exercise prior/during/after for proper posture, positioning, and execution for safe performance with home exercise program.  Exercise Prescription:   1/10/2023: EVAL:     Written Home Exercises Provided: yes. Prefers: Printed Copies  Exercises were reviewed and Swapnil was able to demonstrate them prior to the end of the session.  Swapnil demonstrated good understanding of the education provided.     See EMR under Patient Instructions for exercises provided during therapy sessions.    ASSESSMENT     Swapnil is a 32 y.o. male referred to outpatient Physical Therapy with a medical diagnosis of mid back/thoracic pain.  Swapnil presents with clinical signs and symptoms that support this diagnosis with . Decreased shoulder girdle ROM, decreased scapular and shoulder strength, Glenohumeral joint hypomobility, and impaired functional mobility. Poor posture while working exacerbating pain.    The above impairments will be addressed through manual therapy techniques, therapeutic exercises, functional training, and modalities as necessary. Patient was treated and educated on exercises for home program, progression of therapy, and benefits of therapy to achieve full functional mobility.     Pt prognosis is Fair.   Pt will benefit from skilled outpatient Physical Therapy to address the deficits stated above and in the chart below, provide pt/family education, and to maximize  "pt's level of independence.     Plan of care discussed with patient: Yes  Pt's spiritual, cultural and educational needs considered and patient is agreeable to the plan of care and goals as stated below:     Anticipated Barriers for therapy: sedentary lifestyle    Medical Necessity is demonstrated by the following  History  Co-morbidities and personal factors that may impact the plan of care Co-morbidities:   difficulty sleeping  Past Medical History:   Diagnosis Date    Anxiety     Conjunctivitis     Corneal abrasion 2014    Depression     History of endoscopy 12/14/2018    Nissen fundoplication was formed. Wrap appears to be intact. Mild schatzki ring. Dilated. No gross lesions in the duodenal bulb and second part of the duodenum. Dr. Elmira Zaman    ADRIAN (obstructive sleep apnea) 2010       Personal Factors:   no deficits     moderate   Examination  Body Structures and Functions, activity limitations and participation restrictions that may impact the plan of care Body Regions:   neck  back  upper extremities  trunk    Body Systems:    gross symmetry  ROM  strength  gross coordinated movement  balance    Participation Restrictions:   See above in "Current Level of Function"     Activity limitations:   Learning and applying knowledge  no deficits    General Tasks and Commands  no deficits    Communication  no deficits    Mobility  lifting and carrying objects    Self care  no deficits    Domestic Life  shopping  cooking  doing house work (cleaning house, washing dishes, laundry)    Interactions/Relationships  no deficits    Life Areas  no deficits    Community and Social Life  community life  recreation and leisure         moderate   Clinical Presentation evolving clinical presentation with changing clinical characteristics moderate   Decision Making/ Complexity Score: moderate       GOALS:  SHORT TERM GOALS: 4 weeks, () Progress   Recent signs and systems trend is improving in order to progress towards LTG's.  "   Patient will be independent with HEP in order to further progress and return to maximal function.    Pain rating at Worst: 5/10 in order to progress towards increased independence with activity.    Patient will be able to correct postural deviations in sitting and standing, to decrease pain and promote postural awareness for injury prevention.       LONG TERM GOALS: 6 weeks, () Progress   Patient will return to normal ADL, recreational, and work related activities with less pain and limitation.     Patient will improve AROM to stated goals in order to return to maximal functional potential.     Patient will improve Strength to stated goals of appropriate musculature in order to improve functional independence.     Pain Rating at Best: 1/10 to improve Quality of Life.     Patient will meet predicted functional outcome (FOTO) score: 33% to increase self-worth & perceived functional ability.    Patient will have met/partially met personal goal of: increase strength in order to maintain better seated posture        PLAN   Plan of care Certification: 1/10/2023 to 3/15/2023    Outpatient Physical Therapy 2 times weekly for 3 weeks to include any combination of the following interventions: virtual visits, dry needling, modalities, electrical stimulation (IFC, Pre-Mod, Attended with Functional Dry Needling), Manual Therapy, Moist Heat/ Ice, Neuromuscular Re-ed, Patient Education, Self Care, Therapeutic Exercise, Functional Training, and Therapeutic Activites     Thank you for this referral.    Darryn Charles, PT      I CERTIFY THE NEED FOR THESE SERVICES FURNISHED UNDER THIS PLAN OF TREATMENT AND WHILE UNDER MY CARE   Physician's comments:     Physician's Signature: ___________________________________________________

## 2023-01-11 PROBLEM — M54.6 ACUTE BILATERAL THORACIC BACK PAIN: Status: ACTIVE | Noted: 2023-01-11

## 2023-01-11 PROBLEM — R68.89 IMPAIRED TOLERANCE OF ACTIVITY: Status: ACTIVE | Noted: 2023-01-11

## 2023-01-13 ENCOUNTER — PATIENT MESSAGE (OUTPATIENT)
Dept: SPINE | Facility: CLINIC | Age: 33
End: 2023-01-13
Payer: COMMERCIAL

## 2023-01-17 NOTE — PROGRESS NOTES
[facility]   Physical Therapy Treatment Note     Name: Swapnil Dillard  Clinic Number: [id number]    Therapy Diagnosis:   Encounter Diagnoses   Name Primary?    Acute bilateral thoracic back pain Yes    Impaired tolerance of activity      Physician: Cuate Sharp MD    Visit Date: 1/18/2023    Physician: Cuate Sharp MD    Physician Orders: PT Eval and Treat  Medical Diagnosis from Referral: M54.6 (ICD-10-CM) - Pain in thoracic spine  Evaluation Date: 1/10/2023  Authorization Period Expiration: 12/31/23  Plan of Care Expiration: 12/31/2023                          Progress Update: 2/10/2023               Visit # / Visits authorized: 1 / 20 1/6 POC               FOTO: Visit #1 - 1/3      PRECAUTIONS: Standard Precautions      MD Follow-up: 2/2023    PTA Visit #: 1/5     Time In: 300  Time Out: 355  Total Billable Time: 55 minutes    SUBJECTIVE     Pt reports: rough day.   He was compliant with home exercise program.  Response to previous treatment: eval   Functional change: ongoing     Pain: 7/10  Location:  thoracic region       OBJECTIVE     Objective Measures updated at progress report unless specified.     Treatment     Swapnil received the treatments listed below:      therapeutic exercises to develop ROM and posture for 55 minutes including:    UBE 3'/3'  Posterior pelvic tilt 30x   Bridges 30x  Open Book 20x Bilateral   Supine dowel 3# flexion 30x   Supine serratus punches with 3# dowel 30x   Thoracic Extension over roll  PhysioBall rollout 30x   Seated PhysioBall palloff press Green Theraband 20x   CC Chops 7# 20x   Standing T rotation at the wall 20x       Patient Education and Home Exercises     Home Exercises Provided and Patient Education Provided     Education provided:   - home exercises, postural awareness     Written Home Exercises Provided: Patient instructed to cont prior HEP. Exercises were reviewed and Swapnil was able to demonstrate them prior to the end of  the session.  Swapnil demonstrated good  understanding of the education provided. See EMR under Patient Instructions for exercises provided during therapy sessions    ASSESSMENT     Good tolerance to first follow up session well with focus on postural and thoracic range of motion and postural endurance. Challenged appropriately with added exercises. Continue to progress.     Swapnil Is progressing well towards his goals.   Pt prognosis is Good.     Pt will continue to benefit from skilled outpatient physical therapy to address the deficits listed in the problem list box on initial evaluation, provide pt/family education and to maximize pt's level of independence in the home and community environment.     Pt's spiritual, cultural and educational needs considered and pt agreeable to plan of care and goals.     Anticipated barriers to physical therapy: sedentary lifestyle     Goals:   SHORT TERM GOALS: 4 weeks, () Progress   Recent signs and systems trend is improving in order to progress towards LTG's.  Ongoing   Patient will be independent with HEP in order to further progress and return to maximal function.  Ongoing   Pain rating at Worst: 5/10 in order to progress towards increased independence with activity.  Ongoing   Patient will be able to correct postural deviations in sitting and standing, to decrease pain and promote postural awareness for injury prevention.   Ongoing      LONG TERM GOALS: 6 weeks, () Progress   Patient will return to normal ADL, recreational, and work related activities with less pain and limitation.  Ongoing    Patient will improve AROM to stated goals in order to return to maximal functional potential.   Ongoing   Patient will improve Strength to stated goals of appropriate musculature in order to improve functional independence.   Ongoing   Pain Rating at Best: 1/10 to improve Quality of Life.   Ongoing   Patient will meet predicted functional outcome (FOTO) score: 33% to increase self-worth &  perceived functional ability.  Ongoing   Patient will have met/partially met personal goal of: increase strength in order to maintain better seated posture  Ongoing        PLAN     Continue per Plan of Care     Ayana Handley, PTA

## 2023-01-18 ENCOUNTER — CLINICAL SUPPORT (OUTPATIENT)
Dept: REHABILITATION | Facility: HOSPITAL | Age: 33
End: 2023-01-18
Attending: PHYSICAL MEDICINE & REHABILITATION
Payer: COMMERCIAL

## 2023-01-18 DIAGNOSIS — R68.89 IMPAIRED TOLERANCE OF ACTIVITY: ICD-10-CM

## 2023-01-18 DIAGNOSIS — M54.6 ACUTE BILATERAL THORACIC BACK PAIN: Primary | ICD-10-CM

## 2023-01-18 PROCEDURE — 97110 THERAPEUTIC EXERCISES: CPT | Mod: PN,CQ

## 2023-01-25 ENCOUNTER — CLINICAL SUPPORT (OUTPATIENT)
Dept: REHABILITATION | Facility: HOSPITAL | Age: 33
End: 2023-01-25
Attending: PHYSICAL MEDICINE & REHABILITATION
Payer: COMMERCIAL

## 2023-01-25 DIAGNOSIS — M54.6 ACUTE BILATERAL THORACIC BACK PAIN: Primary | ICD-10-CM

## 2023-01-25 DIAGNOSIS — R68.89 IMPAIRED TOLERANCE OF ACTIVITY: ICD-10-CM

## 2023-01-25 PROCEDURE — 97110 THERAPEUTIC EXERCISES: CPT | Mod: PN,CQ,97

## 2023-01-25 NOTE — PROGRESS NOTES
[facility]   Physical Therapy Treatment Note     Name: Swapnil Dillard  Clinic Number: [id number]    Therapy Diagnosis:   Encounter Diagnoses   Name Primary?    Acute bilateral thoracic back pain Yes    Impaired tolerance of activity      Physician: Cuate Sharp MD    Visit Date: 1/25/2023    Physician: Cuate Sharp MD    Physician Orders: PT Eval and Treat  Medical Diagnosis from Referral: M54.6 (ICD-10-CM) - Pain in thoracic spine  Evaluation Date: 1/10/2023  Authorization Period Expiration: 12/31/23  Plan of Care Expiration: 12/31/2023                          Progress Update: 2/10/2023               Visit # / Visits authorized: 2 / 20 2/6 POC               FOTO: Visit #1 - 1/3      PRECAUTIONS: Standard Precautions      MD Follow-up: 2/2023    PTA Visit #: 1/5     Time In: 300  Time Out: 353  Total Billable Time: 53 minutes    SUBJECTIVE     Pt reports: today is a better day.   He was compliant with home exercise program.  Response to previous treatment: minor soreness   Functional change: feel like it is helping     Pain: 5/10  Location:  thoracic region       OBJECTIVE     Objective Measures updated at progress report unless specified.     Treatment     Swapnil received the treatments listed below:      therapeutic exercises to develop ROM and posture for 53 minutes including:    UBE 3'/3'  Posterior pelvic tilt 30x   Bridges 30x  Open Book 20x Bilateral   Supine dowel 3# flexion 30x   Supine serratus punches with 3# dowel 30x   Thoracic Extension over roll 20x   PhysioBall rollout 30x   Horizontal abduction Green Theraband 20x   Cable column:  Chops 7# 20x   Cable column: shoulder rows 10#/extension 7# 30x each   Standing T rotation at the wall 20x NOT PERFORMED   Prone Ts/Is 30x       Patient Education and Home Exercises     Home Exercises Provided and Patient Education Provided     Education provided:   - home exercises, postural awareness     Written Home  Exercises Provided: Patient instructed to cont prior HEP. Exercises were reviewed and Swapnil was able to demonstrate them prior to the end of the session.  Swapnil demonstrated good  understanding of the education provided. See EMR under Patient Instructions for exercises provided during therapy sessions    ASSESSMENT     Swapnil able to tolerate progressions and challenged appropriately, especially with prone exercises. Minor cueing needed for decreasing upper trap compensations and improve postural endurance. Progress as tolerated.     Swapnil Is progressing well towards his goals.   Pt prognosis is Good.     Pt will continue to benefit from skilled outpatient physical therapy to address the deficits listed in the problem list box on initial evaluation, provide pt/family education and to maximize pt's level of independence in the home and community environment.     Pt's spiritual, cultural and educational needs considered and pt agreeable to plan of care and goals.     Anticipated barriers to physical therapy: sedentary lifestyle     Goals:   SHORT TERM GOALS: 4 weeks, () Progress   Recent signs and systems trend is improving in order to progress towards LTG's.  Ongoing   Patient will be independent with HEP in order to further progress and return to maximal function.  Ongoing   Pain rating at Worst: 5/10 in order to progress towards increased independence with activity.  Ongoing   Patient will be able to correct postural deviations in sitting and standing, to decrease pain and promote postural awareness for injury prevention.   Ongoing      LONG TERM GOALS: 6 weeks, () Progress   Patient will return to normal ADL, recreational, and work related activities with less pain and limitation.  Ongoing    Patient will improve AROM to stated goals in order to return to maximal functional potential.   Ongoing   Patient will improve Strength to stated goals of appropriate musculature in order to improve functional independence.    Ongoing   Pain Rating at Best: 1/10 to improve Quality of Life.   Ongoing   Patient will meet predicted functional outcome (FOTO) score: 33% to increase self-worth & perceived functional ability.  Ongoing   Patient will have met/partially met personal goal of: increase strength in order to maintain better seated posture  Ongoing        PLAN     Continue per Plan of Care     Ayana Handley, PTA

## 2023-01-27 ENCOUNTER — CLINICAL SUPPORT (OUTPATIENT)
Dept: REHABILITATION | Facility: HOSPITAL | Age: 33
End: 2023-01-27
Attending: PHYSICAL MEDICINE & REHABILITATION
Payer: COMMERCIAL

## 2023-01-27 DIAGNOSIS — M54.6 ACUTE BILATERAL THORACIC BACK PAIN: Primary | ICD-10-CM

## 2023-01-27 DIAGNOSIS — R68.89 IMPAIRED TOLERANCE OF ACTIVITY: ICD-10-CM

## 2023-01-27 PROCEDURE — 97110 THERAPEUTIC EXERCISES: CPT | Mod: PN,97

## 2023-01-27 PROCEDURE — 97112 NEUROMUSCULAR REEDUCATION: CPT | Mod: PN,97

## 2023-01-27 NOTE — PROGRESS NOTES
[facility]   Physical Therapy Treatment Note     Name: Swapnil Dillard  Clinic Number: [id number]    Therapy Diagnosis:   Encounter Diagnoses   Name Primary?    Acute bilateral thoracic back pain Yes    Impaired tolerance of activity      Physician: Cuate Sharp MD    Visit Date: 1/27/2023    Physician: Cuate Sharp MD    Physician Orders: PT Eval and Treat  Medical Diagnosis from Referral: M54.6 (ICD-10-CM) - Pain in thoracic spine  Evaluation Date: 1/10/2023  Authorization Period Expiration: 12/31/23  Plan of Care Expiration: 12/31/2023                          Progress Update: 2/10/2023               Visit # / Visits authorized: 3 / 20   3/6 POC               FOTO: Visit #1 - 1/3      PRECAUTIONS: Standard Precautions      MD Follow-up: 2/2023    PTA Visit #: 0/5     Time In: 200  Time Out: 253  Total Billable Time: 53 minutes    SUBJECTIVE     Pt reports: Pain is constant but fluctuates in intensity  He was compliant with home exercise program.  Response to previous treatment: minor soreness   Functional change: feel like it is helping     Pain: 5/10  Location:  thoracic region       OBJECTIVE     Objective Measures updated at progress report unless specified.     Treatment     Swapnil received the treatments listed below:      therapeutic exercises/NMRE to develop Range of Motion, strength, balance proprioception and posture for 53 minutes total including:    UBE 3'/3'    Posterior pelvic tilt 30x   Bridges 30x  Open Book 20x Bilateral   Supine dowel 5# flexion 30x   Supine serratus punches with 5# dowel 30x   Prone Ts/Is 30x    Thoracic Extension over roll 20x   PhysioBall rollout 30x   Horizontal abduction Green Theraband 20x   Archers 20 bilateral with Green TheraBand x 20    Cable column:  Chops 7# 20x   Cable column: shoulder rows 10#/extension 7# 30x each   Standing T rotation at the wall 20x NOT PERFORMED          Patient Education and Home Exercises     Home  Exercises Provided and Patient Education Provided     Education provided:   - home exercises, postural awareness     Written Home Exercises Provided: Patient instructed to cont prior HEP. Exercises were reviewed and Swapnil was able to demonstrate them prior to the end of the session.  Swapnil demonstrated good  understanding of the education provided. See EMR under Patient Instructions for exercises provided during therapy sessions    ASSESSMENT     Swapnil able to tolerate progressions and challenged appropriately, especially with prone exercises. He tends to elevate shoulders with some exercises, able to correct when instructed. Progress as tolerated.     Swapnil Is progressing well towards his goals.   Pt prognosis is Good.     Pt will continue to benefit from skilled outpatient physical therapy to address the deficits listed in the problem list box on initial evaluation, provide pt/family education and to maximize pt's level of independence in the home and community environment.     Pt's spiritual, cultural and educational needs considered and pt agreeable to plan of care and goals.     Anticipated barriers to physical therapy: sedentary lifestyle     Goals:   SHORT TERM GOALS: 4 weeks, () Progress   Recent signs and systems trend is improving in order to progress towards LTG's.  Ongoing  1/27/2023     Patient will be independent with HEP in order to further progress and return to maximal function.  Ongoing  1/27/2023     Pain rating at Worst: 5/10 in order to progress towards increased independence with activity.  Ongoing  1/27/2023     Patient will be able to correct postural deviations in sitting and standing, to decrease pain and promote postural awareness for injury prevention.   Ongoing  1/27/2023        LONG TERM GOALS: 6 weeks, () Progress   Patient will return to normal ADL, recreational, and work related activities with less pain and limitation.  Ongoing   1/27/2023     Patient will improve AROM to stated goals  in order to return to maximal functional potential.   Ongoing  1/27/2023     Patient will improve Strength to stated goals of appropriate musculature in order to improve functional independence.   Ongoing  1/27/2023     Pain Rating at Best: 1/10 to improve Quality of Life.   Ongoing  1/27/2023     Patient will meet predicted functional outcome (FOTO) score: 33% to increase self-worth & perceived functional ability.  Ongoing  1/27/2023     Patient will have met/partially met personal goal of: increase strength in order to maintain better seated posture  Ongoing  1/27/2023          PLAN     Continue per Plan of Care     Darryn Charles, PT

## 2023-01-31 ENCOUNTER — CLINICAL SUPPORT (OUTPATIENT)
Dept: REHABILITATION | Facility: HOSPITAL | Age: 33
End: 2023-01-31
Attending: PHYSICAL MEDICINE & REHABILITATION
Payer: COMMERCIAL

## 2023-01-31 DIAGNOSIS — R68.89 IMPAIRED TOLERANCE OF ACTIVITY: ICD-10-CM

## 2023-01-31 DIAGNOSIS — M54.6 ACUTE BILATERAL THORACIC BACK PAIN: Primary | ICD-10-CM

## 2023-01-31 PROCEDURE — 97110 THERAPEUTIC EXERCISES: CPT | Mod: PN,97

## 2023-01-31 PROCEDURE — 97112 NEUROMUSCULAR REEDUCATION: CPT | Mod: PN

## 2023-01-31 NOTE — PROGRESS NOTES
[facility]   Physical Therapy Treatment Note     Name: Swapnil Dillard  Clinic Number: [id number]    Therapy Diagnosis:   Encounter Diagnoses   Name Primary?    Acute bilateral thoracic back pain Yes    Impaired tolerance of activity      Physician: Cuate Sharp MD    Visit Date: 1/31/2023    Physician: Cuate Sharp MD    Physician Orders: PT Eval and Treat  Medical Diagnosis from Referral: M54.6 (ICD-10-CM) - Pain in thoracic spine  Evaluation Date: 1/10/2023  Authorization Period Expiration: 12/31/23  Plan of Care Expiration: 12/31/2023                          Progress Update: 2/10/2023               Visit # / Visits authorized: 3 / 20   3/6 POC               FOTO: Visit #1 - 1/3      PRECAUTIONS: Standard Precautions      MD Follow-up: 2/2023    PTA Visit #: 0/5     Time In: 200  Time Out: 253  Total Billable Time: 53 minutes    SUBJECTIVE     Pt reports: He is doing ok, still having mid to upper back discomfort  He was compliant with home exercise program.  Response to previous treatment: minor soreness   Functional change: improving posture    Pain: 5/10  Location:  thoracic region       OBJECTIVE     Objective Measures updated at progress report unless specified.     Treatment     Swapnil received the treatments listed below:      therapeutic exercises/NMRE to develop Range of Motion, strength, balance proprioception and posture for 53 minutes total including:    UBE 3'/3'    Posterior pelvic tilt 30x   Bridges 30x  Open Book 20x Bilateral   Supine dowel 5# flexion 30x   Supine serratus punches with 5# dowel 30x   Prone Ts/Is 30x    Thoracic Extension over roll 20x   PhysioBall rollout 30x   Bilateral External Rotation Green TheraBand x 30  Horizontal abduction Green Theraband 20x   Archers 20 bilateral with Green TheraBand x 20    Cable column:  Chops 7# 20x   Cable column: shoulder rows 10#/extension 7# 30x each   Standing T rotation at the wall 20x NOT PERFORMED           Patient Education and Home Exercises     Home Exercises Provided and Patient Education Provided     Education provided:   - home exercises, postural awareness     Written Home Exercises Provided: Patient instructed to cont prior HEP. Exercises were reviewed and Swapnil was able to demonstrate them prior to the end of the session.  Swapnil demonstrated good  understanding of the education provided. See EMR under Patient Instructions for exercises provided during therapy sessions    ASSESSMENT     Swapnil tolerates all, gives good effort. Challenged with scapula strength and stability. Cueing not to elevate shoulders with some exercises Progress as tolerated.     Swapnil Is progressing well towards his goals.   Pt prognosis is Good.     Pt will continue to benefit from skilled outpatient physical therapy to address the deficits listed in the problem list box on initial evaluation, provide pt/family education and to maximize pt's level of independence in the home and community environment.     Pt's spiritual, cultural and educational needs considered and pt agreeable to plan of care and goals.     Anticipated barriers to physical therapy: sedentary lifestyle     Goals:   SHORT TERM GOALS: 4 weeks, () Progress   Recent signs and systems trend is improving in order to progress towards LTG's. MET     Patient will be independent with HEP in order to further progress and return to maximal function. MET     Pain rating at Worst: 5/10 in order to progress towards increased independence with activity. MET     Patient will be able to correct postural deviations in sitting and standing, to decrease pain and promote postural awareness for injury prevention.   Ongoing  1/31/2023        LONG TERM GOALS: 6 weeks, () Progress   Patient will return to normal ADL, recreational, and work related activities with less pain and limitation.  Ongoing   1/31/2023     Patient will improve AROM to stated goals in order to return to maximal functional  potential.   Ongoing  1/31/2023     Patient will improve Strength to stated goals of appropriate musculature in order to improve functional independence.   Ongoing  1/31/2023     Pain Rating at Best: 1/10 to improve Quality of Life.   Ongoing  1/31/2023     Patient will meet predicted functional outcome (FOTO) score: 33% to increase self-worth & perceived functional ability.  Ongoing  1/31/2023     Patient will have met/partially met personal goal of: increase strength in order to maintain better seated posture  Ongoing  1/31/2023          PLAN     Continue per Plan of Care     Darryn Charles, PT

## 2023-02-02 ENCOUNTER — CLINICAL SUPPORT (OUTPATIENT)
Dept: REHABILITATION | Facility: HOSPITAL | Age: 33
End: 2023-02-02
Attending: PHYSICAL MEDICINE & REHABILITATION
Payer: COMMERCIAL

## 2023-02-02 DIAGNOSIS — R68.89 IMPAIRED TOLERANCE OF ACTIVITY: ICD-10-CM

## 2023-02-02 DIAGNOSIS — M54.6 ACUTE BILATERAL THORACIC BACK PAIN: Primary | ICD-10-CM

## 2023-02-02 PROCEDURE — 97110 THERAPEUTIC EXERCISES: CPT | Mod: PN

## 2023-02-02 PROCEDURE — 97112 NEUROMUSCULAR REEDUCATION: CPT | Mod: PN

## 2023-02-02 NOTE — PROGRESS NOTES
[facility]   Physical Therapy Treatment Note     Name: Swapnil Dillard  Clinic Number: [id number]    Therapy Diagnosis:   Encounter Diagnoses   Name Primary?    Acute bilateral thoracic back pain Yes    Impaired tolerance of activity      Physician: Cuate Sharp MD    Visit Date: 2/2/2023    Physician: Cuate Sharp MD    Physician Orders: PT Eval and Treat  Medical Diagnosis from Referral: M54.6 (ICD-10-CM) - Pain in thoracic spine  Evaluation Date: 1/10/2023  Authorization Period Expiration: 12/31/23  Plan of Care Expiration: 12/31/2023                          Progress Update: 2/10/2023               Visit # / Visits authorized: 5 / 20 5/6 POC               FOTO: Visit #1 - 1/3      PRECAUTIONS: Standard Precautions      MD Follow-up: 2/2023    PTA Visit #: 0/5     Time In: 400  Time Out: 453  Total Billable Time: 53 minutes    SUBJECTIVE     Pt reports: He is doing ok, still having mid to upper back discomfort  He was compliant with home exercise program.  Response to previous treatment: minor soreness   Functional change: improving posture    Pain: 5/10  Location:  thoracic region       OBJECTIVE     Objective Measures updated at progress report unless specified.     Treatment     Swapnil received the treatments listed below:      therapeutic exercises/NMRE to develop Range of Motion, strength, balance proprioception and posture for 53 minutes total including:    UBE 3'/3'     Posterior pelvic tilt 30x   Bridges 30x  Open Book 20x Bilateral   Supine dowel 5# flexion 30x   Supine serratus punches with 5# dowel 30x   Prone Ts/Is 30x    Thoracic Extension over roll 20x   PhysioBall rollout 30x   Bilateral External Rotation Green TheraBand x 30  Horizontal abduction Green Theraband 20x   Archers 20 bilateral with Green TheraBand x 20    +SA Roll Up wall x 20  Cable column:  Chops 7# 20x   Cable Column Palloff Press 10# x 20 each direction  Cable column: shoulder rows  10#/extension 7# 30x each   Standing T rotation at the wall 20x NOT PERFORMED          Patient Education and Home Exercises     Home Exercises Provided and Patient Education Provided     Education provided:   - home exercises, postural awareness     Written Home Exercises Provided: Patient instructed to cont prior HEP. Exercises were reviewed and Swapnil was able to demonstrate them prior to the end of the session.  Swapnil demonstrated good  understanding of the education provided. See EMR under Patient Instructions for exercises provided during therapy sessions    ASSESSMENT     Swapnil tolerates all, gives good effort. Challenged with scapula strength and stability. Cueing not to elevate shoulders with some exercises. Feeling better overall, does still have discomfort and may still need another type of intervention once finished with Therapy Progress as tolerated.     Swapnil Is progressing well towards his goals.   Pt prognosis is Good.     Pt will continue to benefit from skilled outpatient physical therapy to address the deficits listed in the problem list box on initial evaluation, provide pt/family education and to maximize pt's level of independence in the home and community environment.     Pt's spiritual, cultural and educational needs considered and pt agreeable to plan of care and goals.     Anticipated barriers to physical therapy: sedentary lifestyle     Goals:   SHORT TERM GOALS: 4 weeks, () Progress   Recent signs and systems trend is improving in order to progress towards LTG's. MET     Patient will be independent with HEP in order to further progress and return to maximal function. MET     Pain rating at Worst: 5/10 in order to progress towards increased independence with activity. MET     Patient will be able to correct postural deviations in sitting and standing, to decrease pain and promote postural awareness for injury prevention.  MET        LONG TERM GOALS: 6 weeks, () Progress   Patient will return to  normal ADL, recreational, and work related activities with less pain and limitation.  Ongoing   2/2/2023     Patient will improve AROM to stated goals in order to return to maximal functional potential.   Ongoing  2/2/2023     Patient will improve Strength to stated goals of appropriate musculature in order to improve functional independence.   Ongoing  2/2/2023     Pain Rating at Best: 1/10 to improve Quality of Life.   Ongoing  2/2/2023     Patient will meet predicted functional outcome (FOTO) score: 33% to increase self-worth & perceived functional ability.  Ongoing  2/2/2023     Patient will have met/partially met personal goal of: increase strength in order to maintain better seated posture  Ongoing  2/2/2023          PLAN     Continue per Plan of Care     Darryn Charles, PT

## 2023-02-06 ENCOUNTER — CLINICAL SUPPORT (OUTPATIENT)
Dept: REHABILITATION | Facility: HOSPITAL | Age: 33
End: 2023-02-06
Attending: PHYSICAL MEDICINE & REHABILITATION
Payer: COMMERCIAL

## 2023-02-06 DIAGNOSIS — R68.89 IMPAIRED TOLERANCE OF ACTIVITY: ICD-10-CM

## 2023-02-06 DIAGNOSIS — M54.6 ACUTE BILATERAL THORACIC BACK PAIN: Primary | ICD-10-CM

## 2023-02-06 PROCEDURE — 97110 THERAPEUTIC EXERCISES: CPT | Mod: PN

## 2023-02-06 PROCEDURE — 97112 NEUROMUSCULAR REEDUCATION: CPT | Mod: PN,97

## 2023-02-06 NOTE — PROGRESS NOTES
[facility]   Physical Therapy Treatment Note     Name: Swapnil Dillard  Clinic Number: [id number]    Therapy Diagnosis:   Encounter Diagnoses   Name Primary?    Acute bilateral thoracic back pain Yes    Impaired tolerance of activity      Physician: Cuate Sharp MD    Visit Date: 2/6/2023    Physician: Cuate Sharp MD    Physician Orders: PT Eval and Treat  Medical Diagnosis from Referral: M54.6 (ICD-10-CM) - Pain in thoracic spine  Evaluation Date: 1/10/2023  Authorization Period Expiration: 12/31/23  Plan of Care Expiration: 12/31/2023                          Progress Update: 2/10/2023               Visit # / Visits authorized: 5 / 20 5/6 POC               FOTO: Visit #1 - 1/3      PRECAUTIONS: Standard Precautions      MD Follow-up: 2/2023    PTA Visit #: 0/5     Time In: 300  Time Out: 353  Total Billable Time: 53 minutes    SUBJECTIVE     Pt reports: He is doing ok, still some discomfort, but is a little better since starting therapy  He was compliant with home exercise program.  Response to previous treatment: minor soreness   Functional change: improving posture    Pain: 5/10  Location:  thoracic region       OBJECTIVE     Objective Measures updated at progress report unless specified.     Treatment     Swapnil received the treatments listed below:      therapeutic exercises/NMRE to develop Range of Motion, strength, balance proprioception and posture for 53 minutes total including:    UBE 3'/3'     Posterior pelvic tilt 30x   Bridges 30x  Open Book 20x Bilateral   Supine dowel 5# flexion 30x   Supine serratus punches with 5# dowel 30x   Prone Ts/Is 30x    Thoracic Extension over roll 20x   PhysioBall rollout 30x   Bilateral External Rotation Green TheraBand x 30x  Horizontal abduction Green Theraband 20x   Archers 20 bilateral with Green TheraBand x 20x    SA Roll Up wall x 20  Cable column:  Chops 7# 20x   Cable Column Palloff Press 10# x 20x each direction  Cable  column: shoulder rows 10#/extension 7# 30x each   Standing T rotation at the wall 20x NOT PERFORMED          Patient Education and Home Exercises     Home Exercises Provided and Patient Education Provided     Education provided:   - home exercises, postural awareness     Written Home Exercises Provided: Patient instructed to cont prior HEP. Exercises were reviewed and Swapnil was able to demonstrate them prior to the end of the session.  Swapnil demonstrated good  understanding of the education provided. See EMR under Patient Instructions for exercises provided during therapy sessions    ASSESSMENT     Swapnil tolerates all, gives good effort. Challenged with scapula strength and stability. Cueing not to elevate shoulders with some exercises. Feeling better overall, not normal/pain free. He will have a follow up with MD and schedule for injection. .     Swapnil Is progressing well towards his goals.   Pt prognosis is Good.     Pt will continue to benefit from skilled outpatient physical therapy to address the deficits listed in the problem list box on initial evaluation, provide pt/family education and to maximize pt's level of independence in the home and community environment.     Pt's spiritual, cultural and educational needs considered and pt agreeable to plan of care and goals.     Anticipated barriers to physical therapy: sedentary lifestyle     Goals:   SHORT TERM GOALS: 4 weeks, () Progress   Recent signs and systems trend is improving in order to progress towards LTG's. MET     Patient will be independent with HEP in order to further progress and return to maximal function. MET     Pain rating at Worst: 5/10 in order to progress towards increased independence with activity. MET     Patient will be able to correct postural deviations in sitting and standing, to decrease pain and promote postural awareness for injury prevention.  MET        LONG TERM GOALS: 6 weeks, () Progress   Patient will return to normal ADL,  recreational, and work related activities with less pain and limitation.  MET     Patient will improve AROM to stated goals in order to return to maximal functional potential.  MET     Patient will improve Strength to stated goals of appropriate musculature in order to improve functional independence.  MET     Pain Rating at Best: 1/10 to improve Quality of Life.   Ongoing  2/6/2023     Patient will meet predicted functional outcome (FOTO) score: 33% to increase self-worth & perceived functional ability.  MET     Patient will have met/partially met personal goal of: increase strength in order to maintain better seated posture MET          PLAN     Continue per Plan of Care     Darryn Charles, PT

## 2023-02-12 LAB
25(OH)D3 SERPL-MCNC: 43 NG/ML (ref 30–100)
ALBUMIN SERPL-MCNC: 4 G/DL (ref 3.6–5.1)
ALBUMIN/GLOB SERPL: 1.5 (CALC) (ref 1–2.5)
ALP SERPL-CCNC: 51 U/L (ref 36–130)
ALT SERPL-CCNC: 19 U/L (ref 9–46)
APPEARANCE UR: CLEAR
AST SERPL-CCNC: 17 U/L (ref 10–40)
BASOPHILS # BLD AUTO: 50 CELLS/UL (ref 0–200)
BASOPHILS NFR BLD AUTO: 0.9 %
BILIRUB SERPL-MCNC: 0.4 MG/DL (ref 0.2–1.2)
BILIRUB UR QL STRIP: NEGATIVE
BUN SERPL-MCNC: 9 MG/DL (ref 7–25)
BUN/CREAT SERPL: NORMAL (CALC) (ref 6–22)
CALCIUM SERPL-MCNC: 9.2 MG/DL (ref 8.6–10.3)
CHLORIDE SERPL-SCNC: 104 MMOL/L (ref 98–110)
CHOLEST SERPL-MCNC: 216 MG/DL
CHOLEST/HDLC SERPL: 4.8 (CALC)
CO2 SERPL-SCNC: 30 MMOL/L (ref 20–32)
COLOR UR: YELLOW
CREAT SERPL-MCNC: 1.07 MG/DL (ref 0.6–1.26)
EGFR: 93 ML/MIN/1.73M2
EOSINOPHIL # BLD AUTO: 78 CELLS/UL (ref 15–500)
EOSINOPHIL NFR BLD AUTO: 1.4 %
ERYTHROCYTE [DISTWIDTH] IN BLOOD BY AUTOMATED COUNT: 11.9 % (ref 11–15)
GLOBULIN SER CALC-MCNC: 2.6 G/DL (CALC) (ref 1.9–3.7)
GLUCOSE SERPL-MCNC: 98 MG/DL (ref 65–99)
GLUCOSE UR QL STRIP: NEGATIVE
HBA1C MFR BLD: 5.5 % OF TOTAL HGB
HCT VFR BLD AUTO: 42.9 % (ref 38.5–50)
HDLC SERPL-MCNC: 45 MG/DL
HGB BLD-MCNC: 14.7 G/DL (ref 13.2–17.1)
HGB UR QL STRIP: NEGATIVE
KETONES UR QL STRIP: NEGATIVE
LDLC SERPL CALC-MCNC: 133 MG/DL (CALC)
LEUKOCYTE ESTERASE UR QL STRIP: NEGATIVE
LYMPHOCYTES # BLD AUTO: 1882 CELLS/UL (ref 850–3900)
LYMPHOCYTES NFR BLD AUTO: 33.6 %
MCH RBC QN AUTO: 30.6 PG (ref 27–33)
MCHC RBC AUTO-ENTMCNC: 34.3 G/DL (ref 32–36)
MCV RBC AUTO: 89.2 FL (ref 80–100)
MONOCYTES # BLD AUTO: 599 CELLS/UL (ref 200–950)
MONOCYTES NFR BLD AUTO: 10.7 %
NEUTROPHILS # BLD AUTO: 2990 CELLS/UL (ref 1500–7800)
NEUTROPHILS NFR BLD AUTO: 53.4 %
NITRITE UR QL STRIP: NEGATIVE
NONHDLC SERPL-MCNC: 171 MG/DL (CALC)
PH UR STRIP: 7 [PH] (ref 5–8)
PLATELET # BLD AUTO: 284 THOUSAND/UL (ref 140–400)
PMV BLD REES-ECKER: 10.6 FL (ref 7.5–12.5)
POTASSIUM SERPL-SCNC: 4.1 MMOL/L (ref 3.5–5.3)
PROT SERPL-MCNC: 6.6 G/DL (ref 6.1–8.1)
PROT UR QL STRIP: NEGATIVE
RBC # BLD AUTO: 4.81 MILLION/UL (ref 4.2–5.8)
SODIUM SERPL-SCNC: 141 MMOL/L (ref 135–146)
SP GR UR STRIP: 1.01 (ref 1–1.03)
TRIGL SERPL-MCNC: 235 MG/DL
TSH SERPL-ACNC: 1.2 MIU/L (ref 0.4–4.5)
VIT B12 SERPL-MCNC: 1150 PG/ML (ref 200–1100)
WBC # BLD AUTO: 5.6 THOUSAND/UL (ref 3.8–10.8)

## 2023-02-13 ENCOUNTER — PATIENT MESSAGE (OUTPATIENT)
Dept: FAMILY MEDICINE | Facility: CLINIC | Age: 33
End: 2023-02-13

## 2023-02-14 ENCOUNTER — PATIENT MESSAGE (OUTPATIENT)
Dept: SPINE | Facility: CLINIC | Age: 33
End: 2023-02-14
Payer: COMMERCIAL

## 2023-02-22 ENCOUNTER — TELEPHONE (OUTPATIENT)
Dept: PAIN MEDICINE | Facility: CLINIC | Age: 33
End: 2023-02-22
Payer: COMMERCIAL

## 2023-02-22 ENCOUNTER — OFFICE VISIT (OUTPATIENT)
Dept: SPINE | Facility: CLINIC | Age: 33
End: 2023-02-22
Payer: COMMERCIAL

## 2023-02-22 VITALS — HEIGHT: 70 IN | BODY MASS INDEX: 36.51 KG/M2 | WEIGHT: 255.06 LBS

## 2023-02-22 DIAGNOSIS — M54.14 THORACIC RADICULOPATHY: Primary | ICD-10-CM

## 2023-02-22 DIAGNOSIS — M54.6 PAIN IN THORACIC SPINE: Primary | ICD-10-CM

## 2023-02-22 PROCEDURE — 1159F MED LIST DOCD IN RCRD: CPT | Mod: CPTII,S$GLB,, | Performed by: PHYSICAL MEDICINE & REHABILITATION

## 2023-02-22 PROCEDURE — 3008F BODY MASS INDEX DOCD: CPT | Mod: CPTII,S$GLB,, | Performed by: PHYSICAL MEDICINE & REHABILITATION

## 2023-02-22 PROCEDURE — 1160F PR REVIEW ALL MEDS BY PRESCRIBER/CLIN PHARMACIST DOCUMENTED: ICD-10-PCS | Mod: CPTII,S$GLB,, | Performed by: PHYSICAL MEDICINE & REHABILITATION

## 2023-02-22 PROCEDURE — 1159F PR MEDICATION LIST DOCUMENTED IN MEDICAL RECORD: ICD-10-PCS | Mod: CPTII,S$GLB,, | Performed by: PHYSICAL MEDICINE & REHABILITATION

## 2023-02-22 PROCEDURE — 3044F HG A1C LEVEL LT 7.0%: CPT | Mod: CPTII,S$GLB,, | Performed by: PHYSICAL MEDICINE & REHABILITATION

## 2023-02-22 PROCEDURE — 1160F RVW MEDS BY RX/DR IN RCRD: CPT | Mod: CPTII,S$GLB,, | Performed by: PHYSICAL MEDICINE & REHABILITATION

## 2023-02-22 PROCEDURE — 3008F PR BODY MASS INDEX (BMI) DOCUMENTED: ICD-10-PCS | Mod: CPTII,S$GLB,, | Performed by: PHYSICAL MEDICINE & REHABILITATION

## 2023-02-22 PROCEDURE — 99213 OFFICE O/P EST LOW 20 MIN: CPT | Mod: S$GLB,,, | Performed by: PHYSICAL MEDICINE & REHABILITATION

## 2023-02-22 PROCEDURE — 99213 PR OFFICE/OUTPT VISIT, EST, LEVL III, 20-29 MIN: ICD-10-PCS | Mod: S$GLB,,, | Performed by: PHYSICAL MEDICINE & REHABILITATION

## 2023-02-22 PROCEDURE — 3044F PR MOST RECENT HEMOGLOBIN A1C LEVEL <7.0%: ICD-10-PCS | Mod: CPTII,S$GLB,, | Performed by: PHYSICAL MEDICINE & REHABILITATION

## 2023-02-22 NOTE — TELEPHONE ENCOUNTER
----- Message from Cuate Sharp MD sent at 2/22/2023 10:07 AM CST -----  Please schedule for interlaminar injection T7-8

## 2023-02-22 NOTE — PROGRESS NOTES
"  SUBJECTIVE:    Patient ID: Swapnil Dillard is a 32 y.o. male.    Chief Complaint: Back Pain ([facility] referral PT completed)    He presents today with ongoing complaints of pain in the midthoracic region.  Symptoms have not changed.  He completed a course of physical therapy and has been doing a home exercise program with little benefit.  Pain level is 5/10.  He is interested in epidural steroid injection        Past Medical History:   Diagnosis Date    Anxiety     Conjunctivitis     Corneal abrasion 2014    Depression     History of endoscopy 12/14/2018    Nissen fundoplication was formed. Wrap appears to be intact. Mild schatzki ring. Dilated. No gross lesions in the duodenal bulb and second part of the duodenum. Dr. Elmira Zaman    ADRIAN (obstructive sleep apnea) 2010     Social History     Socioeconomic History    Marital status:      Spouse name: Chidi    Number of children: 0   Occupational History     Comment: [employer] go[employer]   Tobacco Use    Smoking status: Never    Smokeless tobacco: Never   Substance and Sexual Activity    Alcohol use: Yes     Comment: very rarely    Drug use: No    Sexual activity: Yes     Partners: Female     Past Surgical History:   Procedure Laterality Date    LAPAROSCOPIC NISSEN FUNDOPLICATION       Family History   Problem Relation Age of Onset    Hypertension Father     Melanoma Neg Hx     Psoriasis Neg Hx     Eczema Neg Hx     Lupus Neg Hx      Vitals:    02/22/23 0953   Weight: 115.7 kg (255 lb 1.2 oz)   Height: 5' 10" (1.778 m)       Review of Systems   Constitutional:  Negative for chills, diaphoresis, fatigue, fever and unexpected weight change.   HENT:  Negative for trouble swallowing.    Eyes:  Negative for visual disturbance.   Respiratory:  Negative for shortness of breath.    Cardiovascular:  Negative for chest pain.   Gastrointestinal:  Negative for abdominal pain, constipation, diarrhea, nausea and vomiting.   Genitourinary:  Negative for difficulty " urinating.   Musculoskeletal:  Negative for arthralgias, back pain, gait problem, joint swelling, myalgias, neck pain and neck stiffness.   Neurological:  Negative for dizziness, speech difficulty, weakness, light-headedness, numbness and headaches.        Objective:      Physical Exam  Neurological:      Mental Status: He is alert and oriented to person, place, and time.      Comments: Mild tenderness palpation midthoracic paraspinous musculature with no palpable masses           Assessment:       1. Pain in thoracic spine             Plan:     Not improved to his satisfaction after an adequate trial of physical therapy and home exercise program.  As previously discussed we move on to interlaminar injections at T7-8.  If he fails that consider 2nd opinion with Dr. Norris.  Follow-up with me after the procedure      Pain in thoracic spine

## 2023-02-22 NOTE — PROGRESS NOTES
SUBJECTIVE:   HPI: Swapnil Dillard  is a 32 y.o. male who presents for annual physical .   Annual Exam and Migraine    Mr Dillard is here today for his annual exam.  He has been taking his vitamin supplements. Doing well on current meds. Has chronic migraine headaches. Usually takes otc migraine meds but asking for rx meds that can help. Otc meds are not helping anymore. Following with Dr Norris for back pain    Anxiety  Presents for follow-up visit. Symptoms include depressed mood and excessive worry. Patient reports no chest pain, confusion, decreased concentration, dizziness, feeling of choking, insomnia, irritability, nervous/anxious behavior, palpitations, panic, shortness of breath or suicidal ideas. Symptoms occur occasionally. The severity of symptoms is mild. The quality of sleep is good. Nighttime awakenings: occasional.     Compliance with medications is %.   Depression  Visit Type: follow-up  Patient presents with the following symptoms: depressed mood and excessive worry.  Patient is not experiencing: choking sensation, confusion, decreased concentration, feelings of hopelessness, feelings of worthlessness, insomnia, irritability, nervousness/anxiety, palpitations, panic, shortness of breath, suicidal ideas, suicidal planning, thoughts of death and weight gain.  Frequency of symptoms: occasionally   Severity: mild   Sleep quality: good  Nighttime awakenings: occasional  Compliance with medications:  %    Migraine   This is a recurrent problem. The current episode started more than 1 year ago. The problem occurs seasonly. The problem has been unchanged. The pain is located in the Bilateral region. The pain does not radiate. The pain quality is similar to prior headaches. The quality of the pain is described as aching. The pain is moderate. Associated symptoms include back pain. Pertinent negatives include no abdominal pain, dizziness, eye pain, hearing loss, insomnia, neck pain, photophobia,  rhinorrhea, seizures, vomiting or weakness. The symptoms are aggravated by weather changes. He has tried NSAIDs for the symptoms. The treatment provided mild relief. His past medical history is significant for migraine headaches.   (Not in a hospital admission)    Review of patient's allergies indicates:  No Known Allergies  Current Outpatient Medications on File Prior to Visit   Medication Sig Dispense Refill    [DISCONTINUED] VIIBRYD 20 mg Tab Take 1 tablet (20 mg total) by mouth once daily. 90 tablet 1     Current Facility-Administered Medications on File Prior to Visit   Medication Dose Route Frequency Provider Last Rate Last Admin    methylPREDNISolone acetate injection 40 mg  40 mg Intramuscular 1 time in Clinic/HOD Cuate Sharp MD         Past Medical History:   Diagnosis Date    Anxiety     Conjunctivitis     Corneal abrasion 2014    Depression     History of endoscopy 12/14/2018    Nissen fundoplication was formed. Wrap appears to be intact. Mild schatzki ring. Dilated. No gross lesions in the duodenal bulb and second part of the duodenum. Dr. Elmira Zaman    Migraine     ADRIAN (obstructive sleep apnea) 2010     Past Surgical History:   Procedure Laterality Date    LAPAROSCOPIC NISSEN FUNDOPLICATION       Family History   Problem Relation Age of Onset    Hypertension Father     Melanoma Neg Hx     Psoriasis Neg Hx     Eczema Neg Hx     Lupus Neg Hx      Social History     Tobacco Use    Smoking status: Never    Smokeless tobacco: Never   Substance Use Topics    Alcohol use: Yes     Comment: very rarely    Drug use: No      Health Maintenance Topics with due status: Not Due       Topic Last Completion Date    Colonoscopy 09/21/2018     Immunization History   Administered Date(s) Administered    COVID-19, MRNA, LN-S, PF (MODERNA FULL 0.5 ML DOSE) 03/17/2021, 04/16/2021, 01/14/2022    DTP 02/22/1989, 05/17/1989, 11/15/1989, 06/19/1990, 10/28/1993, 06/19/1995    HIB 06/19/1993    Hepatitis B,  "Pediatric/Adolescent 10/27/2000, 11/29/2000, 04/25/2001    MMR 06/19/1990, 10/28/1993    Meningococcal Conjugate (MCV4P) 05/25/2007    OPV 02/22/1989, 05/17/1989, 06/19/1990, 10/28/1993    PPD Test 07/06/2007    Tdap 05/27/2007       Review of Systems   Constitutional:  Positive for activity change. Negative for appetite change, chills, diaphoresis, irritability, unexpected weight change and weight gain.   HENT:  Negative for ear discharge, facial swelling, hearing loss, nosebleeds, rhinorrhea and trouble swallowing.    Eyes:  Negative for photophobia, pain, discharge and visual disturbance.   Respiratory:  Negative for apnea, choking, chest tightness, shortness of breath and wheezing.    Cardiovascular:  Negative for chest pain and palpitations.   Gastrointestinal:  Negative for abdominal pain, blood in stool, constipation, diarrhea and vomiting.   Endocrine: Negative for polydipsia, polyphagia and polyuria.   Genitourinary:  Negative for difficulty urinating, hematuria and urgency.   Musculoskeletal:  Positive for back pain. Negative for arthralgias, gait problem, joint swelling and neck pain.   Skin:  Negative for pallor.   Neurological:  Positive for headaches. Negative for dizziness, seizures, speech difficulty and weakness.   Hematological:  Does not bruise/bleed easily.   Psychiatric/Behavioral:  Positive for depression. Negative for agitation, confusion, decreased concentration, dysphoric mood, self-injury, sleep disturbance and suicidal ideas. The patient is not nervous/anxious and does not have insomnia.     OBJECTIVE:      Vitals:    02/23/23 0738   BP: 100/80   Pulse: 110   Temp: 98.1 °F (36.7 °C)   SpO2: 97%   Weight: 117.5 kg (259 lb)   Height: 5' 10" (1.778 m)     Physical Exam  Vitals and nursing note reviewed.   Constitutional:       General: He is not in acute distress.     Appearance: He is well-developed.   HENT:      Head: Normocephalic and atraumatic.      Nose: Nose normal.      Mouth/Throat: "      Pharynx: Uvula midline.   Eyes:      General: Lids are normal.      Conjunctiva/sclera: Conjunctivae normal.      Pupils: Pupils are equal, round, and reactive to light.      Right eye: Pupil is round and reactive.      Left eye: Pupil is round and reactive.   Neck:      Thyroid: No thyromegaly.      Vascular: No carotid bruit.   Cardiovascular:      Rate and Rhythm: Normal rate and regular rhythm.      Pulses: Normal pulses.      Heart sounds: Normal heart sounds.   Pulmonary:      Effort: Pulmonary effort is normal.      Breath sounds: Normal breath sounds.   Abdominal:      General: Bowel sounds are normal.      Palpations: Abdomen is soft. Abdomen is not rigid.      Tenderness: There is no abdominal tenderness.   Musculoskeletal:         General: Normal range of motion.      Cervical back: Normal range of motion and neck supple.      Right lower leg: No edema.      Left lower leg: No edema.   Lymphadenopathy:      Cervical: No cervical adenopathy.   Skin:     General: Skin is warm and dry.      Nails: There is no clubbing.   Neurological:      Mental Status: He is alert and oriented to person, place, and time.   Psychiatric:         Mood and Affect: Mood normal.         Speech: Speech normal.         Behavior: Behavior normal. Behavior is cooperative.         Thought Content: Thought content normal.         Judgment: Judgment normal.      No visits with results within 1 Month(s) from this visit.   Latest known visit with results is:   Office Visit on 08/22/2022   Component Date Value Ref Range Status    WBC 02/11/2023 5.6  3.8 - 10.8 Thousand/uL Final    RBC 02/11/2023 4.81  4.20 - 5.80 Million/uL Final    Hemoglobin 02/11/2023 14.7  13.2 - 17.1 g/dL Final    Hematocrit 02/11/2023 42.9  38.5 - 50.0 % Final    MCV 02/11/2023 89.2  80.0 - 100.0 fL Final    MCH 02/11/2023 30.6  27.0 - 33.0 pg Final    MCHC 02/11/2023 34.3  32.0 - 36.0 g/dL Final    RDW 02/11/2023 11.9  11.0 - 15.0 % Final    Platelets  02/11/2023 284  140 - 400 Thousand/uL Final    MPV 02/11/2023 10.6  7.5 - 12.5 fL Final    Neutrophils, Abs 02/11/2023 2,990  1,500 - 7,800 cells/uL Final    Lymph # 02/11/2023 1,882  850 - 3,900 cells/uL Final    Mono # 02/11/2023 599  200 - 950 cells/uL Final    Eos # 02/11/2023 78  15 - 500 cells/uL Final    Baso # 02/11/2023 50  0 - 200 cells/uL Final    Neutrophils Relative 02/11/2023 53.4  % Final    Lymph % 02/11/2023 33.6  % Final    Mono % 02/11/2023 10.7  % Final    Eosinophil % 02/11/2023 1.4  % Final    Basophil % 02/11/2023 0.9  % Final    Glucose 02/11/2023 98  65 - 99 mg/dL Final    Comment:               Fasting reference interval         BUN 02/11/2023 9  7 - 25 mg/dL Final    Creatinine 02/11/2023 1.07  0.60 - 1.26 mg/dL Final    eGFR 02/11/2023 93  > OR = 60 mL/min/1.73m2 Final    Comment: The eGFR is based on the CKD-EPI 2021 equation. To calculate   the new eGFR from a previous Creatinine or Cystatin C  result, go to https://www.kidney.org/professionals/  kdoqi/gfr%5Fcalculator      BUN/Creatinine Ratio 02/11/2023 NOT APPLICABLE  6 - 22 (calc) Final    Sodium 02/11/2023 141  135 - 146 mmol/L Final    Potassium 02/11/2023 4.1  3.5 - 5.3 mmol/L Final    Chloride 02/11/2023 104  98 - 110 mmol/L Final    CO2 02/11/2023 30  20 - 32 mmol/L Final    Calcium 02/11/2023 9.2  8.6 - 10.3 mg/dL Final    Total Protein 02/11/2023 6.6  6.1 - 8.1 g/dL Final    Albumin 02/11/2023 4.0  3.6 - 5.1 g/dL Final    Globulin, Total 02/11/2023 2.6  1.9 - 3.7 g/dL (calc) Final    Albumin/Globulin Ratio 02/11/2023 1.5  1.0 - 2.5 (calc) Final    Total Bilirubin 02/11/2023 0.4  0.2 - 1.2 mg/dL Final    Alkaline Phosphatase 02/11/2023 51  36 - 130 U/L Final    AST 02/11/2023 17  10 - 40 U/L Final    ALT 02/11/2023 19  9 - 46 U/L Final    TSH 02/11/2023 1.20  0.40 - 4.50 mIU/L Final    Color, UA 02/11/2023 YELLOW  YELLOW Final    Appearance, UA 02/11/2023 CLEAR  CLEAR Final    Specific Gravity, UA 02/11/2023 1.014  1.001 -  1.035 Final    pH, UA 02/11/2023 7.0  5.0 - 8.0 Final    Glucose, UA 02/11/2023 NEGATIVE  NEGATIVE Final    Bilirubin, UA 02/11/2023 NEGATIVE  NEGATIVE Final    Ketones, UA 02/11/2023 NEGATIVE  NEGATIVE Final    Occult Blood UA 02/11/2023 NEGATIVE  NEGATIVE Final    Protein, UA 02/11/2023 NEGATIVE  NEGATIVE Final    Nitrite, UA 02/11/2023 NEGATIVE  NEGATIVE Final    Leukocytes, UA 02/11/2023 NEGATIVE  NEGATIVE Final    Vitamin D, 25-OH, Total 02/11/2023 43  30 - 100 ng/mL Final    Comment: Vitamin D Status         25-OH Vitamin D:     Deficiency:                    <20 ng/mL  Insufficiency:             20 - 29 ng/mL  Optimal:                 > or = 30 ng/mL     For 25-OH Vitamin D testing on patients on   D2-supplementation and patients for whom quantitation   of D2 and D3 fractions is required, the QuestAssureD(TM)  25-OH VIT D, (D2,D3), LC/MS/MS is recommended: order   code 42511 (patients >2yrs).  See Note 1     Note 1     For additional information, please refer to   http://education.[employer].Flavorvanil/faq/SLW260   (This link is being provided for informational/  educational purposes only.)      Vitamin B-12 02/11/2023 1,150 (H)  200 - 1,100 pg/mL Final    Hemoglobin A1C 02/11/2023 5.5  <5.7 % of total Hgb Final    Comment: For the purpose of screening for the presence of  diabetes:     <5.7%       Consistent with the absence of diabetes  5.7-6.4%    Consistent with increased risk for diabetes              (prediabetes)  > or =6.5%  Consistent with diabetes     This assay result is consistent with a decreased risk  of diabetes.     Currently, no consensus exists regarding use of  hemoglobin A1c for diagnosis of diabetes in children.     According to American Diabetes Association (ADA)  guidelines, hemoglobin A1c <7.0% represents optimal  control in non-pregnant diabetic patients. Different  metrics may apply to specific patient populations.   Standards of Medical Care in Diabetes(ADA).          Cholesterol  02/11/2023 216 (H)  <200 mg/dL Final    HDL 02/11/2023 45  > OR = 40 mg/dL Final    Triglycerides 02/11/2023 235 (H)  <150 mg/dL Final    Comment:    If a non-fasting specimen was collected, consider  repeat triglyceride testing on a fasting specimen  if clinically indicated.   Palak et al. J. of Clin. Lipidol. 2015;9:129-169.         LDL Cholesterol 02/11/2023 133 (H)  mg/dL (calc) Final    Comment: Reference range: <100     Desirable range <100 mg/dL for primary prevention;    <70 mg/dL for patients with CHD or diabetic patients   with > or = 2 CHD risk factors.     LDL-C is now calculated using the Hair-Mueller   calculation, which is a validated novel method providing   better accuracy than the Friedewald equation in the   estimation of LDL-C.   Hair DEMPSEY et al. NEPTALI. 2013;310(19): 5854-0641   (http://education.[employer]/faq/LCG728)      HDL/Cholesterol Ratio 02/11/2023 4.8  <5.0 (calc) Final    Non HDL Chol. (LDL+VLDL) 02/11/2023 171 (H)  <130 mg/dL (calc) Final    Comment: For patients with diabetes plus 1 major ASCVD risk   factor, treating to a non-HDL-C goal of <100 mg/dL   (LDL-C of <70 mg/dL) is considered a therapeutic   option.     ]  Assessment:       1. Preventative health care    2. Depression with anxiety    3. Vitamin D deficiency    4. Vitamin B 12 deficiency    5. Other migraine without status migrainosus, not intractable        Plan:       Preventative health care  Counseled on age and gender appropriate medical preventative services, including cancer screenings, immunizations, overall nutritional health, need for a consistent exercise regimen and an overall push towards maintaining a vigorous and active lifestyle.     Depression with anxiety  -     VIIBRYD 20 mg Tab; Take 1 tablet (20 mg total) by mouth once daily.  Dispense: 90 tablet; Refill: 1    Vitamin D deficiency  Cont otc supplement    Vitamin B 12 deficiency  Decrease to 500mcg 3x/week    Other migraine without status  migrainosus, not intractable  -    start naproxen (NAPROSYN) 500 MG tablet; Take 1 tablet (500 mg total) by mouth daily as needed (migrain).  Dispense: 30 tablet; Refill: 0  -  start   rizatriptan (MAXALT) 10 MG tablet; Take 1 tablet (10 mg total) by mouth as needed for Migraine.  Dispense: 20 tablet; Refill: 1             Follow up in about 6 months (around 8/23/2023) for anxiety.

## 2023-02-22 NOTE — H&P (VIEW-ONLY)
"  SUBJECTIVE:    Patient ID: Swapnil Dillard is a 32 y.o. male.    Chief Complaint: Back Pain (JANETH referral PT completed)    He presents today with ongoing complaints of pain in the midthoracic region.  Symptoms have not changed.  He completed a course of physical therapy and has been doing a home exercise program with little benefit.  Pain level is 5/10.  He is interested in epidural steroid injection        Past Medical History:   Diagnosis Date    Anxiety     Conjunctivitis     Corneal abrasion 2014    Depression     History of endoscopy 12/14/2018    Nissen fundoplication was formed. Wrap appears to be intact. Mild schatzki ring. Dilated. No gross lesions in the duodenal bulb and second part of the duodenum. Dr. Elmira Zaman    ADRIAN (obstructive sleep apnea) 2010     Social History     Socioeconomic History    Marital status:      Spouse name: Chidi    Number of children: 0   Occupational History     Comment: [employer] go[employer]   Tobacco Use    Smoking status: Never    Smokeless tobacco: Never   Substance and Sexual Activity    Alcohol use: Yes     Comment: very rarely    Drug use: No    Sexual activity: Yes     Partners: Female     Past Surgical History:   Procedure Laterality Date    LAPAROSCOPIC NISSEN FUNDOPLICATION       Family History   Problem Relation Age of Onset    Hypertension Father     Melanoma Neg Hx     Psoriasis Neg Hx     Eczema Neg Hx     Lupus Neg Hx      Vitals:    02/22/23 0953   Weight: 115.7 kg (255 lb 1.2 oz)   Height: 5' 10" (1.778 m)       Review of Systems   Constitutional:  Negative for chills, diaphoresis, fatigue, fever and unexpected weight change.   HENT:  Negative for trouble swallowing.    Eyes:  Negative for visual disturbance.   Respiratory:  Negative for shortness of breath.    Cardiovascular:  Negative for chest pain.   Gastrointestinal:  Negative for abdominal pain, constipation, diarrhea, nausea and vomiting.   Genitourinary:  Negative for difficulty " urinating.   Musculoskeletal:  Negative for arthralgias, back pain, gait problem, joint swelling, myalgias, neck pain and neck stiffness.   Neurological:  Negative for dizziness, speech difficulty, weakness, light-headedness, numbness and headaches.        Objective:      Physical Exam  Neurological:      Mental Status: He is alert and oriented to person, place, and time.      Comments: Mild tenderness palpation midthoracic paraspinous musculature with no palpable masses           Assessment:       1. Pain in thoracic spine             Plan:     Not improved to his satisfaction after an adequate trial of physical therapy and home exercise program.  As previously discussed we move on to interlaminar injections at T7-8.  If he fails that consider 2nd opinion with Dr. Norris.  Follow-up with me after the procedure      Pain in thoracic spine

## 2023-02-22 NOTE — TELEPHONE ENCOUNTER
----- Message from Cuate Sharp MD sent at 2/22/2023 10:02 AM CST -----  Please schedule for interlaminar injection T7-8

## 2023-02-23 ENCOUNTER — OFFICE VISIT (OUTPATIENT)
Dept: FAMILY MEDICINE | Facility: CLINIC | Age: 33
End: 2023-02-23
Payer: COMMERCIAL

## 2023-02-23 VITALS
DIASTOLIC BLOOD PRESSURE: 80 MMHG | BODY MASS INDEX: 37.08 KG/M2 | SYSTOLIC BLOOD PRESSURE: 100 MMHG | WEIGHT: 259 LBS | HEART RATE: 110 BPM | HEIGHT: 70 IN | TEMPERATURE: 98 F | OXYGEN SATURATION: 97 %

## 2023-02-23 DIAGNOSIS — Z00.00 PREVENTATIVE HEALTH CARE: Primary | ICD-10-CM

## 2023-02-23 DIAGNOSIS — G43.809 OTHER MIGRAINE WITHOUT STATUS MIGRAINOSUS, NOT INTRACTABLE: ICD-10-CM

## 2023-02-23 DIAGNOSIS — F41.8 DEPRESSION WITH ANXIETY: ICD-10-CM

## 2023-02-23 DIAGNOSIS — E55.9 VITAMIN D DEFICIENCY: ICD-10-CM

## 2023-02-23 DIAGNOSIS — E53.8 VITAMIN B 12 DEFICIENCY: ICD-10-CM

## 2023-02-23 PROCEDURE — 99395 PREV VISIT EST AGE 18-39: CPT | Mod: S$GLB,,, | Performed by: NURSE PRACTITIONER

## 2023-02-23 PROCEDURE — 3079F DIAST BP 80-89 MM HG: CPT | Mod: CPTII,S$GLB,, | Performed by: NURSE PRACTITIONER

## 2023-02-23 PROCEDURE — 1159F PR MEDICATION LIST DOCUMENTED IN MEDICAL RECORD: ICD-10-PCS | Mod: CPTII,S$GLB,, | Performed by: NURSE PRACTITIONER

## 2023-02-23 PROCEDURE — 3008F PR BODY MASS INDEX (BMI) DOCUMENTED: ICD-10-PCS | Mod: CPTII,S$GLB,, | Performed by: NURSE PRACTITIONER

## 2023-02-23 PROCEDURE — 3044F PR MOST RECENT HEMOGLOBIN A1C LEVEL <7.0%: ICD-10-PCS | Mod: CPTII,S$GLB,, | Performed by: NURSE PRACTITIONER

## 2023-02-23 PROCEDURE — 3008F BODY MASS INDEX DOCD: CPT | Mod: CPTII,S$GLB,, | Performed by: NURSE PRACTITIONER

## 2023-02-23 PROCEDURE — 3079F PR MOST RECENT DIASTOLIC BLOOD PRESSURE 80-89 MM HG: ICD-10-PCS | Mod: CPTII,S$GLB,, | Performed by: NURSE PRACTITIONER

## 2023-02-23 PROCEDURE — 1160F RVW MEDS BY RX/DR IN RCRD: CPT | Mod: CPTII,S$GLB,, | Performed by: NURSE PRACTITIONER

## 2023-02-23 PROCEDURE — 3074F SYST BP LT 130 MM HG: CPT | Mod: CPTII,S$GLB,, | Performed by: NURSE PRACTITIONER

## 2023-02-23 PROCEDURE — 1160F PR REVIEW ALL MEDS BY PRESCRIBER/CLIN PHARMACIST DOCUMENTED: ICD-10-PCS | Mod: CPTII,S$GLB,, | Performed by: NURSE PRACTITIONER

## 2023-02-23 PROCEDURE — 99395 PR PREVENTIVE VISIT,EST,18-39: ICD-10-PCS | Mod: S$GLB,,, | Performed by: NURSE PRACTITIONER

## 2023-02-23 PROCEDURE — 3074F PR MOST RECENT SYSTOLIC BLOOD PRESSURE < 130 MM HG: ICD-10-PCS | Mod: CPTII,S$GLB,, | Performed by: NURSE PRACTITIONER

## 2023-02-23 PROCEDURE — 3044F HG A1C LEVEL LT 7.0%: CPT | Mod: CPTII,S$GLB,, | Performed by: NURSE PRACTITIONER

## 2023-02-23 PROCEDURE — 1159F MED LIST DOCD IN RCRD: CPT | Mod: CPTII,S$GLB,, | Performed by: NURSE PRACTITIONER

## 2023-02-23 RX ORDER — VILAZODONE HYDROCHLORIDE 20 MG/1
1 TABLET ORAL DAILY
Qty: 90 TABLET | Refills: 1 | Status: SHIPPED | OUTPATIENT
Start: 2023-02-23 | End: 2023-06-12

## 2023-02-23 RX ORDER — NAPROXEN 500 MG/1
500 TABLET ORAL DAILY PRN
Qty: 30 TABLET | Refills: 0 | Status: SHIPPED | OUTPATIENT
Start: 2023-02-23

## 2023-02-23 RX ORDER — RIZATRIPTAN BENZOATE 10 MG/1
10 TABLET ORAL
Qty: 20 TABLET | Refills: 1 | Status: SHIPPED | OUTPATIENT
Start: 2023-02-23 | End: 2024-03-01

## 2023-03-24 ENCOUNTER — PATIENT MESSAGE (OUTPATIENT)
Dept: FAMILY MEDICINE | Facility: CLINIC | Age: 33
End: 2023-03-24

## 2023-03-24 ENCOUNTER — HOSPITAL ENCOUNTER (OUTPATIENT)
Facility: AMBULARY SURGERY CENTER | Age: 33
Discharge: HOME | End: 2023-03-24
Attending: ANESTHESIOLOGY | Admitting: ANESTHESIOLOGY
Payer: COMMERCIAL

## 2023-03-24 VITALS
RESPIRATION RATE: 20 BRPM | HEART RATE: 86 BPM | HEIGHT: 70 IN | OXYGEN SATURATION: 96 % | TEMPERATURE: 98 F | WEIGHT: 259 LBS | DIASTOLIC BLOOD PRESSURE: 74 MMHG | BODY MASS INDEX: 37.08 KG/M2 | SYSTOLIC BLOOD PRESSURE: 130 MMHG

## 2023-03-24 DIAGNOSIS — M54.14 THORACIC RADICULITIS: ICD-10-CM

## 2023-03-24 PROCEDURE — 62321 NJX INTERLAMINAR CRV/THRC: CPT | Performed by: ANESTHESIOLOGY

## 2023-03-24 PROCEDURE — 62321 PR INJ CERV/THORAC, W/GUIDANCE: ICD-10-PCS | Mod: ,,, | Performed by: ANESTHESIOLOGY

## 2023-03-24 PROCEDURE — 62321 NJX INTERLAMINAR CRV/THRC: CPT | Mod: ,,, | Performed by: ANESTHESIOLOGY

## 2023-03-24 RX ORDER — SODIUM CHLORIDE 9 MG/ML
INJECTION, SOLUTION INTRAMUSCULAR; INTRAVENOUS; SUBCUTANEOUS
Status: DISCONTINUED | OUTPATIENT
Start: 2023-03-24 | End: 2023-03-24 | Stop reason: HOSPADM

## 2023-03-24 RX ORDER — SODIUM CHLORIDE, SODIUM LACTATE, POTASSIUM CHLORIDE, CALCIUM CHLORIDE 600; 310; 30; 20 MG/100ML; MG/100ML; MG/100ML; MG/100ML
INJECTION, SOLUTION INTRAVENOUS CONTINUOUS
Status: ACTIVE | OUTPATIENT
Start: 2023-03-24

## 2023-03-24 RX ORDER — MIDAZOLAM HYDROCHLORIDE 1 MG/ML
INJECTION INTRAMUSCULAR; INTRAVENOUS
Status: DISCONTINUED | OUTPATIENT
Start: 2023-03-24 | End: 2023-03-24 | Stop reason: HOSPADM

## 2023-03-24 RX ORDER — FENTANYL CITRATE 50 UG/ML
INJECTION, SOLUTION INTRAMUSCULAR; INTRAVENOUS
Status: DISCONTINUED | OUTPATIENT
Start: 2023-03-24 | End: 2023-03-24 | Stop reason: HOSPADM

## 2023-03-24 RX ORDER — LIDOCAINE HYDROCHLORIDE 10 MG/ML
INJECTION, SOLUTION EPIDURAL; INFILTRATION; INTRACAUDAL; PERINEURAL
Status: DISCONTINUED | OUTPATIENT
Start: 2023-03-24 | End: 2023-03-24 | Stop reason: HOSPADM

## 2023-03-24 RX ORDER — DEXAMETHASONE SODIUM PHOSPHATE 10 MG/ML
INJECTION INTRAMUSCULAR; INTRAVENOUS
Status: DISCONTINUED | OUTPATIENT
Start: 2023-03-24 | End: 2023-03-24 | Stop reason: HOSPADM

## 2023-03-24 RX ADMIN — SODIUM CHLORIDE, SODIUM LACTATE, POTASSIUM CHLORIDE, CALCIUM CHLORIDE: 600; 310; 30; 20 INJECTION, SOLUTION INTRAVENOUS at 08:03

## 2023-03-24 NOTE — PLAN OF CARE
Stable, states ready to go home, jsoie po fluids, pain is less, no leg weakness, ambulated to  car with RN to wife

## 2023-03-24 NOTE — OP NOTE
PROCEDURE DATE: 3/24/2023    Procedure:   Interlaminar epidural steroid injection at T7-8  under fluoroscopic guidance.    Diagnosis: Thoracic radiculitis  pOSTOP DIAGNOSIS: sAME    Physician: Scooter Norris M.D.    Medications injected:10 mg dexamethasone with 4 ml of preservative free NaCl    Local anesthetic injected:    Lidocaine 1% 2 ml total    Sedation Medications: RN IV sedation    Estimated blood loss:  NOne    Complications:  None    Technique:  Time-out taken to identify patient and procedure prior to starting the procedure.  With the patient laying in a prone position, the area was prepped and draped in the usual sterile fashion using ChloraPrep and a fenestrated drape.  After determining the target level with an AP fluoroscopic view, local anesthetic was given using a 25-gauge 1.5 inch needle by raising a wheal and then infiltrating toward the interlaminar entry space.  A 3.5inch 20-gauge Touhy needle was introduced under AP fluoroscopic guidance to the interlaminar space of T7-8. Once the trajectory was established, the needle was visualized in the lateral view and advanced using loss of resistance technique. Once in the desired position, 1ml contrast was injected to confirm placement and there was no vascular uptake nor intrathecal spread.  The medication was then injected slowly. The patient tolerated the procedure well.      The patient was monitored after the procedure.   They were given post-procedure and discharge instructions to follow at home.  The patient was discharged in a stable condition.

## 2023-03-24 NOTE — DISCHARGE SUMMARY
[facility]  Discharge Note  Short Stay    Procedure(s) (LRB):  INJECTION, STEROID, SPINE, THORACIC, EPIDURAL (N/A)      OUTCOME: Patient tolerated treatment/procedure well without complication and is now ready for discharge.    DISPOSITION: Home or Self Care    FINAL DIAGNOSIS:  <principal problem not specified>    FOLLOWUP: In clinic    DISCHARGE INSTRUCTIONS:    Discharge Procedure Orders   Notify your health care provider if you experience any of the following:  temperature >100.4     Notify your health care provider if you experience any of the following:  severe uncontrolled pain     Notify your health care provider if you experience any of the following:  redness, tenderness, or signs of infection (pain, swelling, redness, odor or green/yellow discharge around incision site)     Activity as tolerated        TIME SPENT ON DISCHARGE: 30 minutes

## 2023-04-25 ENCOUNTER — OFFICE VISIT (OUTPATIENT)
Dept: SPINE | Facility: CLINIC | Age: 33
End: 2023-04-25
Payer: COMMERCIAL

## 2023-04-25 VITALS — BODY MASS INDEX: 37.08 KG/M2 | HEIGHT: 70 IN | WEIGHT: 259 LBS

## 2023-04-25 DIAGNOSIS — M54.6 PAIN IN THORACIC SPINE: Primary | ICD-10-CM

## 2023-04-25 PROCEDURE — 1159F PR MEDICATION LIST DOCUMENTED IN MEDICAL RECORD: ICD-10-PCS | Mod: CPTII,S$GLB,, | Performed by: PHYSICAL MEDICINE & REHABILITATION

## 2023-04-25 PROCEDURE — 3044F HG A1C LEVEL LT 7.0%: CPT | Mod: CPTII,S$GLB,, | Performed by: PHYSICAL MEDICINE & REHABILITATION

## 2023-04-25 PROCEDURE — 1159F MED LIST DOCD IN RCRD: CPT | Mod: CPTII,S$GLB,, | Performed by: PHYSICAL MEDICINE & REHABILITATION

## 2023-04-25 PROCEDURE — 99213 OFFICE O/P EST LOW 20 MIN: CPT | Mod: S$GLB,,, | Performed by: PHYSICAL MEDICINE & REHABILITATION

## 2023-04-25 PROCEDURE — 1160F RVW MEDS BY RX/DR IN RCRD: CPT | Mod: CPTII,S$GLB,, | Performed by: PHYSICAL MEDICINE & REHABILITATION

## 2023-04-25 PROCEDURE — 3008F BODY MASS INDEX DOCD: CPT | Mod: CPTII,S$GLB,, | Performed by: PHYSICAL MEDICINE & REHABILITATION

## 2023-04-25 PROCEDURE — 3044F PR MOST RECENT HEMOGLOBIN A1C LEVEL <7.0%: ICD-10-PCS | Mod: CPTII,S$GLB,, | Performed by: PHYSICAL MEDICINE & REHABILITATION

## 2023-04-25 PROCEDURE — 3008F PR BODY MASS INDEX (BMI) DOCUMENTED: ICD-10-PCS | Mod: CPTII,S$GLB,, | Performed by: PHYSICAL MEDICINE & REHABILITATION

## 2023-04-25 PROCEDURE — 1160F PR REVIEW ALL MEDS BY PRESCRIBER/CLIN PHARMACIST DOCUMENTED: ICD-10-PCS | Mod: CPTII,S$GLB,, | Performed by: PHYSICAL MEDICINE & REHABILITATION

## 2023-04-25 PROCEDURE — 99213 PR OFFICE/OUTPT VISIT, EST, LEVL III, 20-29 MIN: ICD-10-PCS | Mod: S$GLB,,, | Performed by: PHYSICAL MEDICINE & REHABILITATION

## 2023-04-25 NOTE — PROGRESS NOTES
"  SUBJECTIVE:    Patient ID: Swapnil Dillard is a 32 y.o. male.    Chief Complaint: Follow-up (JANETH 3/24 T7-8)    He is here for follow-up status post interlaminar injection T7-T8 on 03/24/2023 with Dr. Norris.  He describes 80% improvement in his mid back pain symptoms with improved functional mobility.  He is currently pain-free and satisfied with his quality of life.  He has no new or progressive problems        Past Medical History:   Diagnosis Date    Anxiety     Conjunctivitis     Corneal abrasion 2014    Depression     History of endoscopy 12/14/2018    Nissen fundoplication was formed. Wrap appears to be intact. Mild schatzki ring. Dilated. No gross lesions in the duodenal bulb and second part of the duodenum. Dr. 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   Tobacco Use    Smoking status: Never    Smokeless tobacco: Never   Substance and Sexual Activity    Alcohol use: Yes     Comment: very rarely    Drug use: No    Sexual activity: Yes     Partners: Female     Past Surgical History:   Procedure Laterality Date    EPIDURAL STEROID INJECTION INTO THORACIC SPINE N/A 3/24/2023    Procedure: INJECTION, STEROID, SPINE, THORACIC, EPIDURAL;  Surgeon: Scooter Norris MD;  Location: [facility] OR;  Service: Pain Management;  Laterality: N/A;  T7-T8  Dr Sharp    LAPAROSCOPIC NISSEN FUNDOPLICATION       Family History   Problem Relation Age of Onset    Hypertension Father     Melanoma Neg Hx     Psoriasis Neg Hx     Eczema Neg Hx     Lupus Neg Hx      Vitals:    04/25/23 1350   Weight: 117.5 kg (259 lb)   Height: 5' 10" (1.778 m)       Review of Systems   Constitutional:  Negative for chills, diaphoresis, fatigue, fever and unexpected weight change.   HENT:  Negative for trouble swallowing.    Eyes:  Negative for visual disturbance. "   Respiratory:  Negative for shortness of breath.    Cardiovascular:  Negative for chest pain.   Gastrointestinal:  Negative for abdominal pain, constipation, diarrhea, nausea and vomiting.   Genitourinary:  Negative for difficulty urinating.   Musculoskeletal:  Negative for arthralgias, back pain, gait problem, joint swelling, myalgias, neck pain and neck stiffness.   Neurological:  Negative for dizziness, speech difficulty, weakness, light-headedness, numbness and headaches.        Objective:      Physical Exam  Neurological:      Mental Status: He is alert and oriented to person, place, and time.           Assessment:       1. Pain in thoracic spine           Plan:     Improved to his satisfaction status post interlaminar injection T7-T8.  He can repeat that as needed.  Follow-up here as needed      Pain in thoracic spine

## 2023-06-08 DIAGNOSIS — F41.8 DEPRESSION WITH ANXIETY: ICD-10-CM

## 2023-06-12 RX ORDER — VILAZODONE HYDROCHLORIDE 20 MG/1
1 TABLET ORAL DAILY
Qty: 90 TABLET | Refills: 1 | Status: SHIPPED | OUTPATIENT
Start: 2023-06-12 | End: 2023-06-16 | Stop reason: ALTCHOICE

## 2023-06-16 ENCOUNTER — TELEPHONE (OUTPATIENT)
Dept: FAMILY MEDICINE | Facility: CLINIC | Age: 33
End: 2023-06-16

## 2023-06-16 DIAGNOSIS — F41.8 DEPRESSION WITH ANXIETY: Primary | ICD-10-CM

## 2023-06-16 RX ORDER — VENLAFAXINE HYDROCHLORIDE 37.5 MG/1
37.5 CAPSULE, EXTENDED RELEASE ORAL DAILY
Qty: 30 CAPSULE | Refills: 1 | Status: SHIPPED | OUTPATIENT
Start: 2023-06-16 | End: 2023-08-14

## 2023-06-16 NOTE — TELEPHONE ENCOUNTER
Effexor sent. Spoke with patient regarding side effects and how the medication works. Voiced understanding. Has f/u scheduled

## 2023-08-13 DIAGNOSIS — F41.8 DEPRESSION WITH ANXIETY: ICD-10-CM

## 2023-08-14 RX ORDER — VENLAFAXINE HYDROCHLORIDE 37.5 MG/1
37.5 CAPSULE, EXTENDED RELEASE ORAL
Qty: 30 CAPSULE | Refills: 0 | Status: SHIPPED | OUTPATIENT
Start: 2023-08-14 | End: 2023-08-23 | Stop reason: SDUPTHER

## 2023-08-22 NOTE — PROGRESS NOTES
SUBJECTIVE:      Patient ID: Swapnil Dillard is a 32 y.o. male.    Chief Complaint: Anxiety    Mr Dillard is here today to f/u on anxiety/depression and migraine headaches. His insurance no longer covered viibryd, he is now on effexor. Tolerating well and he feels it is managing his depression/anxiety, he says he does still have episodes of anxiety.Headaches are improved with naproxen and maxalt prn. Following with Dr Norris for back pain    Anxiety  Presents for follow-up visit. Symptoms include depressed mood and excessive worry. Patient reports no chest pain, confusion, decreased concentration, dizziness, feeling of choking, insomnia, irritability, nervous/anxious behavior, palpitations, panic, shortness of breath or suicidal ideas. Symptoms occur occasionally. The severity of symptoms is mild. The quality of sleep is good. Nighttime awakenings: occasional.     Compliance with medications is %.   Depression  Visit Type: follow-up  Patient presents with the following symptoms: depressed mood and excessive worry.  Patient is not experiencing: choking sensation, confusion, decreased concentration, feelings of hopelessness, feelings of worthlessness, insomnia, irritability, nervousness/anxiety, palpitations, panic, shortness of breath, suicidal ideas, suicidal planning, thoughts of death and weight gain.  Frequency of symptoms: occasionally   Severity: mild   Sleep quality: good  Nighttime awakenings: occasional  Compliance with medications:  %    Migraine   This is a recurrent problem. The current episode started more than 1 year ago. The problem occurs seasonly. The problem has been gradually improving. The pain is located in the Bilateral region. The pain does not radiate. The pain quality is similar to prior headaches. The quality of the pain is described as aching. The pain is moderate. Associated symptoms include back pain. Pertinent negatives include no abdominal pain, dizziness, eye pain, hearing  loss, insomnia, neck pain, photophobia, rhinorrhea, seizures, vomiting or weakness. The symptoms are aggravated by weather changes. He has tried NSAIDs and triptans for the symptoms. The treatment provided moderate relief. His past medical history is significant for migraine headaches.       Past Surgical History:   Procedure Laterality Date    EPIDURAL STEROID INJECTION INTO THORACIC SPINE N/A 3/24/2023    Procedure: INJECTION, STEROID, SPINE, THORACIC, EPIDURAL;  Surgeon: Scooter Norris MD;  Location: [facility];  Service: Pain Management;  Laterality: N/A;  T7-T8  Dr Sharp    LAPAROSCOPIC NISSEN FUNDOPLICATION       Family History   Problem Relation Age of Onset    Hypertension Father     Melanoma Neg Hx     Psoriasis Neg Hx     Eczema Neg Hx     Lupus Neg Hx       Social History     Socioeconomic History    Marital status:      Spouse name: Chidi    Number of children: 0   Occupational History     Comment: [employer] gov   Tobacco Use    Smoking status: Never     Passive exposure: Never    Smokeless tobacco: Never   Substance and Sexual Activity    Alcohol use: Yes     Comment: very rarely    Drug use: No    Sexual activity: Yes     Partners: Female     Current Outpatient Medications   Medication Sig Dispense Refill    naproxen (NAPROSYN) 500 MG tablet Take 1 tablet (500 mg total) by mouth daily as needed (migrain). 30 tablet 0    rizatriptan (MAXALT) 10 MG tablet Take 1 tablet (10 mg total) by mouth as needed for Migraine. 20 tablet 1    venlafaxine (EFFEXOR-XR) 75 MG 24 hr capsule Take 1 capsule (75 mg total) by mouth once daily. 90 capsule 1     Current Facility-Administered Medications   Medication Dose Route Frequency Provider Last Rate Last Admin    methylPREDNISolone acetate injection 40 mg  40 mg Intramuscular 1 time in Clinic/HOD Cuate Sharp MD         Facility-Administered Medications Ordered in Other Visits   Medication Dose Route Frequency Provider Last Rate Last Admin     lactated ringers infusion   Intravenous Continuous Scooetr Norris MD 25 mL/hr at 03/24/23 0835 New Bag at 03/24/23 0835     Review of patient's allergies indicates:  No Known Allergies   Past Medical History:   Diagnosis Date    Anxiety     Conjunctivitis     Corneal abrasion 2014    Depression     History of endoscopy 12/14/2018    Nissen fundoplication was formed. Wrap appears to be intact. Mild schatzki ring. Dilated. No gross lesions in the duodenal bulb and second part of the duodenum. Dr. Emlira Zaman    Migraine     ADRIAN (obstructive sleep apnea) 2010     Past Surgical History:   Procedure Laterality Date    EPIDURAL STEROID INJECTION INTO THORACIC SPINE N/A 3/24/2023    Procedure: INJECTION, STEROID, SPINE, THORACIC, EPIDURAL;  Surgeon: Scooter Norris MD;  Location: [facility];  Service: Pain Management;  Laterality: N/A;  T7-T8  Dr Sharp    LAPAROSCOPIC NISSEN FUNDOPLICATION         Review of Systems   Constitutional:  Negative for activity change, appetite change, chills, diaphoresis, irritability, unexpected weight change and weight gain.   HENT:  Negative for ear discharge, facial swelling, hearing loss, nosebleeds, rhinorrhea and trouble swallowing.    Eyes:  Negative for photophobia, pain, discharge and visual disturbance.   Respiratory:  Negative for apnea, choking, chest tightness, shortness of breath and wheezing.    Cardiovascular:  Negative for chest pain and palpitations.   Gastrointestinal:  Negative for abdominal pain, blood in stool, constipation, diarrhea and vomiting.   Endocrine: Negative for polydipsia, polyphagia and polyuria.   Genitourinary:  Negative for difficulty urinating, hematuria and urgency.   Musculoskeletal:  Positive for back pain. Negative for arthralgias, gait problem, joint swelling and neck pain.   Skin:  Negative for pallor.   Neurological:  Negative for dizziness, seizures, speech difficulty, weakness and headaches.   Hematological:  Does not bruise/bleed easily.  "  Psychiatric/Behavioral:  Positive for depression. Negative for agitation, confusion, decreased concentration, dysphoric mood and suicidal ideas. The patient is not nervous/anxious and does not have insomnia.       OBJECTIVE:      Vitals:    08/23/23 0738   BP: 110/80   Pulse: 90   Temp: 98.4 °F (36.9 °C)   SpO2: 97%   Weight: 117 kg (258 lb)   Height: 5' 10" (1.778 m)     Physical Exam  Vitals and nursing note reviewed.   Constitutional:       General: He is not in acute distress.     Appearance: He is well-developed.   HENT:      Head: Normocephalic and atraumatic.      Nose: Nose normal.      Mouth/Throat:      Pharynx: Uvula midline.   Eyes:      General: Lids are normal.      Conjunctiva/sclera: Conjunctivae normal.      Pupils: Pupils are equal, round, and reactive to light.      Right eye: Pupil is round and reactive.      Left eye: Pupil is round and reactive.   Neck:      Thyroid: No thyromegaly.      Vascular: No carotid bruit.   Cardiovascular:      Rate and Rhythm: Normal rate and regular rhythm.      Pulses: Normal pulses.      Heart sounds: Normal heart sounds. No murmur heard.  Pulmonary:      Effort: Pulmonary effort is normal.      Breath sounds: Normal breath sounds. No wheezing, rhonchi or rales.   Abdominal:      General: Bowel sounds are normal.      Palpations: Abdomen is soft. Abdomen is not rigid.      Tenderness: There is no abdominal tenderness.   Musculoskeletal:         General: Normal range of motion.      Cervical back: Normal range of motion and neck supple.      Right lower leg: No edema.      Left lower leg: No edema.   Lymphadenopathy:      Cervical: No cervical adenopathy.   Skin:     General: Skin is warm and dry.      Nails: There is no clubbing.   Neurological:      Mental Status: He is alert and oriented to person, place, and time.   Psychiatric:         Mood and Affect: Mood normal.         Speech: Speech normal.         Behavior: Behavior normal. Behavior is cooperative.    "      Thought Content: Thought content normal.         Judgment: Judgment normal.        Last visit note, most recent available labs, and health maintenance reviewed    Assessment:       1. Depression with anxiety    2. Vitamin D deficiency    3. Vitamin B 12 deficiency    4. Other migraine without status migrainosus, not intractable        Plan:       Depression with anxiety  -    increase venlafaxine (EFFEXOR-XR) 75 MG 24 hr capsule; Take 1 capsule (75 mg total) by mouth once daily.  Dispense: 90 capsule; Refill: 1    Vitamin D deficiency  Cont otc supplement    Vitamin B 12 deficiency  Cont otc supplement    Other migraine without status migrainosus, not intractable  Improved        Follow up in about 6 months (around 2/23/2024) for anxiety/depression .      8/23/2023 Cristian Abdalla, TOBIN, FNP

## 2023-08-23 ENCOUNTER — OFFICE VISIT (OUTPATIENT)
Dept: FAMILY MEDICINE | Facility: CLINIC | Age: 33
End: 2023-08-23
Payer: COMMERCIAL

## 2023-08-23 VITALS
DIASTOLIC BLOOD PRESSURE: 80 MMHG | HEIGHT: 70 IN | SYSTOLIC BLOOD PRESSURE: 110 MMHG | OXYGEN SATURATION: 97 % | HEART RATE: 90 BPM | BODY MASS INDEX: 36.94 KG/M2 | WEIGHT: 258 LBS | TEMPERATURE: 98 F

## 2023-08-23 DIAGNOSIS — E53.8 VITAMIN B 12 DEFICIENCY: ICD-10-CM

## 2023-08-23 DIAGNOSIS — F41.8 DEPRESSION WITH ANXIETY: Primary | ICD-10-CM

## 2023-08-23 DIAGNOSIS — G43.809 OTHER MIGRAINE WITHOUT STATUS MIGRAINOSUS, NOT INTRACTABLE: ICD-10-CM

## 2023-08-23 DIAGNOSIS — E55.9 VITAMIN D DEFICIENCY: ICD-10-CM

## 2023-08-23 PROCEDURE — 3079F DIAST BP 80-89 MM HG: CPT | Mod: CPTII,S$GLB,, | Performed by: NURSE PRACTITIONER

## 2023-08-23 PROCEDURE — 3074F SYST BP LT 130 MM HG: CPT | Mod: CPTII,S$GLB,, | Performed by: NURSE PRACTITIONER

## 2023-08-23 PROCEDURE — 3044F PR MOST RECENT HEMOGLOBIN A1C LEVEL <7.0%: ICD-10-PCS | Mod: CPTII,S$GLB,, | Performed by: NURSE PRACTITIONER

## 2023-08-23 PROCEDURE — 1160F RVW MEDS BY RX/DR IN RCRD: CPT | Mod: CPTII,S$GLB,, | Performed by: NURSE PRACTITIONER

## 2023-08-23 PROCEDURE — 99214 PR OFFICE/OUTPT VISIT, EST, LEVL IV, 30-39 MIN: ICD-10-PCS | Mod: S$GLB,,, | Performed by: NURSE PRACTITIONER

## 2023-08-23 PROCEDURE — 3008F BODY MASS INDEX DOCD: CPT | Mod: CPTII,S$GLB,, | Performed by: NURSE PRACTITIONER

## 2023-08-23 PROCEDURE — 1159F PR MEDICATION LIST DOCUMENTED IN MEDICAL RECORD: ICD-10-PCS | Mod: CPTII,S$GLB,, | Performed by: NURSE PRACTITIONER

## 2023-08-23 PROCEDURE — 3044F HG A1C LEVEL LT 7.0%: CPT | Mod: CPTII,S$GLB,, | Performed by: NURSE PRACTITIONER

## 2023-08-23 PROCEDURE — 3079F PR MOST RECENT DIASTOLIC BLOOD PRESSURE 80-89 MM HG: ICD-10-PCS | Mod: CPTII,S$GLB,, | Performed by: NURSE PRACTITIONER

## 2023-08-23 PROCEDURE — 1159F MED LIST DOCD IN RCRD: CPT | Mod: CPTII,S$GLB,, | Performed by: NURSE PRACTITIONER

## 2023-08-23 PROCEDURE — 3008F PR BODY MASS INDEX (BMI) DOCUMENTED: ICD-10-PCS | Mod: CPTII,S$GLB,, | Performed by: NURSE PRACTITIONER

## 2023-08-23 PROCEDURE — 1160F PR REVIEW ALL MEDS BY PRESCRIBER/CLIN PHARMACIST DOCUMENTED: ICD-10-PCS | Mod: CPTII,S$GLB,, | Performed by: NURSE PRACTITIONER

## 2023-08-23 PROCEDURE — 99214 OFFICE O/P EST MOD 30 MIN: CPT | Mod: S$GLB,,, | Performed by: NURSE PRACTITIONER

## 2023-08-23 PROCEDURE — 3074F PR MOST RECENT SYSTOLIC BLOOD PRESSURE < 130 MM HG: ICD-10-PCS | Mod: CPTII,S$GLB,, | Performed by: NURSE PRACTITIONER

## 2023-08-23 RX ORDER — VENLAFAXINE HYDROCHLORIDE 75 MG/1
75 CAPSULE, EXTENDED RELEASE ORAL DAILY
Qty: 90 CAPSULE | Refills: 1 | Status: SHIPPED | OUTPATIENT
Start: 2023-08-23 | End: 2024-02-16

## 2023-09-22 ENCOUNTER — CLINICAL SUPPORT (OUTPATIENT)
Dept: OTHER | Facility: CLINIC | Age: 33
End: 2023-09-22
Payer: COMMERCIAL

## 2023-09-22 DIAGNOSIS — Z00.8 ENCOUNTER FOR OTHER GENERAL EXAMINATION: ICD-10-CM

## 2023-09-23 VITALS
DIASTOLIC BLOOD PRESSURE: 84 MMHG | HEIGHT: 69 IN | BODY MASS INDEX: 38.36 KG/M2 | SYSTOLIC BLOOD PRESSURE: 129 MMHG | WEIGHT: 259 LBS

## 2023-09-23 LAB
HDLC SERPL-MCNC: 41 MG/DL
POC CHOLESTEROL, LDL (DOCK): 152 MG/DL
POC CHOLESTEROL, TOTAL: 218 MG/DL
POC GLUCOSE, FASTING: 100 MG/DL (ref 60–110)
TRIGL SERPL-MCNC: 140 MG/DL

## 2023-10-16 ENCOUNTER — OFFICE VISIT (OUTPATIENT)
Dept: URGENT CARE | Facility: CLINIC | Age: 33
End: 2023-10-16
Payer: COMMERCIAL

## 2023-10-16 VITALS
TEMPERATURE: 98 F | OXYGEN SATURATION: 98 % | SYSTOLIC BLOOD PRESSURE: 143 MMHG | BODY MASS INDEX: 37.37 KG/M2 | HEART RATE: 99 BPM | DIASTOLIC BLOOD PRESSURE: 90 MMHG | HEIGHT: 70 IN | WEIGHT: 261 LBS | RESPIRATION RATE: 16 BRPM

## 2023-10-16 DIAGNOSIS — T78.40XA ALLERGIC REACTION, INITIAL ENCOUNTER: Primary | ICD-10-CM

## 2023-10-16 DIAGNOSIS — L29.9 PRURITUS: ICD-10-CM

## 2023-10-16 DIAGNOSIS — L50.9 HIVES: ICD-10-CM

## 2023-10-16 DIAGNOSIS — M79.89 FINGER SWELLING: ICD-10-CM

## 2023-10-16 DIAGNOSIS — R22.0 LIP SWELLING: ICD-10-CM

## 2023-10-16 PROCEDURE — 96372 PR INJECTION,THERAP/PROPH/DIAG2ST, IM OR SUBCUT: ICD-10-PCS | Mod: S$GLB,,,

## 2023-10-16 PROCEDURE — 99214 OFFICE O/P EST MOD 30 MIN: CPT | Mod: 25,S$GLB,,

## 2023-10-16 PROCEDURE — 96372 THER/PROPH/DIAG INJ SC/IM: CPT | Mod: S$GLB,,,

## 2023-10-16 PROCEDURE — 99214 PR OFFICE/OUTPT VISIT, EST, LEVL IV, 30-39 MIN: ICD-10-PCS | Mod: 25,S$GLB,,

## 2023-10-16 RX ORDER — FAMOTIDINE 20 MG/1
20 TABLET, FILM COATED ORAL
Status: COMPLETED | OUTPATIENT
Start: 2023-10-16 | End: 2023-10-16

## 2023-10-16 RX ORDER — DIPHENHYDRAMINE HYDROCHLORIDE 50 MG/ML
50 INJECTION INTRAMUSCULAR; INTRAVENOUS
Status: COMPLETED | OUTPATIENT
Start: 2023-10-16 | End: 2023-10-16

## 2023-10-16 RX ORDER — METHYLPREDNISOLONE 4 MG/1
TABLET ORAL
Qty: 21 EACH | Refills: 0 | Status: SHIPPED | OUTPATIENT
Start: 2023-10-16 | End: 2023-11-06

## 2023-10-16 RX ORDER — FAMOTIDINE 20 MG/1
20 TABLET, FILM COATED ORAL 2 TIMES DAILY
Qty: 60 TABLET | Refills: 0 | Status: SHIPPED | OUTPATIENT
Start: 2023-10-16 | End: 2024-01-31 | Stop reason: SDUPTHER

## 2023-10-16 RX ORDER — CETIRIZINE HYDROCHLORIDE 10 MG/1
10 TABLET ORAL DAILY
Qty: 30 TABLET | Refills: 0 | Status: SHIPPED | OUTPATIENT
Start: 2023-10-16 | End: 2024-03-01

## 2023-10-16 RX ORDER — DIPHENHYDRAMINE HCL 25 MG
50 CAPSULE ORAL
Status: DISCONTINUED | OUTPATIENT
Start: 2023-10-16 | End: 2023-10-16

## 2023-10-16 RX ORDER — DEXAMETHASONE SODIUM PHOSPHATE 4 MG/ML
8 INJECTION, SOLUTION INTRA-ARTICULAR; INTRALESIONAL; INTRAMUSCULAR; INTRAVENOUS; SOFT TISSUE
Status: COMPLETED | OUTPATIENT
Start: 2023-10-16 | End: 2023-10-16

## 2023-10-16 RX ADMIN — DEXAMETHASONE SODIUM PHOSPHATE 8 MG: 4 INJECTION, SOLUTION INTRA-ARTICULAR; INTRALESIONAL; INTRAMUSCULAR; INTRAVENOUS; SOFT TISSUE at 08:10

## 2023-10-16 RX ADMIN — DIPHENHYDRAMINE HYDROCHLORIDE 50 MG: 50 INJECTION INTRAMUSCULAR; INTRAVENOUS at 08:10

## 2023-10-16 RX ADMIN — FAMOTIDINE 20 MG: 20 TABLET, FILM COATED ORAL at 08:10

## 2023-10-16 NOTE — PATIENT INSTRUCTIONS
Start steroid tomorrow  Start Pepcid tonight  Start Zyrtec today     Use Benadryl as needed over-the-counter as directed    Red flags for going to the ER including feelings of throat swelling, further swelling of the mouth or lips, or difficulty breathing.

## 2023-10-16 NOTE — PROGRESS NOTES
"Subjective:      Patient ID: Swapnil Dillard is a 32 y.o. male.    Vitals:  height is 5' 10" (1.778 m) and weight is 118.4 kg (261 lb). His oral temperature is 97.5 °F (36.4 °C). His blood pressure is 143/90 (abnormal) and his pulse is 99. His respiration is 16 and oxygen saturation is 98%.     Chief Complaint: Allergic Reaction    Mr. Dillard states when he woke up this morning his left thumb and left pinky were swollen and itchy.  He states shortly after waking up he realized his lower lip was starting to swell as well.  Denies further mouth or throat swelling and denies itching in his throat.  Since arriving here he started to notice several red welts that are itchy all over his body primarily on his arms.  Patient has not taken any medication prior to arrival. Denies changing soaps, lotions, foods, or anything that is a known allergen.  Discussed with patient he needs to discuss with his wife to make sure she has not change anything recently that he may have come in contact with.     Allergic Reaction  Pertinent negatives include no coughing or wheezing.       Constitution: Negative. Negative for chills, fatigue and fever.   HENT:  Positive for facial swelling (Lower lip).    Neck: neck negative. Negative for neck swelling.   Cardiovascular: Negative.    Eyes: Negative.  Negative for eyelid swelling.   Respiratory:  Negative for chest tightness, cough, shortness of breath and wheezing.    Gastrointestinal: Negative.    Genitourinary: Negative.    Musculoskeletal: Negative.    Skin: Negative.  Negative for erythema.   Allergic/Immunologic: Positive for itching.   Neurological: Negative.    Hematologic/Lymphatic: Negative.       Objective:     Physical Exam   Constitutional: He is oriented to person, place, and time. He appears well-developed. normal  HENT:   Head: Normocephalic and atraumatic. Head is without abrasion, without contusion and without laceration.   Ears:   Right Ear: External ear normal.   Left Ear: " External ear normal.   Nose: Nose normal. No rhinorrhea or congestion.   Mouth/Throat: Oropharynx is clear and moist and mucous membranes are normal.   Eyes: Conjunctivae, EOM and lids are normal. Pupils are equal, round, and reactive to light.   Neck: Trachea normal and phonation normal. Neck supple.   Cardiovascular: Normal rate and regular rhythm.   Pulmonary/Chest: Effort normal and breath sounds normal. No respiratory distress. He has no wheezes.   Abdominal: Normal appearance.   Musculoskeletal: Normal range of motion.         General: Swelling (Left pinky and thumb and lower lip) present. Normal range of motion.   Neurological: He is alert and oriented to person, place, and time.   Skin: Skin is warm, dry, intact and no rash. Capillary refill takes less than 2 seconds. No abrasion, No burn, No bruising, No erythema and No ecchymosis   Psychiatric: His speech is normal and behavior is normal. Mood, judgment and thought content normal.   Nursing note and vitals reviewed.      Assessment:     1. Allergic reaction, initial encounter    2. Lip swelling    3. Finger swelling    4. Hives    5. Pruritus        Plan:       Allergic reaction, initial encounter  -     Discontinue: diphenhydrAMINE capsule 50 mg  -     dexAMETHasone injection 8 mg  -     famotidine tablet 20 mg  -     famotidine (PEPCID) 20 MG tablet; Take 1 tablet (20 mg total) by mouth 2 (two) times daily.  Dispense: 60 tablet; Refill: 0  -     cetirizine (ZYRTEC) 10 MG tablet; Take 1 tablet (10 mg total) by mouth once daily.  Dispense: 30 tablet; Refill: 0  -     diphenhydrAMINE injection 50 mg  -     methylPREDNISolone (MEDROL DOSEPACK) 4 mg tablet; use as directed  Dispense: 21 each; Refill: 0    Lip swelling  -     dexAMETHasone injection 8 mg  -     famotidine tablet 20 mg  -     famotidine (PEPCID) 20 MG tablet; Take 1 tablet (20 mg total) by mouth 2 (two) times daily.  Dispense: 60 tablet; Refill: 0  -     diphenhydrAMINE injection 50  mg    Finger swelling  -     dexAMETHasone injection 8 mg  -     famotidine tablet 20 mg  -     famotidine (PEPCID) 20 MG tablet; Take 1 tablet (20 mg total) by mouth 2 (two) times daily.  Dispense: 60 tablet; Refill: 0  -     diphenhydrAMINE injection 50 mg    Hives  -     dexAMETHasone injection 8 mg  -     famotidine tablet 20 mg  -     famotidine (PEPCID) 20 MG tablet; Take 1 tablet (20 mg total) by mouth 2 (two) times daily.  Dispense: 60 tablet; Refill: 0  -     cetirizine (ZYRTEC) 10 MG tablet; Take 1 tablet (10 mg total) by mouth once daily.  Dispense: 30 tablet; Refill: 0  -     diphenhydrAMINE injection 50 mg    Pruritus  -     Discontinue: diphenhydrAMINE capsule 50 mg  -     famotidine tablet 20 mg  -     famotidine (PEPCID) 20 MG tablet; Take 1 tablet (20 mg total) by mouth 2 (two) times daily.  Dispense: 60 tablet; Refill: 0  -     diphenhydrAMINE injection 50 mg      Discussed medication management with patient.  Start steroid tomorrow  Start Pepcid tonight  Start Zyrtec today    Use Benadryl as needed over-the-counter as directed    Discussed red flags for going to the ER including feelings of throat swelling, further swelling of the mouth or lips, or difficulty breathing.

## 2023-10-16 NOTE — LETTER
October 16, 2023      [facility]  [address]  [facility] [address]  Phone: [phone]       Patient: Swapnil Dillard   YOB: 1990  Date of Visit: 10/16/2023    To Whom It May Concern:    Sam Dillard  was at [facility] on 10/16/2023. The patient may return to work on October 17, 2023 with no restrictions. If you have any questions or concerns, or if I can be of further assistance, please do not hesitate to contact me.    Sincerely,    Leny Valentino NP

## 2023-10-16 NOTE — PROGRESS NOTES
"Subjective:      Patient ID: Swapnil Dillard is a 32 y.o. male.    Vitals:  height is 5' 10" (1.778 m) and weight is 118.4 kg (261 lb). His respiration is 16.     Chief Complaint: Allergic Reaction    Possible allergic reaction.  Patient woke up this morning with swollen left pinky, thumb, lip.  Patient also has red whelps appearing all over.  All areas are itching.   Patient is unaware of what could have caused reaction.     Allergic Reaction  This is a new problem. The current episode started today. It is unknown what he was exposed to. Swelling is present on the hands and lips.   ROS   Objective:     Physical Exam    Assessment:     No diagnosis found.    Plan:       There are no diagnoses linked to this encounter.                "

## 2023-11-01 NOTE — PROGRESS NOTES
ALLERGY & IMMUNOLOGY CLINIC -  NEW PATIENT     HISTORY OF PRESENT ILLNESS     Patient ID: Swapnil Dillard is a 33 y.o. male    CC:   Chief Complaint   Patient presents with    Urticaria     Also hand swollen       HPI: Swapnil Dillard is a 33 y.o. male presents for evaluation of:    11/02/2023  Hives: Recalls two weeks ago, he awakened with swollen hands, angioedema and diffuse urticaria. Presented to Urgent care and prescribed steroids and oral antihistamines with moderate relief of symptoms. Several days later, symptoms progressively worsened despite taking Diphenhydramine, Zytrec and Pepcid and re-presented to ER (5 days after s/s started). Received more benadryl and steroids with improvement in swelling, not hives. Feels like symptoms wax and wane throughout the day. Heat/hot showers do not worsen symptoms, Aspirin/NSAIDs do not worsen symptoms, stress does not worsen symptoms.  Has tried avoiding chin strap used for CPAP and avoiding latex with little improvement in symptoms. Previously allergy tested +HDM and cats.     Denies asthma and wheezing, nausea, vomiting and diarrhea.      REVIEW OF SYSTEMS     CONST: no F/C/NS, no unintentional weight changes  Balance of review of systems negative except as mentioned above     MEDICAL HISTORY     MedHx: active problems reviewed  SurgHx:   Past Surgical History:   Procedure Laterality Date    EPIDURAL STEROID INJECTION INTO THORACIC SPINE N/A 3/24/2023    Procedure: INJECTION, STEROID, SPINE, THORACIC, EPIDURAL;  Surgeon: Scooter Norris MD;  [facility]: [facility];  Service: Pain Management;  Laterality: N/A;  T7-T8  Dr Sharp    LAPAROSCOPIC NISSEN FUNDOPLICATION         SocHx:   -Denies smoking history and illicit drug use   -Pets: 2 dogs  -School/Work: Works for [employer]    FamHx:   Mother with food allergy   Otherwise no Family History of asthma, allergic rhinitis, atopic dermatitis, drug allergy, food allergy, venom allergy or immune deficiency.     Allergies:  "see below  Medications: MAR reviewed       PHYSICAL EXAM     VS: Ht 5' 10" (1.778 m)   Wt 117.4 kg (258 lb 13.1 oz)   BMI 37.14 kg/m²   GENERAL: awake, alert, cooperative with exam  EYES: PERRL, EOMI, no conjunctival injection, no discharge, no infraorbital shiners  EXTREMITIES: +2 distal pulses, no c/c/e  DERM: Scattered urticaria to bilateral upper extremities     ASSESSMENT/PLAN     Swapnil Dillard is a 33 y.o. male with       1. Acute urticaria  -Approximately three weeks of diffuse urticaria and associated angioedema despite diphenhydramine, cetirizine and famotidine. NO identifiable triggers and lack of multi-system involvement. Possibly post-viral vs idiopathic in etiology. Recommend initiation of cetirizine 20mg BID and Famotidine 20mg BID as well. Will return in 4 weeks to discuss omalizumab      Follow up: 4 weeks-virtual      Ronnie Duong MD    I spent a total of 30 minutes on the day of the visit. This includes face to face time and non-face to face time preparing to see the patient (eg, review of tests), obtaining and/or reviewing separately obtained history, documenting clinical information in the electronic or other health record, independently interpreting results and communicating results to the patient/family/caregiver, or care coordinator.      "

## 2023-11-02 ENCOUNTER — OFFICE VISIT (OUTPATIENT)
Dept: ALLERGY | Facility: CLINIC | Age: 33
End: 2023-11-02
Payer: COMMERCIAL

## 2023-11-02 VITALS — WEIGHT: 258.81 LBS | HEIGHT: 70 IN | BODY MASS INDEX: 37.05 KG/M2

## 2023-11-02 DIAGNOSIS — L50.8 ACUTE URTICARIA: Primary | ICD-10-CM

## 2023-11-02 PROCEDURE — 1159F MED LIST DOCD IN RCRD: CPT | Mod: CPTII,S$GLB,, | Performed by: STUDENT IN AN ORGANIZED HEALTH CARE EDUCATION/TRAINING PROGRAM

## 2023-11-02 PROCEDURE — 99204 PR OFFICE/OUTPT VISIT, NEW, LEVL IV, 45-59 MIN: ICD-10-PCS | Mod: S$GLB,,, | Performed by: STUDENT IN AN ORGANIZED HEALTH CARE EDUCATION/TRAINING PROGRAM

## 2023-11-02 PROCEDURE — 99204 OFFICE O/P NEW MOD 45 MIN: CPT | Mod: S$GLB,,, | Performed by: STUDENT IN AN ORGANIZED HEALTH CARE EDUCATION/TRAINING PROGRAM

## 2023-11-02 PROCEDURE — 3008F PR BODY MASS INDEX (BMI) DOCUMENTED: ICD-10-PCS | Mod: CPTII,S$GLB,, | Performed by: STUDENT IN AN ORGANIZED HEALTH CARE EDUCATION/TRAINING PROGRAM

## 2023-11-02 PROCEDURE — 99999 PR PBB SHADOW E&M-EST. PATIENT-LVL III: ICD-10-PCS | Mod: PBBFAC,,, | Performed by: STUDENT IN AN ORGANIZED HEALTH CARE EDUCATION/TRAINING PROGRAM

## 2023-11-02 PROCEDURE — 1159F PR MEDICATION LIST DOCUMENTED IN MEDICAL RECORD: ICD-10-PCS | Mod: CPTII,S$GLB,, | Performed by: STUDENT IN AN ORGANIZED HEALTH CARE EDUCATION/TRAINING PROGRAM

## 2023-11-02 PROCEDURE — 3044F HG A1C LEVEL LT 7.0%: CPT | Mod: CPTII,S$GLB,, | Performed by: STUDENT IN AN ORGANIZED HEALTH CARE EDUCATION/TRAINING PROGRAM

## 2023-11-02 PROCEDURE — 3008F BODY MASS INDEX DOCD: CPT | Mod: CPTII,S$GLB,, | Performed by: STUDENT IN AN ORGANIZED HEALTH CARE EDUCATION/TRAINING PROGRAM

## 2023-11-02 PROCEDURE — 3044F PR MOST RECENT HEMOGLOBIN A1C LEVEL <7.0%: ICD-10-PCS | Mod: CPTII,S$GLB,, | Performed by: STUDENT IN AN ORGANIZED HEALTH CARE EDUCATION/TRAINING PROGRAM

## 2023-11-02 PROCEDURE — 99999 PR PBB SHADOW E&M-EST. PATIENT-LVL III: CPT | Mod: PBBFAC,,, | Performed by: STUDENT IN AN ORGANIZED HEALTH CARE EDUCATION/TRAINING PROGRAM

## 2023-11-02 RX ORDER — CHOLECALCIFEROL (VITAMIN D3) 25 MCG
1000 TABLET ORAL
COMMUNITY

## 2023-11-02 RX ORDER — EPINEPHRINE 0.15 MG/.3ML
INJECTION INTRAMUSCULAR
COMMUNITY
Start: 2023-10-21

## 2023-11-02 RX ORDER — LORATADINE 10 MG/1
10 TABLET ORAL
COMMUNITY
Start: 2023-10-21 | End: 2024-03-01

## 2023-11-02 RX ORDER — LANOLIN ALCOHOL/MO/W.PET/CERES
1000 CREAM (GRAM) TOPICAL
COMMUNITY

## 2023-11-02 NOTE — PATIENT INSTRUCTIONS
Chronic idiopathic urticaria is a condition in which the body creates hives for unknown reasons. We do not know exactly why the body makes these hives, but most patients can manage medications on their own, increasing and deceasing the medications over time. There are two types of medications that are used:  H1-blockers are typical antihistmaines: Zyrtec (cetirizine), Claritin (loratadine), and Allegra (fexofenadine) are all good choices.  H-2 blockers are another type of antihistmaine, often used for stomach problems: Zantac (ranitidine) and Pepcid (famotidine) are good choices.    When there is a significant flare of hives, use:   H-1 blocker twice daily (Cetirizine 20mg)    AND   H-2 blocker twice daily    In one week, you can attempt to wean to:   H-1 blocker twice daily    In one week, you can attempt to wean to:      H-1 blocker once daily    In one week, you can attempt to wean to:   No medication    Many patients require long-term treatment at one of these stages. If you attempt to wean medications and the hives become bothersome, return to the last tolerated medication schedule. If you are still having hives even though you are on both H-1 and H-2 blockers, return to the clinic for additional evaluation (you will probably need blood drawn) and consideration of prescription medications.

## 2023-11-29 ENCOUNTER — OFFICE VISIT (OUTPATIENT)
Dept: ALLERGY | Facility: CLINIC | Age: 33
End: 2023-11-29
Payer: COMMERCIAL

## 2023-11-29 DIAGNOSIS — L50.8 ACUTE URTICARIA: Primary | ICD-10-CM

## 2023-11-29 PROCEDURE — 99213 PR OFFICE/OUTPT VISIT, EST, LEVL III, 20-29 MIN: ICD-10-PCS | Mod: 95,,, | Performed by: STUDENT IN AN ORGANIZED HEALTH CARE EDUCATION/TRAINING PROGRAM

## 2023-11-29 PROCEDURE — 3044F PR MOST RECENT HEMOGLOBIN A1C LEVEL <7.0%: ICD-10-PCS | Mod: CPTII,95,, | Performed by: STUDENT IN AN ORGANIZED HEALTH CARE EDUCATION/TRAINING PROGRAM

## 2023-11-29 PROCEDURE — 3044F HG A1C LEVEL LT 7.0%: CPT | Mod: CPTII,95,, | Performed by: STUDENT IN AN ORGANIZED HEALTH CARE EDUCATION/TRAINING PROGRAM

## 2023-11-29 PROCEDURE — 99213 OFFICE O/P EST LOW 20 MIN: CPT | Mod: 95,,, | Performed by: STUDENT IN AN ORGANIZED HEALTH CARE EDUCATION/TRAINING PROGRAM

## 2023-11-29 NOTE — PROGRESS NOTES
The patient location is: [place], [place]  The chief complaint leading to consultation is: hives  Visit type: audiovisual     Face to Face time with patient: 10 minutes  20 minutes of total time spent on the encounter, which includes face to face time and non-face to face time preparing to see the patient (eg, review of tests), Obtaining and/or reviewing separately obtained history, Documenting clinical information in the electronic or other health record, Independently interpreting results (not separately reported) and communicating results to the patient/family/caregiver, or Care coordination (not separately reported).      Each patient to whom he or she provides medical services by telemedicine is:  (1) informed of the relationship between the physician and patient and the respective role of any other health care provider with respect to management of the patient; and (2) notified that he or she may decline to receive medical services by telemedicine and may withdraw from such care at any time.    ALLERGY & IMMUNOLOGY CLINIC -  Established Patient     HISTORY OF PRESENT ILLNESS     Patient ID: Swapnil Dillard is a 33 y.o. male    CC: follow up visit    HPI: Swapnil Dillard is a 33 y.o. male presents for evaluation of:    Office Visit 11/29/2023  Doing very well since last visit. States he had some skin peeling 2-3 weeks ago which has since resolved. Discontinued Cetirizine and Famotidine 1 week ago and no return of hives    11/02/2023  Hives: Recalls two weeks ago, he awakened with swollen hands, angioedema and diffuse urticaria. Presented to Urgent care and prescribed steroids and oral antihistamines with moderate relief of symptoms. Several days later, symptoms progressively worsened despite taking Diphenhydramine, Zytrec and Pepcid and re-presented to ER (5 days after s/s started). Received more benadryl and steroids with improvement in swelling, not hives. Feels like symptoms wax and wane throughout the day.  Heat/hot showers do not worsen symptoms, Aspirin/NSAIDs do not worsen symptoms, stress does not worsen symptoms.  Has tried avoiding chin strap used for CPAP and avoiding latex with little improvement in symptoms. Previously allergy tested +HDM and cats.      Denies asthma and wheezing, nausea, vomiting and diarrhea.      REVIEW OF SYSTEMS     CONST: no F/C/NS, no unintentional weight changes  Balance of review of systems negative except as mentioned above     MEDICAL HISTORY     MedHx: active problems reviewed  SurgHx:   Past Surgical History:   Procedure Laterality Date    EPIDURAL STEROID INJECTION INTO THORACIC SPINE N/A 3/24/2023    Procedure: INJECTION, STEROID, SPINE, THORACIC, EPIDURAL;  Surgeon: Scooter Norris MD;  Location: [facility] OR;  Service: Pain Management;  Laterality: N/A;  T7-T8  Dr Sharp    LAPAROSCOPIC NISSEN FUNDOPLICATION       Allergies: see below  Medications: MAR reviewed    No pertinent allergy changes in medical history since last visit     PHYSICAL EXAM     Virtual Visit-Patient appears well and in no distress     ASSESSMENT/PLAN     Swapnil Dillard is a 33 y.o. male with     1. Acute urticaria  -Now resolved without oral H1 and H2 blockade. Possibly related to previous viral infection given skin peeling and now resolved cough following urticaria. Recommend to notify me if re-development of urticaria or any other concerns        Follow up: As Needed      Ronnie Duong MD    I spent a total of 20 minutes on the day of the visit. This includes face to face time and non-face to face time preparing to see the patient (eg, review of tests), obtaining and/or reviewing separately obtained history, documenting clinical information in the electronic or other health record, independently interpreting results and communicating results to the patient/family/caregiver, or care coordinator.

## 2023-12-04 ENCOUNTER — OFFICE VISIT (OUTPATIENT)
Dept: URGENT CARE | Facility: CLINIC | Age: 33
End: 2023-12-04
Payer: COMMERCIAL

## 2023-12-04 VITALS
WEIGHT: 258 LBS | SYSTOLIC BLOOD PRESSURE: 123 MMHG | HEART RATE: 113 BPM | BODY MASS INDEX: 36.94 KG/M2 | OXYGEN SATURATION: 95 % | RESPIRATION RATE: 16 BRPM | DIASTOLIC BLOOD PRESSURE: 80 MMHG | TEMPERATURE: 102 F | HEIGHT: 70 IN

## 2023-12-04 DIAGNOSIS — R05.9 COUGH, UNSPECIFIED TYPE: ICD-10-CM

## 2023-12-04 DIAGNOSIS — Z20.822 COVID-19 VIRUS NOT DETECTED: ICD-10-CM

## 2023-12-04 DIAGNOSIS — J10.1 INFLUENZA A: Primary | ICD-10-CM

## 2023-12-04 LAB
CTP QC/QA: YES
CTP QC/QA: YES
FLUAV AG NPH QL: POSITIVE
FLUBV AG NPH QL: NEGATIVE
SARS-COV-2 AG RESP QL IA.RAPID: NEGATIVE

## 2023-12-04 PROCEDURE — 87804 POCT INFLUENZA A/B: ICD-10-PCS | Mod: QW,,,

## 2023-12-04 PROCEDURE — 99214 PR OFFICE/OUTPT VISIT, EST, LEVL IV, 30-39 MIN: ICD-10-PCS | Mod: S$GLB,,,

## 2023-12-04 PROCEDURE — 87811 SARS CORONAVIRUS 2 ANTIGEN POCT, MANUAL READ: ICD-10-PCS | Mod: QW,S$GLB,,

## 2023-12-04 PROCEDURE — 87804 INFLUENZA ASSAY W/OPTIC: CPT | Mod: QW,,,

## 2023-12-04 PROCEDURE — 87811 SARS-COV-2 COVID19 W/OPTIC: CPT | Mod: QW,S$GLB,,

## 2023-12-04 PROCEDURE — 99214 OFFICE O/P EST MOD 30 MIN: CPT | Mod: S$GLB,,,

## 2023-12-04 RX ORDER — IBUPROFEN 200 MG
600 TABLET ORAL
Status: COMPLETED | OUTPATIENT
Start: 2023-12-04 | End: 2023-12-04

## 2023-12-04 RX ORDER — BALOXAVIR MARBOXIL 80 MG/1
80 TABLET, FILM COATED ORAL ONCE
Qty: 1 TABLET | Refills: 0 | Status: SHIPPED | OUTPATIENT
Start: 2023-12-04 | End: 2023-12-04

## 2023-12-04 RX ADMIN — Medication 600 MG: at 08:12

## 2023-12-04 NOTE — PROGRESS NOTES
"Subjective:      Patient ID: Swapnil Dillard is a 33 y.o. male.    Vitals:  height is 5' 10" (1.778 m) and weight is 117 kg (258 lb). His oral temperature is 101.5 °F (38.6 °C) (abnormal). His blood pressure is 123/80 and his pulse is 113 (abnormal). His respiration is 16 and oxygen saturation is 95%.     Chief Complaint: Richmond Kraft, presents to clinic with a chief complaint of subjective fever, headache, sore throat, dry cough, and body aches since Saturday morning.  No measured fevers.  Has been taking DayQuil, and NyQuil for symptoms.  Has also been using honey.  Patient is influenza vaccinated.  No sick contacts within household.    Cough  This is a new problem. The current episode started in the past 7 days. Associated symptoms include a fever, headaches, myalgias, nasal congestion and a sore throat. Pertinent negatives include no postnasal drip or shortness of breath. Treatments tried: dayquil, nyquil.       Constitution: Positive for fever.   HENT:  Positive for sore throat. Negative for postnasal drip.    Neck: neck negative.   Cardiovascular: Negative.    Eyes: Negative.    Respiratory:  Positive for cough. Negative for shortness of breath.    Gastrointestinal:  Negative for nausea, vomiting and diarrhea.   Endocrine: negative.   Genitourinary: Negative.    Musculoskeletal:  Positive for muscle ache.   Skin: Negative.    Allergic/Immunologic: Positive for immunizations up-to-date and flu shot.   Neurological:  Positive for headaches.   Hematologic/Lymphatic: Negative.    Psychiatric/Behavioral: Negative.        Objective:     Physical Exam   Constitutional: He is oriented to person, place, and time. He appears well-developed. He is cooperative.  Non-toxic appearance. He does not appear ill. No distress. obesity  HENT:   Head: Normocephalic and atraumatic.   Ears:   Right Ear: Hearing, tympanic membrane, external ear and ear canal normal.   Left Ear: Hearing, tympanic membrane, external ear and ear canal " normal.   Nose: Congestion present. No mucosal edema or nasal deformity. No epistaxis. Right sinus exhibits no maxillary sinus tenderness and no frontal sinus tenderness. Left sinus exhibits no maxillary sinus tenderness and no frontal sinus tenderness.   Mouth/Throat: Uvula is midline and mucous membranes are normal. Mucous membranes are moist. No trismus in the jaw. Normal dentition. No uvula swelling. Posterior oropharyngeal erythema present. No oropharyngeal exudate. Oropharynx is clear.   Eyes: Conjunctivae and lids are normal. Pupils are equal, round, and reactive to light. Extraocular movement intact   Neck: Trachea normal and phonation normal. Neck supple.   Cardiovascular: Regular rhythm, normal heart sounds and normal pulses. Tachycardia present.   Pulmonary/Chest: Effort normal and breath sounds normal.   Abdominal: Normal appearance. Soft. flat abdomen There is no abdominal tenderness. There is no left CVA tenderness and no right CVA tenderness.   Musculoskeletal: Normal range of motion.         General: Normal range of motion.   Neurological: no focal deficit. He is alert, oriented to person, place, and time and at baseline. He exhibits normal muscle tone.   Skin: Skin is warm, dry, intact and not diaphoretic. Capillary refill takes 2 to 3 seconds.   Psychiatric: His speech is normal and behavior is normal. Mood, judgment and thought content normal.   Nursing note and vitals reviewed.      Assessment:     1. Influenza A    2. Cough, unspecified type        Plan:       Influenza A  -     ibuprofen tablet 600 mg  -     baloxavir marboxiL (XOFLUZA) 80 mg tablet; Take 1 tablet (80 mg total) by mouth once. for 1 dose  Dispense: 1 tablet; Refill: 0    Cough, unspecified type  -     SARS Coronavirus 2 Antigen, POCT Manual Read  -     POCT Influenza A/B Rapid Antigen

## 2023-12-04 NOTE — LETTER
December 4, 2023      [facility]  [address]  [facility] [address]  Phone: [phone]       Patient: Swapnil Dillard   YOB: 1990  Date of Visit: 12/04/2023    To Whom It May Concern:    Sam Dillard  was at [facility] on 12/04/2023. The patient may return to work/school on 12/8/23 with no restrictions. If you have any questions or concerns, or if I can be of further assistance, please do not hesitate to contact me.  May return sooner if 24 hours fever and symptom-free  Sincerely,    ROMY Ortiz

## 2023-12-04 NOTE — PATIENT INSTRUCTIONS
Begin Tamiflu immediately  Honey for cough  Continue using DayQuil NyQuil  Alternate between Motrin and Tylenol for body aches fever, and headache

## 2024-01-31 ENCOUNTER — HOSPITAL ENCOUNTER (OUTPATIENT)
Dept: RADIOLOGY | Facility: HOSPITAL | Age: 34
Discharge: HOME | End: 2024-01-31
Attending: NURSE PRACTITIONER
Payer: COMMERCIAL

## 2024-01-31 ENCOUNTER — OFFICE VISIT (OUTPATIENT)
Dept: FAMILY MEDICINE | Facility: CLINIC | Age: 34
End: 2024-01-31
Payer: COMMERCIAL

## 2024-01-31 VITALS
WEIGHT: 261 LBS | HEIGHT: 70 IN | BODY MASS INDEX: 37.37 KG/M2 | SYSTOLIC BLOOD PRESSURE: 130 MMHG | OXYGEN SATURATION: 97 % | HEART RATE: 91 BPM | DIASTOLIC BLOOD PRESSURE: 86 MMHG

## 2024-01-31 DIAGNOSIS — K21.9 GASTROESOPHAGEAL REFLUX DISEASE, UNSPECIFIED WHETHER ESOPHAGITIS PRESENT: ICD-10-CM

## 2024-01-31 DIAGNOSIS — R05.1 ACUTE COUGH: Primary | ICD-10-CM

## 2024-01-31 DIAGNOSIS — R05.1 ACUTE COUGH: ICD-10-CM

## 2024-01-31 PROCEDURE — 1160F RVW MEDS BY RX/DR IN RCRD: CPT | Mod: CPTII,S$GLB,, | Performed by: NURSE PRACTITIONER

## 2024-01-31 PROCEDURE — 3008F BODY MASS INDEX DOCD: CPT | Mod: CPTII,S$GLB,, | Performed by: NURSE PRACTITIONER

## 2024-01-31 PROCEDURE — 71046 X-RAY EXAM CHEST 2 VIEWS: CPT | Mod: TC,PO

## 2024-01-31 PROCEDURE — 1159F MED LIST DOCD IN RCRD: CPT | Mod: CPTII,S$GLB,, | Performed by: NURSE PRACTITIONER

## 2024-01-31 PROCEDURE — 3075F SYST BP GE 130 - 139MM HG: CPT | Mod: CPTII,S$GLB,, | Performed by: NURSE PRACTITIONER

## 2024-01-31 PROCEDURE — 3079F DIAST BP 80-89 MM HG: CPT | Mod: CPTII,S$GLB,, | Performed by: NURSE PRACTITIONER

## 2024-01-31 PROCEDURE — 99213 OFFICE O/P EST LOW 20 MIN: CPT | Mod: S$GLB,,, | Performed by: NURSE PRACTITIONER

## 2024-01-31 RX ORDER — BALOXAVIR MARBOXIL 80 MG/1
1 TABLET, FILM COATED ORAL ONCE
COMMUNITY
Start: 2023-12-04 | End: 2024-03-01

## 2024-01-31 RX ORDER — FAMOTIDINE 20 MG/1
20 TABLET, FILM COATED ORAL NIGHTLY
Qty: 30 TABLET | Refills: 0 | Status: SHIPPED | OUTPATIENT
Start: 2024-01-31 | End: 2024-03-15

## 2024-01-31 RX ORDER — PANTOPRAZOLE SODIUM 40 MG/1
40 TABLET, DELAYED RELEASE ORAL DAILY
Qty: 30 TABLET | Refills: 1 | Status: SHIPPED | OUTPATIENT
Start: 2024-01-31 | End: 2024-03-01 | Stop reason: SDUPTHER

## 2024-01-31 RX ORDER — BENZONATATE 200 MG/1
200 CAPSULE ORAL 3 TIMES DAILY PRN
Qty: 30 CAPSULE | Refills: 0 | Status: SHIPPED | OUTPATIENT
Start: 2024-01-31 | End: 2024-02-10

## 2024-01-31 RX ORDER — PREDNISONE 20 MG/1
40 TABLET ORAL
COMMUNITY
Start: 2023-10-22 | End: 2024-03-01

## 2024-01-31 NOTE — PROGRESS NOTES
SUBJECTIVE:      Patient ID: Swapnil Dillard is a 33 y.o. male.    Chief Complaint: Follow-up (No refills needed/Pt states he's been coughing for 3 months off and on most times its a dry cough/ no other symptoms  )    Mr Dillard is here today complaining of an ongoing for for the past few months. He had an allergic reaction and was following with an allergist, was prescribed steroids for a while. He mekhi to urgent care over a month ago and was treated for the flu. Says the cough was present prior to the flu, the cough is worse at night. Also have throat clearing. Strong hx on GERD, takes prilosec otc. Heartburn symptoms have been worse lately    Cough  This is a chronic problem. The current episode started more than 1 month ago. The problem has been waxing and waning. The problem occurs every few hours. The cough is Non-productive. Associated symptoms include heartburn. Pertinent negatives include no chest pain, chills, ear congestion, headaches, hemoptysis, nasal congestion, postnasal drip, shortness of breath or wheezing. The symptoms are aggravated by cold air and exercise. He has tried OTC cough suppressant and rest for the symptoms. The treatment provided mild relief. There is no history of bronchiectasis, bronchitis, COPD, emphysema or pneumonia.   Gastroesophageal Reflux  He complains of coughing, heartburn and nausea. He reports no abdominal pain, no chest pain, no choking or no wheezing. This is a chronic problem. The current episode started more than 1 year ago. The problem occurs frequently. The problem has been unchanged. The heartburn is located in the abdomen. The heartburn is of mild intensity. The heartburn does not wake him from sleep. The heartburn does not limit his activity. The heartburn changes with position. The symptoms are aggravated by certain foods, lying down and tight clothes. Risk factors include NSAIDs, lack of exercise and obesity. He has tried a PPI for the symptoms. The treatment  provided mild relief.       Past Surgical History:   Procedure Laterality Date    EPIDURAL STEROID INJECTION INTO THORACIC SPINE N/A 3/24/2023    Procedure: INJECTION, STEROID, SPINE, THORACIC, EPIDURAL;  Surgeon: Scooter Norris MD;  Location: [facility] OR;  Service: Pain Management;  Laterality: N/A;  T7-T8  Dr Sharp    LAPAROSCOPIC NISSEN FUNDOPLICATION       Family History   Problem Relation Age of Onset    Allergies Mother     Hypertension Father     Melanoma Neg Hx     Psoriasis Neg Hx     Eczema Neg Hx     Lupus Neg Hx       Social History     Socioeconomic History    Marital status:      Spouse name: Chidi    Number of children: 0   Occupational History     Comment: [employer]   Tobacco Use    Smoking status: Never     Passive exposure: Never    Smokeless tobacco: Never   Substance and Sexual Activity    Alcohol use: Yes     Comment: very rarely    Drug use: No    Sexual activity: Yes     Partners: Female     Social Determinants of Health     Financial Resource Strain: Medium Risk (1/29/2024)    Overall Financial Resource Strain (CARDIA)     Difficulty of Paying Living Expenses: Somewhat hard   Food Insecurity: No Food Insecurity (1/29/2024)    Hunger Vital Sign     Worried About Running Out of Food in the Last Year: Never true     Ran Out of Food in the Last Year: Never true   Transportation Needs: No Transportation Needs (1/29/2024)    PRAPARE - Transportation     Lack of Transportation (Medical): No     Lack of Transportation (Non-Medical): No   Physical Activity: Insufficiently Active (1/29/2024)    Exercise Vital Sign     Days of Exercise per Week: 3 days     Minutes of Exercise per Session: 20 min   Stress: Stress Concern Present (1/29/2024)    [place] [employer] - Occupational Stress Questionnaire     Feeling of Stress : Very much   Social Connections: Unknown (1/29/2024)    Social Connection and Isolation Panel [NHANES]     Frequency of Communication with Friends  and Family: More than three times a week     Frequency of Social Gatherings with Friends and Family: Once a week     Active Member of Clubs or Organizations: No     Attends Club or Organization Meetings: Never     Marital Status:    Housing Stability: Low Risk  (2024)    Housing Stability Vital Sign     Unable to Pay for Housing in the Last Year: No     Number of Places Lived in the Last Year: 1     Unstable Housing in the Last Year: No     Current Outpatient Medications   Medication Sig Dispense Refill    cyanocobalamin (VITAMIN B-12) 1000 MCG tablet Take 1,000 mcg by mouth.      EPINEPHrine (EPIPEN JR) 0.15 mg/0.3 mL pen injection SMARTSI IM Daily      naproxen (NAPROSYN) 500 MG tablet Take 1 tablet (500 mg total) by mouth daily as needed (migrain). 30 tablet 0    rizatriptan (MAXALT) 10 MG tablet Take 1 tablet (10 mg total) by mouth as needed for Migraine. 20 tablet 1    venlafaxine (EFFEXOR-XR) 75 MG 24 hr capsule Take 1 capsule (75 mg total) by mouth once daily. 90 capsule 1    vitamin D (VITAMIN D3) 1000 units Tab Take 1,000 Units by mouth.      benzonatate (TESSALON) 200 MG capsule Take 1 capsule (200 mg total) by mouth 3 (three) times daily as needed for Cough. 30 capsule 0    cetirizine (ZYRTEC) 10 MG tablet Take 1 tablet (10 mg total) by mouth once daily. (Patient not taking: Reported on 2024) 30 tablet 0    famotidine (PEPCID) 20 MG tablet Take 1 tablet (20 mg total) by mouth every evening. 30 tablet 0    loratadine (CLARITIN) 10 mg tablet Take 10 mg by mouth.      pantoprazole (PROTONIX) 40 MG tablet Take 1 tablet (40 mg total) by mouth once daily. 30 tablet 1    predniSONE (DELTASONE) 20 MG tablet Take 40 mg by mouth.      XOFLUZA 80 mg tablet Take 1 tablet by mouth once.       Current Facility-Administered Medications   Medication Dose Route Frequency Provider Last Rate Last Admin    methylPREDNISolone acetate injection 40 mg  40 mg Intramuscular 1 time in Clinic/HOD Aron  Cuate SAMANIEGO MD         Facility-Administered Medications Ordered in Other Visits   Medication Dose Route Frequency Provider Last Rate Last Admin    lactated ringers infusion   Intravenous Continuous Scooter Norris MD 25 mL/hr at 03/24/23 0835 New Bag at 03/24/23 0835     Review of patient's allergies indicates:   Allergen Reactions    Cat/feline products     House dust mite       Past Medical History:   Diagnosis Date    Allergy     Anxiety     Conjunctivitis     Corneal abrasion 2014    Depression     History of endoscopy 12/14/2018    Nissen fundoplication was formed. Wrap appears to be intact. Mild schatzki ring. Dilated. No gross lesions in the duodenal bulb and second part of the duodenum. Dr. Elmira Zaman    Migraine     ADRIAN (obstructive sleep apnea) 2010     Past Surgical History:   Procedure Laterality Date    EPIDURAL STEROID INJECTION INTO THORACIC SPINE N/A 3/24/2023    Procedure: INJECTION, STEROID, SPINE, THORACIC, EPIDURAL;  Surgeon: Scooter Norris MD;  Location: [facility];  Service: Pain Management;  Laterality: N/A;  T7-T8  Dr Sharp    LAPAROSCOPIC NISSEN FUNDOPLICATION         Review of Systems   Constitutional:  Negative for appetite change, chills, diaphoresis and unexpected weight change.   HENT:  Negative for ear discharge, facial swelling, hearing loss, nosebleeds, postnasal drip and trouble swallowing.    Eyes:  Negative for photophobia, pain and visual disturbance.   Respiratory:  Positive for cough. Negative for apnea, hemoptysis, choking, shortness of breath and wheezing.    Cardiovascular:  Negative for chest pain and palpitations.   Gastrointestinal:  Positive for heartburn and nausea. Negative for abdominal pain, blood in stool and vomiting.   Endocrine: Negative for polyphagia.   Genitourinary:  Negative for difficulty urinating and hematuria.   Musculoskeletal:  Negative for gait problem and joint swelling.   Skin:  Negative for pallor.   Neurological:  Negative for dizziness, seizures,  "speech difficulty, weakness, light-headedness and headaches.   Hematological:  Does not bruise/bleed easily.   Psychiatric/Behavioral:  Negative for agitation, confusion and dysphoric mood. The patient is not nervous/anxious.       OBJECTIVE:      Vitals:    01/31/24 1411   BP: (!) 131/93   BP Location: Left arm   Patient Position: Sitting   Pulse: 91   SpO2: 97%   Weight: 118.4 kg (261 lb)   Height: 5' 10" (1.778 m)     Physical Exam  Vitals and nursing note reviewed.   Constitutional:       General: He is not in acute distress.     Appearance: He is well-developed.   HENT:      Head: Normocephalic and atraumatic.      Nose: Nose normal.      Mouth/Throat:      Pharynx: Uvula midline.   Eyes:      General: Lids are normal.      Conjunctiva/sclera: Conjunctivae normal.      Pupils: Pupils are equal, round, and reactive to light.      Right eye: Pupil is round and reactive.      Left eye: Pupil is round and reactive.   Neck:      Thyroid: No thyromegaly.      Vascular: No carotid bruit.   Cardiovascular:      Rate and Rhythm: Regular rhythm.      Pulses: Normal pulses.      Heart sounds: Normal heart sounds.   Pulmonary:      Effort: Pulmonary effort is normal.      Breath sounds: Normal breath sounds. No wheezing, rhonchi or rales.   Abdominal:      General: Bowel sounds are normal.      Palpations: Abdomen is soft. Abdomen is not rigid.      Tenderness: There is no abdominal tenderness.   Musculoskeletal:         General: Normal range of motion.      Cervical back: Normal range of motion and neck supple.      Right lower leg: No edema.      Left lower leg: No edema.   Lymphadenopathy:      Cervical: No cervical adenopathy.   Skin:     General: Skin is warm and dry.      Nails: There is no clubbing.   Neurological:      Mental Status: He is alert and oriented to person, place, and time.   Psychiatric:         Mood and Affect: Mood normal.         Speech: Speech normal.         Behavior: Behavior normal. Behavior is " cooperative.         Thought Content: Thought content normal.         Judgment: Judgment normal.       Last visit note, most recent available labs, and health maintenance reviewed     Assessment:       1. Acute cough    2. Gastroesophageal reflux disease, unspecified whether esophagitis present        Plan:       Acute cough  -     X-Ray Chest PA And Lateral; Future; Expected date: 01/31/2024  -   start benzonatate (TESSALON) 200 MG capsule; Take 1 capsule (200 mg total) by mouth 3 (three) times daily as needed for Cough.  Dispense: 30 capsule; Refill: 0    Gastroesophageal reflux disease, unspecified whether esophagitis present  -  start  pantoprazole (PROTONIX) 40 MG tablet; Take 1 tablet (40 mg total) by mouth once daily.  Dispense: 30 tablet; Refill: 1  -     famotidine (PEPCID) 20 MG tablet; Take 1 tablet (20 mg total) by mouth every evening.  Dispense: 30 tablet; Refill: 0  Stop Prilosec        Follow up for has f/u .      1/31/2024 TOBIN Combs, FNP

## 2024-02-14 ENCOUNTER — TELEPHONE (OUTPATIENT)
Dept: FAMILY MEDICINE | Facility: CLINIC | Age: 34
End: 2024-02-14
Payer: COMMERCIAL

## 2024-02-14 DIAGNOSIS — Z00.00 PREVENTATIVE HEALTH CARE: Primary | ICD-10-CM

## 2024-02-14 DIAGNOSIS — E55.9 VITAMIN D DEFICIENCY: ICD-10-CM

## 2024-02-14 DIAGNOSIS — E53.8 VITAMIN B 12 DEFICIENCY: ICD-10-CM

## 2024-02-16 DIAGNOSIS — F41.8 DEPRESSION WITH ANXIETY: ICD-10-CM

## 2024-02-16 RX ORDER — VENLAFAXINE HYDROCHLORIDE 75 MG/1
75 CAPSULE, EXTENDED RELEASE ORAL
Qty: 30 CAPSULE | Refills: 0 | Status: SHIPPED | OUTPATIENT
Start: 2024-02-16 | End: 2024-03-01 | Stop reason: SDUPTHER

## 2024-02-22 LAB
25(OH)D3 SERPL-MCNC: 47 NG/ML (ref 30–100)
ALBUMIN SERPL-MCNC: 4 G/DL (ref 3.6–5.1)
ALBUMIN/GLOB SERPL: 1.5 (CALC) (ref 1–2.5)
ALP SERPL-CCNC: 55 U/L (ref 36–130)
ALT SERPL-CCNC: 25 U/L (ref 9–46)
APPEARANCE UR: CLEAR
AST SERPL-CCNC: 19 U/L (ref 10–40)
BASOPHILS # BLD AUTO: 48 CELLS/UL (ref 0–200)
BASOPHILS NFR BLD AUTO: 0.7 %
BILIRUB SERPL-MCNC: 0.4 MG/DL (ref 0.2–1.2)
BILIRUB UR QL STRIP: NEGATIVE
BUN SERPL-MCNC: 13 MG/DL (ref 7–25)
BUN/CREAT SERPL: NORMAL (CALC) (ref 6–22)
CALCIUM SERPL-MCNC: 9.3 MG/DL (ref 8.6–10.3)
CHLORIDE SERPL-SCNC: 105 MMOL/L (ref 98–110)
CHOLEST SERPL-MCNC: 231 MG/DL
CHOLEST/HDLC SERPL: 4.7 (CALC)
CO2 SERPL-SCNC: 28 MMOL/L (ref 20–32)
COLOR UR: YELLOW
CREAT SERPL-MCNC: 1.19 MG/DL (ref 0.6–1.26)
EGFR: 82 ML/MIN/1.73M2
EOSINOPHIL # BLD AUTO: 129 CELLS/UL (ref 15–500)
EOSINOPHIL NFR BLD AUTO: 1.9 %
ERYTHROCYTE [DISTWIDTH] IN BLOOD BY AUTOMATED COUNT: 12.2 % (ref 11–15)
GLOBULIN SER CALC-MCNC: 2.6 G/DL (CALC) (ref 1.9–3.7)
GLUCOSE SERPL-MCNC: 99 MG/DL (ref 65–99)
GLUCOSE UR QL STRIP: NEGATIVE
HCT VFR BLD AUTO: 43.8 % (ref 38.5–50)
HDLC SERPL-MCNC: 49 MG/DL
HGB BLD-MCNC: 14.6 G/DL (ref 13.2–17.1)
HGB UR QL STRIP: NEGATIVE
KETONES UR QL STRIP: NEGATIVE
LDLC SERPL CALC-MCNC: 150 MG/DL (CALC)
LEUKOCYTE ESTERASE UR QL STRIP: NEGATIVE
LYMPHOCYTES # BLD AUTO: 2054 CELLS/UL (ref 850–3900)
LYMPHOCYTES NFR BLD AUTO: 30.2 %
MCH RBC QN AUTO: 30.5 PG (ref 27–33)
MCHC RBC AUTO-ENTMCNC: 33.3 G/DL (ref 32–36)
MCV RBC AUTO: 91.4 FL (ref 80–100)
MONOCYTES # BLD AUTO: 660 CELLS/UL (ref 200–950)
MONOCYTES NFR BLD AUTO: 9.7 %
NEUTROPHILS # BLD AUTO: 3910 CELLS/UL (ref 1500–7800)
NEUTROPHILS NFR BLD AUTO: 57.5 %
NITRITE UR QL STRIP: NEGATIVE
NONHDLC SERPL-MCNC: 182 MG/DL (CALC)
PH UR STRIP: 5.5 [PH] (ref 5–8)
PLATELET # BLD AUTO: 321 THOUSAND/UL (ref 140–400)
PMV BLD REES-ECKER: 10.5 FL (ref 7.5–12.5)
POTASSIUM SERPL-SCNC: 4.1 MMOL/L (ref 3.5–5.3)
PROT SERPL-MCNC: 6.6 G/DL (ref 6.1–8.1)
PROT UR QL STRIP: NEGATIVE
RBC # BLD AUTO: 4.79 MILLION/UL (ref 4.2–5.8)
SODIUM SERPL-SCNC: 142 MMOL/L (ref 135–146)
SP GR UR STRIP: 1.02 (ref 1–1.03)
TRIGL SERPL-MCNC: 187 MG/DL
TSH SERPL-ACNC: 1.71 MIU/L (ref 0.4–4.5)
VIT B12 SERPL-MCNC: 523 PG/ML (ref 200–1100)
WBC # BLD AUTO: 6.8 THOUSAND/UL (ref 3.8–10.8)

## 2024-02-23 ENCOUNTER — PATIENT MESSAGE (OUTPATIENT)
Dept: FAMILY MEDICINE | Facility: CLINIC | Age: 34
End: 2024-02-23
Payer: COMMERCIAL

## 2024-02-29 NOTE — PROGRESS NOTES
SUBJECTIVE:      Patient ID: Swapnil Dillard is a 33 y.o. male.    Chief Complaint: Annual Exam    Mr Dillard is here today for his annual exam and review labs.  He is doing well, GERD is improved with Protonix     Anxiety  Presents for follow-up visit. Symptoms include depressed mood and excessive worry. Patient reports no chest pain, confusion, decreased concentration, dizziness, feeling of choking, irritability, nervous/anxious behavior, palpitations, panic, shortness of breath or suicidal ideas. Symptoms occur occasionally. The severity of symptoms is mild. The quality of sleep is good. Nighttime awakenings: occasional.     Compliance with medications is %.   Depression  Visit Type: follow-up  Patient presents with the following symptoms: depressed mood and excessive worry.  Patient is not experiencing: choking sensation, confusion, decreased concentration, feelings of hopelessness, feelings of worthlessness, irritability, nervousness/anxiety, palpitations, panic, shortness of breath, suicidal ideas, suicidal planning, thoughts of death and weight gain.  Frequency of symptoms: occasionally   Severity: mild   Sleep quality: good  Nighttime awakenings: occasional  Compliance with medications:  %        Past Surgical History:   Procedure Laterality Date    EPIDURAL STEROID INJECTION INTO THORACIC SPINE N/A 3/24/2023    Procedure: INJECTION, STEROID, SPINE, THORACIC, EPIDURAL;  Surgeon: Scooter Norris MD;  Location: [facility] OR;  Service: Pain Management;  Laterality: N/A;  T7-T8  Dr Sharp    LAPAROSCOPIC NISSEN FUNDOPLICATION       Family History   Problem Relation Age of Onset    Allergies Mother     Hypertension Father     Melanoma Neg Hx     Psoriasis Neg Hx     Eczema Neg Hx     Lupus Neg Hx       Social History     Socioeconomic History    Marital status:      Spouse name: Chidi    Number of children: 0   Occupational History     Comment:  [employer]   Tobacco Use    Smoking status:  Never     Passive exposure: Never    Smokeless tobacco: Never   Substance and Sexual Activity    Alcohol use: Yes     Comment: very rarely    Drug use: No    Sexual activity: Yes     Partners: Female     Social Determinants of Health     Financial Resource Strain: Medium Risk (2024)    Overall Financial Resource Strain (CARDIA)     Difficulty of Paying Living Expenses: Somewhat hard   Food Insecurity: No Food Insecurity (2024)    Hunger Vital Sign     Worried About Running Out of Food in the Last Year: Never true     Ran Out of Food in the Last Year: Never true   Transportation Needs: No Transportation Needs (2024)    PRAPARE - Transportation     Lack of Transportation (Medical): No     Lack of Transportation (Non-Medical): No   Physical Activity: Insufficiently Active (2024)    Exercise Vital Sign     Days of Exercise per Week: 3 days     Minutes of Exercise per Session: 20 min   Stress: Stress Concern Present (2024)    [place] [employer] - Occupational Stress Questionnaire     Feeling of Stress : Very much   Social Connections: Unknown (2024)    Social Connection and Isolation Panel [NHANES]     Frequency of Communication with Friends and Family: More than three times a week     Frequency of Social Gatherings with Friends and Family: Once a week     Active Member of Clubs or Organizations: No     Attends Club or Organization Meetings: Never     Marital Status:    Housing Stability: Low Risk  (2024)    Housing Stability Vital Sign     Unable to Pay for Housing in the Last Year: No     Number of Places Lived in the Last Year: 1     Unstable Housing in the Last Year: No     Current Outpatient Medications   Medication Sig Dispense Refill    cyanocobalamin (VITAMIN B-12) 1000 MCG tablet Take 1,000 mcg by mouth.      EPINEPHrine (EPIPEN JR) 0.15 mg/0.3 mL pen injection SMARTSI IM Daily      famotidine (PEPCID) 20 MG tablet Take 1 tablet (20 mg total) by  mouth every evening. 30 tablet 0    naproxen (NAPROSYN) 500 MG tablet Take 1 tablet (500 mg total) by mouth daily as needed (migrain). 30 tablet 0    rizatriptan (MAXALT) 10 MG tablet Take 1 tablet (10 mg total) by mouth as needed for Migraine. 20 tablet 1    vitamin D (VITAMIN D3) 1000 units Tab Take 1,000 Units by mouth.      pantoprazole (PROTONIX) 40 MG tablet Take 1 tablet (40 mg total) by mouth once daily. 90 tablet 1    venlafaxine (EFFEXOR-XR) 75 MG 24 hr capsule Take 1 capsule (75 mg total) by mouth once daily. 90 capsule 1     Current Facility-Administered Medications   Medication Dose Route Frequency Provider Last Rate Last Admin    methylPREDNISolone acetate injection 40 mg  40 mg Intramuscular 1 time in Clinic/HOD Cuate Sharp MD         Facility-Administered Medications Ordered in Other Visits   Medication Dose Route Frequency Provider Last Rate Last Admin    lactated ringers infusion   Intravenous Continuous Scooter Norris MD 25 mL/hr at 03/24/23 0835 New Bag at 03/24/23 0835     Review of patient's allergies indicates:   Allergen Reactions    Cat/feline products     House dust mite       Past Medical History:   Diagnosis Date    Allergy     Anxiety     Conjunctivitis     Corneal abrasion 2014    Depression     History of endoscopy 12/14/2018    Nissen fundoplication was formed. Wrap appears to be intact. Mild schatzki ring. Dilated. No gross lesions in the duodenal bulb and second part of the duodenum. Dr. Elmira Zaman    Migraine     ADRIAN (obstructive sleep apnea) 2010     Past Surgical History:   Procedure Laterality Date    EPIDURAL STEROID INJECTION INTO THORACIC SPINE N/A 3/24/2023    Procedure: INJECTION, STEROID, SPINE, THORACIC, EPIDURAL;  Surgeon: Scooter Norris MD;  Location: [facility] OR;  Service: Pain Management;  Laterality: N/A;  T7-T8  Dr Sharp    LAPAROSCOPIC NISSEN FUNDOPLICATION         Review of Systems   Constitutional:  Negative for activity change, appetite change, chills,  "diaphoresis, irritability, unexpected weight change and weight gain.   HENT:  Negative for ear discharge, facial swelling, nosebleeds and trouble swallowing.    Eyes:  Negative for discharge and visual disturbance.   Respiratory:  Negative for apnea, choking, chest tightness, shortness of breath and wheezing.    Cardiovascular:  Negative for chest pain and palpitations.   Gastrointestinal:  Negative for abdominal pain, blood in stool, constipation and diarrhea.   Endocrine: Negative for polydipsia, polyphagia and polyuria.   Genitourinary:  Negative for difficulty urinating, hematuria and urgency.   Musculoskeletal:  Negative for arthralgias, gait problem and joint swelling.   Skin:  Negative for pallor.   Neurological:  Negative for dizziness, speech difficulty, weakness and headaches.   Hematological:  Does not bruise/bleed easily.   Psychiatric/Behavioral:  Positive for depression. Negative for agitation, confusion, decreased concentration, dysphoric mood, self-injury, sleep disturbance and suicidal ideas. The patient is not nervous/anxious.       OBJECTIVE:      Vitals:    03/01/24 0748   BP: (P) 100/80   Pulse: 108   SpO2: 97%   Weight: 116.1 kg (256 lb)   Height: 5' 10" (1.778 m)     Physical Exam  Vitals and nursing note reviewed.   Constitutional:       General: He is not in acute distress.     Appearance: He is well-developed.   HENT:      Head: Normocephalic and atraumatic.      Nose: Nose normal.      Mouth/Throat:      Pharynx: Uvula midline.   Eyes:      General: Lids are normal.      Conjunctiva/sclera: Conjunctivae normal.      Pupils: Pupils are equal, round, and reactive to light.      Right eye: Pupil is round and reactive.      Left eye: Pupil is round and reactive.   Neck:      Thyroid: No thyromegaly.      Vascular: No carotid bruit.   Cardiovascular:      Rate and Rhythm: Normal rate and regular rhythm.      Pulses: Normal pulses.      Heart sounds: Normal heart sounds. No murmur " heard.  Pulmonary:      Effort: Pulmonary effort is normal.      Breath sounds: Normal breath sounds. No wheezing, rhonchi or rales.   Abdominal:      General: Bowel sounds are normal.      Palpations: Abdomen is soft. Abdomen is not rigid.      Tenderness: There is no abdominal tenderness.   Musculoskeletal:         General: Normal range of motion.      Cervical back: Normal range of motion and neck supple.      Right lower leg: No edema.      Left lower leg: No edema.   Lymphadenopathy:      Cervical: No cervical adenopathy.   Skin:     General: Skin is warm and dry.      Nails: There is no clubbing.   Neurological:      Mental Status: He is alert and oriented to person, place, and time.   Psychiatric:         Mood and Affect: Mood normal.         Speech: Speech normal.         Behavior: Behavior normal. Behavior is cooperative.         Thought Content: Thought content normal.         Judgment: Judgment normal.          Telephone on 02/14/2024   Component Date Value Ref Range Status    WBC 02/21/2024 6.8  3.8 - 10.8 Thousand/uL Final    RBC 02/21/2024 4.79  4.20 - 5.80 Million/uL Final    Hemoglobin 02/21/2024 14.6  13.2 - 17.1 g/dL Final    Hematocrit 02/21/2024 43.8  38.5 - 50.0 % Final    MCV 02/21/2024 91.4  80.0 - 100.0 fL Final    MCH 02/21/2024 30.5  27.0 - 33.0 pg Final    MCHC 02/21/2024 33.3  32.0 - 36.0 g/dL Final    RDW 02/21/2024 12.2  11.0 - 15.0 % Final    Platelets 02/21/2024 321  140 - 400 Thousand/uL Final    MPV 02/21/2024 10.5  7.5 - 12.5 fL Final    Neutrophils, Abs 02/21/2024 3,910  1,500 - 7,800 cells/uL Final    Lymph # 02/21/2024 2,054  850 - 3,900 cells/uL Final    Mono # 02/21/2024 660  200 - 950 cells/uL Final    Eos # 02/21/2024 129  15 - 500 cells/uL Final    Baso # 02/21/2024 48  0 - 200 cells/uL Final    Neutrophils Relative 02/21/2024 57.5  % Final    Lymph % 02/21/2024 30.2  % Final    Mono % 02/21/2024 9.7  % Final    Eosinophil % 02/21/2024 1.9  % Final    Basophil % 02/21/2024  0.7  % Final    Glucose 02/21/2024 99  65 - 99 mg/dL Final    Comment:               Fasting reference interval         BUN 02/21/2024 13  7 - 25 mg/dL Final    Creatinine 02/21/2024 1.19  0.60 - 1.26 mg/dL Final    eGFR 02/21/2024 82  > OR = 60 mL/min/1.73m2 Final    BUN/Creatinine Ratio 02/21/2024 SEE NOTE:  6 - 22 (calc) Final    Comment:    Not Reported: BUN and Creatinine are within     reference range.            Sodium 02/21/2024 142  135 - 146 mmol/L Final    Potassium 02/21/2024 4.1  3.5 - 5.3 mmol/L Final    Chloride 02/21/2024 105  98 - 110 mmol/L Final    CO2 02/21/2024 28  20 - 32 mmol/L Final    Calcium 02/21/2024 9.3  8.6 - 10.3 mg/dL Final    Total Protein 02/21/2024 6.6  6.1 - 8.1 g/dL Final    Albumin 02/21/2024 4.0  3.6 - 5.1 g/dL Final    Globulin, Total 02/21/2024 2.6  1.9 - 3.7 g/dL (calc) Final    Albumin/Globulin Ratio 02/21/2024 1.5  1.0 - 2.5 (calc) Final    Total Bilirubin 02/21/2024 0.4  0.2 - 1.2 mg/dL Final    Alkaline Phosphatase 02/21/2024 55  36 - 130 U/L Final    AST 02/21/2024 19  10 - 40 U/L Final    ALT 02/21/2024 25  9 - 46 U/L Final    Cholesterol 02/21/2024 231 (H)  <200 mg/dL Final    HDL 02/21/2024 49  > OR = 40 mg/dL Final    Triglycerides 02/21/2024 187 (H)  <150 mg/dL Final    LDL Cholesterol 02/21/2024 150 (H)  mg/dL (calc) Final    Comment: Reference range: <100     Desirable range <100 mg/dL for primary prevention;    <70 mg/dL for patients with CHD or diabetic patients   with > or = 2 CHD risk factors.     LDL-C is now calculated using the Hair-Mueller   calculation, which is a validated novel method providing   better accuracy than the Friedewald equation in the   estimation of LDL-C.   Hair DEMPSEY et al. NEPTALI. 2013;310(19): 0944-3241   (http://education.[employer].com/faq/NBO964)      HDL/Cholesterol Ratio 02/21/2024 4.7  <5.0 (calc) Final    Non HDL Chol. (LDL+VLDL) 02/21/2024 182 (H)  <130 mg/dL (calc) Final    Comment: For patients with diabetes plus 1  major ASCVD risk   factor, treating to a non-HDL-C goal of <100 mg/dL   (LDL-C of <70 mg/dL) is considered a therapeutic   option.      TSH 02/21/2024 1.71  0.40 - 4.50 mIU/L Final    Color, UA 02/21/2024 YELLOW  YELLOW Final    Appearance, UA 02/21/2024 CLEAR  CLEAR Final    Specific Gravity, UA 02/21/2024 1.018  1.001 - 1.035 Final    pH, UA 02/21/2024 5.5  5.0 - 8.0 Final    Glucose, UA 02/21/2024 NEGATIVE  NEGATIVE Final    Bilirubin, UA 02/21/2024 NEGATIVE  NEGATIVE Final    Ketones, UA 02/21/2024 NEGATIVE  NEGATIVE Final    Occult Blood UA 02/21/2024 NEGATIVE  NEGATIVE Final    Protein, UA 02/21/2024 NEGATIVE  NEGATIVE Final    Nitrite, UA 02/21/2024 NEGATIVE  NEGATIVE Final    Leukocytes, UA 02/21/2024 NEGATIVE  NEGATIVE Final    Vitamin D, 25-OH, Total 02/21/2024 47  30 - 100 ng/mL Final    Comment: Vitamin D Status         25-OH Vitamin D:     Deficiency:                    <20 ng/mL  Insufficiency:             20 - 29 ng/mL  Optimal:                 > or = 30 ng/mL     For 25-OH Vitamin D testing on patients on   D2-supplementation and patients for whom quantitation   of D2 and D3 fractions is required, the QuestAssureD(TM)  25-OH VIT D, (D2,D3), LC/MS/MS is recommended: order   code 68838 (patients >2yrs).     See Note 1     Note 1     For additional information, please refer to   http://education.[employer]/faq/PWT387   (This link is being provided for informational/  educational purposes only.)      Vitamin B-12 02/21/2024 523  200 - 1,100 pg/mL Final   ]  Assessment:       1. Preventative health care    2. Vitamin D deficiency    3. Vitamin B 12 deficiency    4. Elevated LDL cholesterol level    5. Depression with anxiety    6. Gastroesophageal reflux disease, unspecified whether esophagitis present        Plan:       Preventative health care  Counseled on age and gender appropriate medical preventative services, including cancer screenings, immunizations, overall nutritional health, need for  a consistent exercise regimen and an overall push towards maintaining a vigorous and active lifestyle.     Vitamin D deficiency  Cont otc supplement    Vitamin B 12 deficiency  Cont otc supplement    Elevated LDL cholesterol level  LDL is elevated; recommend high fiber, low fat diet, daily metamucil supplement and daily aerobic exercise    Depression with anxiety  -     venlafaxine (EFFEXOR-XR) 75 MG 24 hr capsule; Take 1 capsule (75 mg total) by mouth once daily.  Dispense: 90 capsule; Refill: 1    Gastroesophageal reflux disease, unspecified whether esophagitis present  -     pantoprazole (PROTONIX) 40 MG tablet; Take 1 tablet (40 mg total) by mouth once daily.  Dispense: 90 tablet; Refill: 1          Follow up in about 6 months (around 9/1/2024) for anxiety .      3/1/2024 TOBIN Combs, FNP

## 2024-03-01 ENCOUNTER — OFFICE VISIT (OUTPATIENT)
Dept: FAMILY MEDICINE | Facility: CLINIC | Age: 34
End: 2024-03-01
Payer: COMMERCIAL

## 2024-03-01 VITALS — HEART RATE: 108 BPM | HEIGHT: 70 IN | OXYGEN SATURATION: 97 % | WEIGHT: 256 LBS | BODY MASS INDEX: 36.65 KG/M2

## 2024-03-01 DIAGNOSIS — E78.00 ELEVATED LDL CHOLESTEROL LEVEL: ICD-10-CM

## 2024-03-01 DIAGNOSIS — E53.8 VITAMIN B 12 DEFICIENCY: ICD-10-CM

## 2024-03-01 DIAGNOSIS — Z00.00 PREVENTATIVE HEALTH CARE: Primary | ICD-10-CM

## 2024-03-01 DIAGNOSIS — K21.9 GASTROESOPHAGEAL REFLUX DISEASE, UNSPECIFIED WHETHER ESOPHAGITIS PRESENT: ICD-10-CM

## 2024-03-01 DIAGNOSIS — F41.8 DEPRESSION WITH ANXIETY: ICD-10-CM

## 2024-03-01 DIAGNOSIS — E55.9 VITAMIN D DEFICIENCY: ICD-10-CM

## 2024-03-01 PROCEDURE — 1159F MED LIST DOCD IN RCRD: CPT | Mod: CPTII,S$GLB,, | Performed by: NURSE PRACTITIONER

## 2024-03-01 PROCEDURE — 99395 PREV VISIT EST AGE 18-39: CPT | Mod: S$GLB,,, | Performed by: NURSE PRACTITIONER

## 2024-03-01 PROCEDURE — 3008F BODY MASS INDEX DOCD: CPT | Mod: CPTII,S$GLB,, | Performed by: NURSE PRACTITIONER

## 2024-03-01 PROCEDURE — 1160F RVW MEDS BY RX/DR IN RCRD: CPT | Mod: CPTII,S$GLB,, | Performed by: NURSE PRACTITIONER

## 2024-03-01 RX ORDER — VENLAFAXINE HYDROCHLORIDE 75 MG/1
75 CAPSULE, EXTENDED RELEASE ORAL DAILY
Qty: 90 CAPSULE | Refills: 1 | Status: SHIPPED | OUTPATIENT
Start: 2024-03-01

## 2024-03-01 RX ORDER — PANTOPRAZOLE SODIUM 40 MG/1
40 TABLET, DELAYED RELEASE ORAL DAILY
Qty: 90 TABLET | Refills: 1 | Status: SHIPPED | OUTPATIENT
Start: 2024-03-01

## 2024-03-15 DIAGNOSIS — K21.9 GASTROESOPHAGEAL REFLUX DISEASE, UNSPECIFIED WHETHER ESOPHAGITIS PRESENT: ICD-10-CM

## 2024-03-15 RX ORDER — FAMOTIDINE 20 MG/1
20 TABLET, FILM COATED ORAL NIGHTLY
Qty: 30 TABLET | Refills: 0 | Status: SHIPPED | OUTPATIENT
Start: 2024-03-15 | End: 2024-04-12

## 2024-04-12 DIAGNOSIS — K21.9 GASTROESOPHAGEAL REFLUX DISEASE, UNSPECIFIED WHETHER ESOPHAGITIS PRESENT: ICD-10-CM

## 2024-04-12 RX ORDER — FAMOTIDINE 20 MG/1
20 TABLET, FILM COATED ORAL NIGHTLY
Qty: 30 TABLET | Refills: 0 | Status: SHIPPED | OUTPATIENT
Start: 2024-04-12 | End: 2024-05-16

## 2024-05-16 DIAGNOSIS — K21.9 GASTROESOPHAGEAL REFLUX DISEASE, UNSPECIFIED WHETHER ESOPHAGITIS PRESENT: ICD-10-CM

## 2024-05-16 RX ORDER — FAMOTIDINE 20 MG/1
20 TABLET, FILM COATED ORAL NIGHTLY
Qty: 30 TABLET | Refills: 0 | Status: SHIPPED | OUTPATIENT
Start: 2024-05-16

## 2024-05-16 NOTE — TELEPHONE ENCOUNTER
Please see the attached refill request.last seen 3/11/24   Dermal Autograft Text: The defect edges were debeveled with a #15c scalpel blade.  Given the location of the defect, shape of the defect and the proximity to free margins a dermal autograft was deemed most appropriate.  Using a sterile surgical marker, the primary defect shape was transferred to the donor site. The area thus outlined was incised deep to adipose tissue with a #15 scalpel blade.  The harvested graft was then trimmed of adipose and epidermal tissue until only dermis was left.  The skin graft was then placed in the primary defect and oriented appropriately.

## 2024-06-24 ENCOUNTER — TELEPHONE (OUTPATIENT)
Dept: PODIATRY | Facility: CLINIC | Age: 34
End: 2024-06-24
Payer: COMMERCIAL

## 2024-07-10 ENCOUNTER — OFFICE VISIT (OUTPATIENT)
Dept: PODIATRY | Facility: CLINIC | Age: 34
End: 2024-07-10
Payer: COMMERCIAL

## 2024-07-10 VITALS — HEIGHT: 70 IN | BODY MASS INDEX: 36.93 KG/M2 | WEIGHT: 257.94 LBS

## 2024-07-10 DIAGNOSIS — M79.671 PAIN IN BOTH FEET: ICD-10-CM

## 2024-07-10 DIAGNOSIS — M79.672 PAIN IN BOTH FEET: ICD-10-CM

## 2024-07-10 DIAGNOSIS — M62.462 GASTROCNEMIUS EQUINUS OF LEFT LOWER EXTREMITY: ICD-10-CM

## 2024-07-10 DIAGNOSIS — M62.461 GASTROCNEMIUS EQUINUS OF RIGHT LOWER EXTREMITY: ICD-10-CM

## 2024-07-10 DIAGNOSIS — Q66.71 PES CAVUS OF RIGHT FOOT: Primary | ICD-10-CM

## 2024-07-10 PROCEDURE — 1159F MED LIST DOCD IN RCRD: CPT | Mod: CPTII,S$GLB,, | Performed by: PODIATRIST

## 2024-07-10 PROCEDURE — 3008F BODY MASS INDEX DOCD: CPT | Mod: CPTII,S$GLB,, | Performed by: PODIATRIST

## 2024-07-10 PROCEDURE — 99213 OFFICE O/P EST LOW 20 MIN: CPT | Mod: S$GLB,,, | Performed by: PODIATRIST

## 2024-07-10 PROCEDURE — 1160F RVW MEDS BY RX/DR IN RCRD: CPT | Mod: CPTII,S$GLB,, | Performed by: PODIATRIST

## 2024-07-10 PROCEDURE — 99999 PR PBB SHADOW E&M-EST. PATIENT-LVL III: CPT | Mod: PBBFAC,,, | Performed by: PODIATRIST

## 2024-07-10 NOTE — PROGRESS NOTES
"  [address] [place] [id number]  Phone: [phone]   [phone]    Patient's PCP:Cristian Abdalla NP  Referring Provider: No ref. provider found    Subjective:      Chief Complaint:: Foot Pain (Bilateral, ankle area, heel, and arch )    Foot Pain  Pertinent negatives include no abdominal pain, arthralgias, chest pain, chills, coughing, fatigue, fever, headaches, joint swelling, myalgias, nausea, neck pain, numbness, rash or weakness.     Swapnil Dillard is a 33 y.o. male who presents today with a complaint of Bilateral, ankle area, heel, and arch. The current episode started about 2 months ago.  The symptoms include throbbing and aching with some sharp pain that is off and on. Probable cause of complaint unknown.  The symptoms are aggravated by prolonged walking and standing. The problem has stayed the same. Treatment to date have included ice, soaking, elevation, and otc pain medication which provided some relief.       Vitals:    07/10/24 1531   Weight: 117 kg (257 lb 15 oz)   Height: 5' 10" (1.778 m)   PainSc:   5      Shoe Size: 11 wide    Past Surgical History:   Procedure Laterality Date    EPIDURAL STEROID INJECTION INTO THORACIC SPINE N/A 3/24/2023    Procedure: INJECTION, STEROID, SPINE, THORACIC, EPIDURAL;  Surgeon: Scooter Norris MD;  Location: [facility] OR;  Service: Pain Management;  Laterality: N/A;  T7-T8  Dr Sharp    LAPAROSCOPIC NISSEN FUNDOPLICATION       Past Medical History:   Diagnosis Date    Allergy     Anxiety     Conjunctivitis     Corneal abrasion 2014    Depression     History of endoscopy 12/14/2018    Nissen fundoplication was formed. Wrap appears to be intact. Mild schatzki ring. Dilated. No gross lesions in the duodenal bulb and second part of the duodenum. Dr. Elmira Zaman    Migraine     ADRIAN (obstructive sleep apnea) 2010     Family History   Problem Relation Name Age of Onset    Allergies Mother      Hypertension Father      Melanoma Neg Hx      Psoriasis Neg Hx "      Eczema Neg Hx      Lupus Neg Hx          Social History:   Marital Status:   Alcohol History:  reports current alcohol use.  Tobacco History:  reports that he has never smoked. He has never been exposed to tobacco smoke. He has never used smokeless tobacco.  Drug History:  reports no history of drug use.    Review of patient's allergies indicates:   Allergen Reactions    Cat/feline products     House dust mite        Current Outpatient Medications   Medication Sig Dispense Refill    cyanocobalamin (VITAMIN B-12) 1000 MCG tablet Take 1,000 mcg by mouth.      famotidine (PEPCID) 20 MG tablet TAKE 1 TABLET BY MOUTH EVERY DAY IN THE EVENING 30 tablet 0    naproxen (NAPROSYN) 500 MG tablet Take 1 tablet (500 mg total) by mouth daily as needed (migrain). 30 tablet 0    pantoprazole (PROTONIX) 40 MG tablet Take 1 tablet (40 mg total) by mouth once daily. 90 tablet 1    venlafaxine (EFFEXOR-XR) 75 MG 24 hr capsule Take 1 capsule (75 mg total) by mouth once daily. 90 capsule 1    vitamin D (VITAMIN D3) 1000 units Tab Take 1,000 Units by mouth.      EPINEPHrine (EPIPEN JR) 0.15 mg/0.3 mL pen injection SMARTSI IM Daily      rizatriptan (MAXALT) 10 MG tablet Take 1 tablet (10 mg total) by mouth as needed for Migraine. 20 tablet 1     Current Facility-Administered Medications   Medication Dose Route Frequency Provider Last Rate Last Admin    methylPREDNISolone acetate injection 40 mg  40 mg Intramuscular 1 time in Clinic/HOD Cuate Sharp MD         Facility-Administered Medications Ordered in Other Visits   Medication Dose Route Frequency Provider Last Rate Last Admin    lactated ringers infusion   Intravenous Continuous Scooter Norris MD 25 mL/hr at 23 0835 New Bag at 23 0835       Review of Systems   Constitutional:  Negative for chills, fatigue, fever and unexpected weight change.   HENT:  Negative for hearing loss and trouble swallowing.    Eyes:  Negative for photophobia and visual  disturbance.   Respiratory:  Negative for cough, shortness of breath and wheezing.    Cardiovascular:  Negative for chest pain, palpitations and leg swelling.   Gastrointestinal:  Negative for abdominal pain and nausea.   Genitourinary:  Negative for dysuria and frequency.   Musculoskeletal:  Negative for arthralgias, back pain, gait problem, joint swelling, myalgias and neck pain.   Skin:  Negative for rash and wound.   Neurological:  Negative for tremors, seizures, weakness, numbness and headaches.   Hematological:  Does not bruise/bleed easily.         Objective:        Physical Exam:   Foot Exam    General  General Appearance: appears stated age and healthy   Orientation: alert and oriented to person, place, and time   Affect: appropriate   Gait: unimpaired       Right Foot/Ankle     Inspection and Palpation  Ecchymosis: none  Tenderness: plantar fascia and midtarsal joint   Swelling: none   Arch: pes cavus  Hammertoes: absent  Claw Toes: absent  Hallux valgus: no  Hallux limitus: no  Skin Exam: skin intact;   Neurovascular  Dorsalis pedis: 2+  Posterior tibial: 2+  Capillary Refill: 2+  Saphenous nerve sensation: normal  Tibial nerve sensation: normal  Superficial peroneal nerve sensation: normal  Deep peroneal nerve sensation: normal  Sural nerve sensation: normal    Muscle Strength  Ankle dorsiflexion: 5  Ankle plantar flexion: 5  Ankle inversion: 5  Ankle eversion: 5  Great toe extension: 5  Great toe flexion: 5    Range of Motion    Passive  Ankle dorsiflexion: 0    Active  Ankle dorsiflexion: 0    Tests  Calcaneal squeeze: negative   PT Tinel's sign: negative    Too many toes: negative   Paresthesia: negative    Left Foot/Ankle      Inspection and Palpation  Ecchymosis: none  Tenderness: plantar fascia and midtarsal joint   Swelling: none   Arch: normal  Hammertoes: absent  Claw toes: absent  Hallux valgus: no  Hallux limitus: no  Skin Exam: skin intact;   Neurovascular  Dorsalis pedis: 2+  Posterior  tibial: 2+  Capillary refill: 2+  Saphenous nerve sensation: normal  Tibial nerve sensation: normal  Superficial peroneal nerve sensation: normal  Deep peroneal nerve sensation: normal  Sural nerve sensation: normal    Muscle Strength  Ankle dorsiflexion: 5  Ankle plantar flexion: 5  Ankle inversion: 5  Ankle eversion: 5  Great toe extension: 5  Great toe flexion: 5    Range of Motion    Passive  Ankle dorsiflexion: 5    Active  Ankle dorsiflexion: 5    Tests  Calcaneal squeeze: negative   PT Tinel's sign: negative  Too many toes: positive (Mild deformity)  Paresthesia: negative      Physical Exam  Cardiovascular:      Pulses:           Dorsalis pedis pulses are 2+ on the right side and 2+ on the left side.        Posterior tibial pulses are 2+ on the right side and 2+ on the left side.   Musculoskeletal:      Right foot: No bunion.      Left foot: No bunion.                   Muscle Strength   Right Lower Extremity   Ankle Dorsiflexion:  5   Plantar flexion:  5/5  Left Lower Extremity   Ankle Dorsiflexion:  5   Plantar flexion:  5/5     Vascular Exam     Right Pulses  Dorsalis Pedis:      2+  Posterior Tibial:      2+        Left Pulses  Dorsalis Pedis:      2+  Posterior Tibial:      2+           Imaging: none            Assessment:       1. Pes cavus of right foot    2. Pain in both feet    3. Gastrocnemius equinus of left lower extremity    4. Gastrocnemius equinus of right lower extremity      Plan:   Pes cavus of right foot    Pain in both feet    Gastrocnemius equinus of left lower extremity    Gastrocnemius equinus of right lower extremity      Follow up if symptoms worsen or fail to improve.    Procedures        I discussed with the patient that it appears the custom orthotics he had previously which had been working well have deteriorated.  As these had provided significant previous relief I am going to order an updated prescription.  His previous orthotics worked well so I will order the exact same  orthotics and modifications.  Also reviewed proper shoe gear to limit stress.    Counseling:     I provided patient education verbally regarding:   Patient diagnosis, treatment options, as well as alternatives, risks, and benefits.     This note was created using Dragon voice recognition software that occasionally misinterpreted phrases or words.

## 2024-07-12 ENCOUNTER — TELEPHONE (OUTPATIENT)
Dept: PAIN MEDICINE | Facility: CLINIC | Age: 34
End: 2024-07-12
Payer: COMMERCIAL

## 2024-07-12 ENCOUNTER — OFFICE VISIT (OUTPATIENT)
Dept: SPINE | Facility: CLINIC | Age: 34
End: 2024-07-12
Payer: COMMERCIAL

## 2024-07-12 VITALS — HEIGHT: 70 IN | BODY MASS INDEX: 36.93 KG/M2 | WEIGHT: 257.94 LBS

## 2024-07-12 DIAGNOSIS — M54.6 PAIN IN THORACIC SPINE: Primary | ICD-10-CM

## 2024-07-12 DIAGNOSIS — M54.14 THORACIC RADICULOPATHY: Primary | ICD-10-CM

## 2024-07-12 PROCEDURE — 99999 PR PBB SHADOW E&M-EST. PATIENT-LVL III: CPT | Mod: PBBFAC,,, | Performed by: PHYSICAL MEDICINE & REHABILITATION

## 2024-07-12 NOTE — H&P (VIEW-ONLY)
SUBJECTIVE:    Patient ID: Swapnil Dillard is a 33 y.o. male.    Chief Complaint: Follow-up    He presents today with recurrent familiar pain in the midthoracic region between the scapulae.  Started 2-3 weeks ago for no apparent reason.  Radiation down the arms or legs.  Bowel and bladder remained intact.  No fever chills sweats or unexpected weight loss.  Pain level is 7/10 and interferes with his quality of life in terms of activities of daily living recreation and social activities.  Historically he has responded favorably to interlaminar injections at T7-8.  His last injection was 03/24/2023 with Dr. Norris.  On follow-up he describes 80% improvement in his back pain symptoms with improved functional mobility.  Pain relief lasted until just a few weeks ago.  He is interested in repeating that procedure          Past Medical History:   Diagnosis Date    Allergy     Anxiety     Conjunctivitis     Corneal abrasion 2014    Depression     History of endoscopy 12/14/2018    Nissen fundoplication was formed. Wrap appears to be intact. Mild schatzki ring. Dilated. No gross lesions in the duodenal bulb and second part of the duodenum. Dr. Elmira Zaman    Migraine     ADRIAN (obstructive sleep apnea) 2010     Social History     Socioeconomic History    Marital status:      Spouse name: Chidi    Number of children: 0   Occupational History     Comment: [employer] go[employer]   Tobacco Use    Smoking status: Never     Passive exposure: Never    Smokeless tobacco: Never   Substance and Sexual Activity    Alcohol use: Yes     Comment: very rarely    Drug use: No    Sexual activity: Yes     Partners: Female     Social Determinants of Health     Financial Resource Strain: Medium Risk (1/29/2024)    Overall Financial Resource Strain (CARDIA)     Difficulty of Paying Living Expenses: Somewhat hard   Food Insecurity: No Food Insecurity (1/29/2024)    Hunger Vital Sign     Worried About Running Out of Food in the Last Year:  "Never true     Ran Out of Food in the Last Year: Never true   Transportation Needs: No Transportation Needs (1/29/2024)    PRAPARE - Transportation     Lack of Transportation (Medical): No     Lack of Transportation (Non-Medical): No   Physical Activity: Insufficiently Active (1/29/2024)    Exercise Vital Sign     Days of Exercise per Week: 3 days     Minutes of Exercise per Session: 20 min   Stress: Stress Concern Present (1/29/2024)    [place] [employer] - Occupational Stress Questionnaire     Feeling of Stress : Very much   Housing Stability: Low Risk  (1/29/2024)    Housing Stability Vital Sign     Unable to Pay for Housing in the Last Year: No     Number of Places Lived in the Last Year: 1     Unstable Housing in the Last Year: No     Past Surgical History:   Procedure Laterality Date    EPIDURAL STEROID INJECTION INTO THORACIC SPINE N/A 3/24/2023    Procedure: INJECTION, STEROID, SPINE, THORACIC, EPIDURAL;  Surgeon: Scooter Norris MD;  Location: [facility];  Service: Pain Management;  Laterality: N/A;  T7-T8  Dr Sharp    LAPAROSCOPIC NISSEN FUNDOPLICATION       Family History   Problem Relation Name Age of Onset    Allergies Mother      Hypertension Father      Melanoma Neg Hx      Psoriasis Neg Hx      Eczema Neg Hx      Lupus Neg Hx       Vitals:    07/12/24 1529   Weight: 117 kg (257 lb 15 oz)   Height: 5' 10" (1.778 m)       Review of Systems   Constitutional:  Negative for chills, diaphoresis, fatigue, fever and unexpected weight change.   HENT:  Negative for trouble swallowing.    Eyes:  Negative for visual disturbance.   Respiratory:  Negative for shortness of breath.    Cardiovascular:  Negative for chest pain.   Gastrointestinal:  Negative for abdominal pain, constipation, diarrhea, nausea and vomiting.   Genitourinary:  Negative for difficulty urinating.   Musculoskeletal:  Negative for arthralgias, back pain, gait problem, joint swelling, myalgias, neck pain and neck stiffness. "   Neurological:  Negative for dizziness, speech difficulty, weakness, light-headedness, numbness and headaches.          Objective:      Physical Exam  Neurological:      Mental Status: He is alert and oriented to person, place, and time.      Comments: He is awake and in no acute distress  Mild tenderness to palpation midthoracic paraspinous musculature with no palpable masses noted  Reflexes- +1-+2 reflexes at the following:   C5-Biceps   C6-Brachioradialis   C7-Triceps   L3/4-Patellar   S1-Achilles   Leidy sign negative bilaterally  Strength testing- 5/5 strength in the following muscle groups:  C5-Elbow flexion  C6-Wrist extension  C7-Elbow extension  C8-Finger flexion  T1-Finger abduction  L2-Hip flexion  L3-Knee extension  L4-Ankle dorsiflexion  L5-Great toe extension  S1-Ankle plantar flexion                    Assessment:       1. Pain in thoracic spine           Plan:     He remains neurologically intact.  Presents with familiar midthoracic pain.  Historically responsive to interlaminar injections at T7-8.  We will get him scheduled for repeat procedure.  Follow up with me afterwards.      Pain in thoracic spine

## 2024-07-12 NOTE — PROGRESS NOTES
SUBJECTIVE:    Patient ID: Swapnil Dillard is a 33 y.o. male.    Chief Complaint: Follow-up    He presents today with recurrent familiar pain in the midthoracic region between the scapulae.  Started 2-3 weeks ago for no apparent reason.  Radiation down the arms or legs.  Bowel and bladder remained intact.  No fever chills sweats or unexpected weight loss.  Pain level is 7/10 and interferes with his quality of life in terms of activities of daily living recreation and social activities.  Historically he has responded favorably to interlaminar injections at T7-8.  His last injection was 03/24/2023 with Dr. Norris.  On follow-up he describes 80% improvement in his back pain symptoms with improved functional mobility.  Pain relief lasted until just a few weeks ago.  He is interested in repeating that procedure          Past Medical History:   Diagnosis Date    Allergy     Anxiety     Conjunctivitis     Corneal abrasion 2014    Depression     History of endoscopy 12/14/2018    Nissen fundoplication was formed. Wrap appears to be intact. Mild schatzki ring. Dilated. No gross lesions in the duodenal bulb and second part of the duodenum. Dr. Elmira Zaman    Migraine     ADRIAN (obstructive sleep apnea) 2010     Social History     Socioeconomic History    Marital status:      Spouse name: Chidi    Number of children: 0   Occupational History     Comment: [employer] go[employer]   Tobacco Use    Smoking status: Never     Passive exposure: Never    Smokeless tobacco: Never   Substance and Sexual Activity    Alcohol use: Yes     Comment: very rarely    Drug use: No    Sexual activity: Yes     Partners: Female     Social Determinants of Health     Financial Resource Strain: Medium Risk (1/29/2024)    Overall Financial Resource Strain (CARDIA)     Difficulty of Paying Living Expenses: Somewhat hard   Food Insecurity: No Food Insecurity (1/29/2024)    Hunger Vital Sign     Worried About Running Out of Food in the Last Year:  "Never true     Ran Out of Food in the Last Year: Never true   Transportation Needs: No Transportation Needs (1/29/2024)    PRAPARE - Transportation     Lack of Transportation (Medical): No     Lack of Transportation (Non-Medical): No   Physical Activity: Insufficiently Active (1/29/2024)    Exercise Vital Sign     Days of Exercise per Week: 3 days     Minutes of Exercise per Session: 20 min   Stress: Stress Concern Present (1/29/2024)    [place] of Occupational Health - Occupational Stress Questionnaire     Feeling of Stress : Very much   Housing Stability: Low Risk  (1/29/2024)    Housing Stability Vital Sign     Unable to Pay for Housing in the Last Year: No     Number of Places Lived in the Last Year: 1     Unstable Housing in the Last Year: No     Past Surgical History:   Procedure Laterality Date    EPIDURAL STEROID INJECTION INTO THORACIC SPINE N/A 3/24/2023    Procedure: INJECTION, STEROID, SPINE, THORACIC, EPIDURAL;  Surgeon: Scooter Norris MD;  Location: [facility];  Service: Pain Management;  Laterality: N/A;  T7-T8  Dr Sharp    LAPAROSCOPIC NISSEN FUNDOPLICATION       Family History   Problem Relation Name Age of Onset    Allergies Mother      Hypertension Father      Melanoma Neg Hx      Psoriasis Neg Hx      Eczema Neg Hx      Lupus Neg Hx       Vitals:    07/12/24 1529   Weight: 117 kg (257 lb 15 oz)   Height: 5' 10" (1.778 m)       Review of Systems   Constitutional:  Negative for chills, diaphoresis, fatigue, fever and unexpected weight change.   HENT:  Negative for trouble swallowing.    Eyes:  Negative for visual disturbance.   Respiratory:  Negative for shortness of breath.    Cardiovascular:  Negative for chest pain.   Gastrointestinal:  Negative for abdominal pain, constipation, diarrhea, nausea and vomiting.   Genitourinary:  Negative for difficulty urinating.   Musculoskeletal:  Negative for arthralgias, back pain, gait problem, joint swelling, myalgias, neck pain and neck stiffness. "   Neurological:  Negative for dizziness, speech difficulty, weakness, light-headedness, numbness and headaches.          Objective:      Physical Exam  Neurological:      Mental Status: He is alert and oriented to person, place, and time.      Comments: He is awake and in no acute distress  Mild tenderness to palpation midthoracic paraspinous musculature with no palpable masses noted  Reflexes- +1-+2 reflexes at the following:   C5-Biceps   C6-Brachioradialis   C7-Triceps   L3/4-Patellar   S1-Achilles   Leiyd sign negative bilaterally  Strength testing- 5/5 strength in the following muscle groups:  C5-Elbow flexion  C6-Wrist extension  C7-Elbow extension  C8-Finger flexion  T1-Finger abduction  L2-Hip flexion  L3-Knee extension  L4-Ankle dorsiflexion  L5-Great toe extension  S1-Ankle plantar flexion                    Assessment:       1. Pain in thoracic spine           Plan:     He remains neurologically intact.  Presents with familiar midthoracic pain.  Historically responsive to interlaminar injections at T7-8.  We will get him scheduled for repeat procedure.  Follow up with me afterwards.      Pain in thoracic spine

## 2024-07-12 NOTE — TELEPHONE ENCOUNTER
----- Message from Cuate Sharp MD sent at 7/12/2024  3:40 PM CDT -----  Please schedule for interlaminar injection T7-8

## 2024-07-13 DIAGNOSIS — F41.8 DEPRESSION WITH ANXIETY: ICD-10-CM

## 2024-07-13 DIAGNOSIS — K21.9 GASTROESOPHAGEAL REFLUX DISEASE, UNSPECIFIED WHETHER ESOPHAGITIS PRESENT: ICD-10-CM

## 2024-07-15 RX ORDER — PANTOPRAZOLE SODIUM 40 MG/1
40 TABLET, DELAYED RELEASE ORAL
Qty: 90 TABLET | Refills: 1 | Status: SHIPPED | OUTPATIENT
Start: 2024-07-15

## 2024-07-15 RX ORDER — VENLAFAXINE HYDROCHLORIDE 75 MG/1
75 CAPSULE, EXTENDED RELEASE ORAL
Qty: 90 CAPSULE | Refills: 1 | Status: SHIPPED | OUTPATIENT
Start: 2024-07-15

## 2024-07-19 RX ORDER — SODIUM CHLORIDE, SODIUM LACTATE, POTASSIUM CHLORIDE, CALCIUM CHLORIDE 600; 310; 30; 20 MG/100ML; MG/100ML; MG/100ML; MG/100ML
INJECTION, SOLUTION INTRAVENOUS CONTINUOUS
OUTPATIENT
Start: 2024-07-19

## 2024-07-19 RX ORDER — LIDOCAINE HYDROCHLORIDE 10 MG/ML
1 INJECTION, SOLUTION EPIDURAL; INFILTRATION; INTRACAUDAL; PERINEURAL ONCE
OUTPATIENT
Start: 2024-07-19 | End: 2024-07-19

## 2024-07-25 ENCOUNTER — HOSPITAL ENCOUNTER (OUTPATIENT)
Facility: HOSPITAL | Age: 34
Discharge: HOME | End: 2024-07-25
Attending: ANESTHESIOLOGY | Admitting: ANESTHESIOLOGY
Payer: COMMERCIAL

## 2024-07-25 DIAGNOSIS — M54.14 THORACIC RADICULITIS: ICD-10-CM

## 2024-07-25 PROCEDURE — 62321 NJX INTERLAMINAR CRV/THRC: CPT | Mod: ,,, | Performed by: ANESTHESIOLOGY

## 2024-07-25 PROCEDURE — A4216 STERILE WATER/SALINE, 10 ML: HCPCS | Performed by: ANESTHESIOLOGY

## 2024-07-25 PROCEDURE — 62321 NJX INTERLAMINAR CRV/THRC: CPT | Performed by: ANESTHESIOLOGY

## 2024-07-25 PROCEDURE — 25000003 PHARM REV CODE 250: Performed by: ANESTHESIOLOGY

## 2024-07-25 PROCEDURE — 63600175 PHARM REV CODE 636 W HCPCS: Performed by: ANESTHESIOLOGY

## 2024-07-25 PROCEDURE — 25500020 PHARM REV CODE 255: Performed by: ANESTHESIOLOGY

## 2024-07-25 RX ORDER — SODIUM CHLORIDE 9 MG/ML
INJECTION, SOLUTION INTRAMUSCULAR; INTRAVENOUS; SUBCUTANEOUS
Status: DISCONTINUED | OUTPATIENT
Start: 2024-07-25 | End: 2024-07-25 | Stop reason: HOSPADM

## 2024-07-25 RX ORDER — MIDAZOLAM HYDROCHLORIDE 1 MG/ML
INJECTION INTRAMUSCULAR; INTRAVENOUS
Status: DISCONTINUED | OUTPATIENT
Start: 2024-07-25 | End: 2024-07-25 | Stop reason: HOSPADM

## 2024-07-25 RX ORDER — LIDOCAINE HYDROCHLORIDE 10 MG/ML
INJECTION, SOLUTION EPIDURAL; INFILTRATION; INTRACAUDAL; PERINEURAL
Status: DISCONTINUED | OUTPATIENT
Start: 2024-07-25 | End: 2024-07-25 | Stop reason: HOSPADM

## 2024-07-25 RX ORDER — DEXAMETHASONE SODIUM PHOSPHATE 10 MG/ML
INJECTION INTRAMUSCULAR; INTRAVENOUS
Status: DISCONTINUED | OUTPATIENT
Start: 2024-07-25 | End: 2024-07-25 | Stop reason: HOSPADM

## 2024-07-25 RX ORDER — FENTANYL CITRATE 50 UG/ML
INJECTION, SOLUTION INTRAMUSCULAR; INTRAVENOUS
Status: DISCONTINUED | OUTPATIENT
Start: 2024-07-25 | End: 2024-07-25 | Stop reason: HOSPADM

## 2024-07-25 NOTE — DISCHARGE SUMMARY
[facility] - Periop Services  Discharge Note  Short Stay    Procedure(s) (LRB):  Injection-steroid-epidural-thoracic t7-8 (N/A)      OUTCOME: Patient tolerated treatment/procedure well without complication and is now ready for discharge.    DISPOSITION: Home or Self Care    FINAL DIAGNOSIS:  <principal problem not specified>    FOLLOWUP: In clinic    DISCHARGE INSTRUCTIONS:    Discharge Procedure Orders   Notify your health care provider if you experience any of the following:  temperature >100.4     Notify your health care provider if you experience any of the following:  severe uncontrolled pain     Notify your health care provider if you experience any of the following:  redness, tenderness, or signs of infection (pain, swelling, redness, odor or green/yellow discharge around incision site)     Activity as tolerated        TIME SPENT ON DISCHARGE: 30 minutes

## 2024-07-25 NOTE — PLAN OF CARE
Patient is awake and alert and says he is ready to go home; his spouse says she is ready to take patient home and she is driving. Patient's vital signs and injection site stable. Patient denies pain, nausea, weakness or dizziness. All patient belongings have been returned to patient. Patient is in stable condition and being discharged via wheelchair to car his spouse is driving.

## 2024-07-25 NOTE — OP NOTE
PROCEDURE DATE: 7/25/2024    Procedure:   Interlaminar epidural steroid injection at T7-8  under fluoroscopic guidance.    Diagnosis: Thoracic radiculitis  pOSTOP DIAGNOSIS: sAME    Physician: Scooter Norris M.D.    Medications injected:10 mg dexamethasone with 4 ml of preservative free NaCl    Local anesthetic injected:    Lidocaine 1% 2 ml total    Sedation Medications: RN IV sedation    Estimated blood loss:  NOne    Complications:  None    Technique:  Time-out taken to identify patient and procedure prior to starting the procedure.  With the patient laying in a prone position, the area was prepped and draped in the usual sterile fashion using ChloraPrep and a fenestrated drape.  After determining the target level with an AP fluoroscopic view, local anesthetic was given using a 25-gauge 1.5 inch needle by raising a wheal and then infiltrating toward the interlaminar entry space.  A 3.5inch 20-gauge Touhy needle was introduced under AP fluoroscopic guidance to the interlaminar space of T7-8. Once the trajectory was established, the needle was visualized in the lateral view and advanced using loss of resistance technique. Once in the desired position, 1ml contrast was injected to confirm placement and there was no vascular uptake nor intrathecal spread.  The medication was then injected slowly. The patient tolerated the procedure well.      The patient was monitored after the procedure.   They were given post-procedure and discharge instructions to follow at home.  The patient was discharged in a stable condition.

## 2024-07-26 VITALS
DIASTOLIC BLOOD PRESSURE: 67 MMHG | SYSTOLIC BLOOD PRESSURE: 119 MMHG | BODY MASS INDEX: 36.93 KG/M2 | WEIGHT: 257.94 LBS | RESPIRATION RATE: 16 BRPM | OXYGEN SATURATION: 97 % | HEIGHT: 70 IN | TEMPERATURE: 98 F | HEART RATE: 89 BPM

## 2024-08-11 DIAGNOSIS — G43.809 OTHER MIGRAINE WITHOUT STATUS MIGRAINOSUS, NOT INTRACTABLE: ICD-10-CM

## 2024-08-12 RX ORDER — NAPROXEN 500 MG/1
500 TABLET ORAL DAILY PRN
Qty: 30 TABLET | Refills: 0 | Status: SHIPPED | OUTPATIENT
Start: 2024-08-12

## 2024-08-12 RX ORDER — RIZATRIPTAN BENZOATE 10 MG/1
10 TABLET ORAL
Qty: 20 TABLET | Refills: 1 | Status: SHIPPED | OUTPATIENT
Start: 2024-08-12 | End: 2024-09-11

## 2024-08-22 ENCOUNTER — OFFICE VISIT (OUTPATIENT)
Dept: SPINE | Facility: CLINIC | Age: 34
End: 2024-08-22
Payer: COMMERCIAL

## 2024-08-22 DIAGNOSIS — M54.6 PAIN IN THORACIC SPINE: Primary | ICD-10-CM

## 2024-08-22 PROCEDURE — 99999 PR PBB SHADOW E&M-EST. PATIENT-LVL II: CPT | Mod: PBBFAC,,, | Performed by: PHYSICAL MEDICINE & REHABILITATION

## 2024-08-22 NOTE — PROGRESS NOTES
SUBJECTIVE:    Patient ID: Swapnil Dillard is a 33 y.o. male.    Chief Complaint: No chief complaint on file.    He is here for follow-up status post interlaminar injection T7-8 on 07/25/2024 with Dr. Norris.  Excellent response to that procedure.  He describes 90% improvement in his midthoracic pain with improved functional mobility.  He is satisfied with his quality of life.  Pain level is 0.  He has no new or progressive problems          Past Medical History:   Diagnosis Date    Allergy     Anxiety     Conjunctivitis     Corneal abrasion 2014    Depression     History of endoscopy 12/14/2018    Nissen fundoplication was formed. Wrap appears to be intact. Mild schatzki ring. Dilated. No gross lesions in the duodenal bulb and second part of the duodenum. Dr. Elmira Zaman    Migraine     ADRIAN (obstructive sleep apnea) 2010     Social History     Socioeconomic History    Marital status:      Spouse name: Chiid    Number of children: 0   Occupational History     Comment: [employer]   Tobacco Use    Smoking status: Never     Passive exposure: Never    Smokeless tobacco: Never   Substance and Sexual Activity    Alcohol use: Yes     Comment: very rarely    Drug use: No    Sexual activity: Yes     Partners: Female     Social Determinants of Health     Financial Resource Strain: Medium Risk (1/29/2024)    Overall Financial Resource Strain (CARDIA)     Difficulty of Paying Living Expenses: Somewhat hard   Food Insecurity: No Food Insecurity (1/29/2024)    Hunger Vital Sign     Worried About Running Out of Food in the Last Year: Never true     Ran Out of Food in the Last Year: Never true   Transportation Needs: No Transportation Needs (1/29/2024)    PRAPARE - Transportation     Lack of Transportation (Medical): No     Lack of Transportation (Non-Medical): No   Physical Activity: Insufficiently Active (1/29/2024)    Exercise Vital Sign     Days of Exercise per Week: 3 days     Minutes of Exercise per  Session: 20 min   Stress: Stress Concern Present (1/29/2024)    [place] [employer]ational [employer] - Occupational Stress Questionnaire     Feeling of Stress : Very much   Housing Stability: Low Risk  (1/29/2024)    Housing Stability Vital Sign     Unable to Pay for Housing in the Last Year: No     Number of Places Lived in the Last Year: 1     Unstable Housing in the Last Year: No     Past Surgical History:   Procedure Laterality Date    EPIDURAL STEROID INJECTION INTO THORACIC SPINE N/A 3/24/2023    Procedure: INJECTION, STEROID, SPINE, THORACIC, EPIDURAL;  Surgeon: Scooter Norris MD;  Location: [facility];  Service: Pain Management;  Laterality: N/A;  T7-T8  Dr Sharp    EPIDURAL STEROID INJECTION INTO THORACIC SPINE N/A 7/25/2024    Procedure: Injection-steroid-epidural-thoracic;  Surgeon: Scooter Norris MD;  Location: [facility] OR;  Service: Pain Management;  Laterality: N/A;    LAPAROSCOPIC NISSEN FUNDOPLICATION       Family History   Problem Relation Name Age of Onset    Allergies Mother      Hypertension Father      Melanoma Neg Hx      Psoriasis Neg Hx      Eczema Neg Hx      Lupus Neg Hx       There were no vitals filed for this visit.    Review of Systems   Constitutional:  Negative for chills, diaphoresis, fatigue, fever and unexpected weight change.   HENT:  Negative for trouble swallowing.    Eyes:  Negative for visual disturbance.   Respiratory:  Negative for shortness of breath.    Cardiovascular:  Negative for chest pain.   Gastrointestinal:  Negative for abdominal pain, constipation, diarrhea, nausea and vomiting.   Genitourinary:  Negative for difficulty urinating.   Musculoskeletal:  Negative for arthralgias, back pain, gait problem, joint swelling, myalgias, neck pain and neck stiffness.   Neurological:  Negative for dizziness, speech difficulty, weakness, light-headedness, numbness and headaches.          Objective:      Physical Exam  Neurological:      Mental Status: He is alert and oriented to  person, place, and time.             Assessment:       1. Pain in thoracic spine           Plan:     Improved to his satisfaction status post interlaminar injection T7-8.  We can repeat that procedure as needed.  Follow up here as needed      Pain in thoracic spine

## 2024-09-03 NOTE — PROGRESS NOTES
SUBJECTIVE:      Patient ID: Swapnil Dillard is a 33 y.o. male.    Chief Complaint: Anxiety    Mr Dillard is here today to f/u on anxiety/depression, migraine h/a and gerd. Following with pain mgt for back pain.  He is doing well, GERD is improved with Protonix . He has started exercising again. He is complaining of hair thinning. He is using Rogaine otc    Anxiety  Presents for follow-up visit. Symptoms include depressed mood and excessive worry. Patient reports no chest pain, confusion, decreased concentration, dizziness, feeling of choking, irritability, nervous/anxious behavior, palpitations, panic, shortness of breath or suicidal ideas. Symptoms occur occasionally. The severity of symptoms is mild. The quality of sleep is good. Nighttime awakenings: occasional.     Compliance with medications is %.   Depression  Visit Type: follow-up  Patient presents with the following symptoms: depressed mood and excessive worry.  Patient is not experiencing: choking sensation, confusion, decreased concentration, feelings of hopelessness, feelings of worthlessness, irritability, nervousness/anxiety, palpitations, panic, shortness of breath, suicidal ideas, suicidal planning, thoughts of death and weight gain.  Frequency of symptoms: occasionally   Severity: mild   Sleep quality: good  Nighttime awakenings: occasional  Compliance with medications:  %        Past Surgical History:   Procedure Laterality Date    EPIDURAL STEROID INJECTION INTO THORACIC SPINE N/A 3/24/2023    Procedure: INJECTION, STEROID, SPINE, THORACIC, EPIDURAL;  Surgeon: Scooter Norris MD;  Location: [facility] OR;  Service: Pain Management;  Laterality: N/A;  T7-T8  Dr Sharp    EPIDURAL STEROID INJECTION INTO THORACIC SPINE N/A 7/25/2024    Procedure: Injection-steroid-epidural-thoracic;  Surgeon: Scooter Norris MD;  Location: [facility] OR;  Service: Pain Management;  Laterality: N/A;    LAPAROSCOPIC NISSEN FUNDOPLICATION       Family History   Problem  Relation Name Age of Onset    Allergies Mother      Hypertension Father      Melanoma Neg Hx      Psoriasis Neg Hx      Eczema Neg Hx      Lupus Neg Hx        Social History     Socioeconomic History    Marital status:      Spouse name: Chidi    Number of children: 0   Occupational History     Comment: [employer]   Tobacco Use    Smoking status: Never     Passive exposure: Never    Smokeless tobacco: Never   Substance and Sexual Activity    Alcohol use: Yes     Comment: very rarely    Drug use: No    Sexual activity: Yes     Partners: Female     Social Determinants of Health     Financial Resource Strain: Medium Risk (2024)    Overall Financial Resource Strain (CARDIA)     Difficulty of Paying Living Expenses: Somewhat hard   Food Insecurity: No Food Insecurity (2024)    Hunger Vital Sign     Worried About Running Out of Food in the Last Year: Never true     Ran Out of Food in the Last Year: Never true   Transportation Needs: No Transportation Needs (2024)    PRAPARE - Transportation     Lack of Transportation (Medical): No     Lack of Transportation (Non-Medical): No   Physical Activity: Insufficiently Active (2024)    Exercise Vital Sign     Days of Exercise per Week: 3 days     Minutes of Exercise per Session: 20 min   Stress: Stress Concern Present (2024)    [employer] [place] [employer] - Occupational Stress Questionnaire     Feeling of Stress : Very much   Housing Stability: Low Risk  (2024)    Housing Stability Vital Sign     Unable to Pay for Housing in the Last Year: No     Number of Places Lived in the Last Year: 1     Unstable Housing in the Last Year: No     Current Outpatient Medications   Medication Sig Dispense Refill    cyanocobalamin (VITAMIN B-12) 1000 MCG tablet Take 1,000 mcg by mouth.      EPINEPHrine (EPIPEN JR) 0.15 mg/0.3 mL pen injection SMARTSI IM Daily      famotidine (PEPCID) 20 MG tablet TAKE 1 TABLET BY MOUTH EVERY DAY IN  THE EVENING 30 tablet 0    naproxen (NAPROSYN) 500 MG tablet Take 1 tablet (500 mg total) by mouth daily as needed (migrain). 30 tablet 0    rizatriptan (MAXALT) 10 MG tablet Take 1 tablet (10 mg total) by mouth as needed for Migraine. 20 tablet 1    vitamin D (VITAMIN D3) 1000 units Tab Take 1,000 Units by mouth.      pantoprazole (PROTONIX) 40 MG tablet Take 1 tablet (40 mg total) by mouth once daily. 90 tablet 1    venlafaxine (EFFEXOR-XR) 75 MG 24 hr capsule Take 1 capsule (75 mg total) by mouth once daily. 90 capsule 1     Current Facility-Administered Medications   Medication Dose Route Frequency Provider Last Rate Last Admin    methylPREDNISolone acetate injection 40 mg  40 mg Intramuscular 1 time in Clinic/HOD Cuate Sharp MD        Tdap (BOOSTRIX) vaccine injection 0.5 mL  0.5 mL Intramuscular vaccine x 1 dose          Facility-Administered Medications Ordered in Other Visits   Medication Dose Route Frequency Provider Last Rate Last Admin    lactated ringers infusion   Intravenous Continuous Scooter Norris MD 25 mL/hr at 03/24/23 0835 New Bag at 03/24/23 0835     Review of patient's allergies indicates:   Allergen Reactions    Cat/feline products     House dust mite       Past Medical History:   Diagnosis Date    Allergy     Anxiety     Conjunctivitis     Corneal abrasion 2014    Depression     History of endoscopy 12/14/2018    Nissen fundoplication was formed. Wrap appears to be intact. Mild schatzki ring. Dilated. No gross lesions in the duodenal bulb and second part of the duodenum. Dr. Elmira Zaman    Migraine     ADRIAN (obstructive sleep apnea) 2010     Past Surgical History:   Procedure Laterality Date    EPIDURAL STEROID INJECTION INTO THORACIC SPINE N/A 3/24/2023    Procedure: INJECTION, STEROID, SPINE, THORACIC, EPIDURAL;  Surgeon: Scooter Norris MD;  [facility]  Service: Pain Management;  Laterality: N/A;  T7-T8  Dr Sharp    EPIDURAL STEROID INJECTION INTO THORACIC SPINE N/A  "7/25/2024    Procedure: Injection-steroid-epidural-thoracic;  Surgeon: Scooter Norris MD;  Location: [facility] OR;  Service: Pain Management;  Laterality: N/A;    LAPAROSCOPIC NISSEN FUNDOPLICATION         Review of Systems   Constitutional:  Positive for unexpected weight change. Negative for activity change, appetite change, chills, diaphoresis, irritability and weight gain.   HENT:  Negative for ear discharge, facial swelling, hearing loss, nosebleeds, rhinorrhea and trouble swallowing.    Eyes:  Negative for photophobia, pain, discharge and visual disturbance.   Respiratory:  Negative for apnea, choking, chest tightness, shortness of breath and wheezing.    Cardiovascular:  Negative for chest pain and palpitations.   Gastrointestinal:  Negative for abdominal pain, blood in stool, constipation, diarrhea and vomiting.   Endocrine: Negative for polydipsia, polyphagia and polyuria.        Hair thinning     Genitourinary:  Negative for difficulty urinating, hematuria and urgency.   Musculoskeletal:  Negative for arthralgias, gait problem, joint swelling and neck pain.   Skin:  Negative for pallor.   Neurological:  Negative for dizziness, seizures, speech difficulty, weakness and headaches.   Hematological:  Does not bruise/bleed easily.   Psychiatric/Behavioral:  Positive for depression. Negative for agitation, confusion, decreased concentration, dysphoric mood, self-injury, sleep disturbance and suicidal ideas. The patient is not nervous/anxious.       OBJECTIVE:      Vitals:    09/04/24 0731   BP: (P) 110/70   Pulse: 93   SpO2: 96%   Weight: 116.6 kg (257 lb)   Height: 5' 10" (1.778 m)     Physical Exam  Vitals and nursing note reviewed.   Constitutional:       General: He is not in acute distress.     Appearance: He is well-developed.   HENT:      Head: Normocephalic and atraumatic.      Nose: Nose normal.      Mouth/Throat:      Pharynx: Uvula midline.   Eyes:      General: Lids are normal.      Conjunctiva/sclera: " Conjunctivae normal.      Pupils: Pupils are equal, round, and reactive to light.      Right eye: Pupil is round and reactive.      Left eye: Pupil is round and reactive.   Neck:      Thyroid: No thyromegaly.      Vascular: No carotid bruit.   Cardiovascular:      Rate and Rhythm: Normal rate and regular rhythm.      Pulses: Normal pulses.      Heart sounds: Normal heart sounds. No murmur heard.  Pulmonary:      Effort: Pulmonary effort is normal.      Breath sounds: Normal breath sounds. No wheezing, rhonchi or rales.   Abdominal:      General: Bowel sounds are normal.      Palpations: Abdomen is soft. Abdomen is not rigid.      Tenderness: There is no abdominal tenderness.   Musculoskeletal:         General: Normal range of motion.      Cervical back: Normal range of motion and neck supple.      Right lower leg: No edema.      Left lower leg: No edema.   Lymphadenopathy:      Cervical: No cervical adenopathy.   Skin:     General: Skin is warm and dry.      Nails: There is no clubbing.   Neurological:      Mental Status: He is alert and oriented to person, place, and time.   Psychiatric:         Mood and Affect: Mood normal.         Speech: Speech normal.         Behavior: Behavior normal. Behavior is cooperative.         Thought Content: Thought content normal.         Judgment: Judgment normal.        Last visit note, most recent available labs, and health maintenance reviewed    No visits with results within 1 Month(s) from this visit.   Latest known visit with results is:   Telephone on 02/14/2024   Component Date Value Ref Range Status    WBC 02/21/2024 6.8  3.8 - 10.8 Thousand/uL Final    RBC 02/21/2024 4.79  4.20 - 5.80 Million/uL Final    Hemoglobin 02/21/2024 14.6  13.2 - 17.1 g/dL Final    Hematocrit 02/21/2024 43.8  38.5 - 50.0 % Final    MCV 02/21/2024 91.4  80.0 - 100.0 fL Final    MCH 02/21/2024 30.5  27.0 - 33.0 pg Final    MCHC 02/21/2024 33.3  32.0 - 36.0 g/dL Final    RDW 02/21/2024 12.2  11.0 -  15.0 % Final    Platelets 02/21/2024 321  140 - 400 Thousand/uL Final    MPV 02/21/2024 10.5  7.5 - 12.5 fL Final    Neutrophils, Abs 02/21/2024 3,910  1,500 - 7,800 cells/uL Final    Lymph # 02/21/2024 2,054  850 - 3,900 cells/uL Final    Mono # 02/21/2024 660  200 - 950 cells/uL Final    Eos # 02/21/2024 129  15 - 500 cells/uL Final    Baso # 02/21/2024 48  0 - 200 cells/uL Final    Neutrophils Relative 02/21/2024 57.5  % Final    Lymph % 02/21/2024 30.2  % Final    Mono % 02/21/2024 9.7  % Final    Eosinophil % 02/21/2024 1.9  % Final    Basophil % 02/21/2024 0.7  % Final    Glucose 02/21/2024 99  65 - 99 mg/dL Final    Comment:               Fasting reference interval         BUN 02/21/2024 13  7 - 25 mg/dL Final    Creatinine 02/21/2024 1.19  0.60 - 1.26 mg/dL Final    eGFR 02/21/2024 82  > OR = 60 mL/min/1.73m2 Final    BUN/Creatinine Ratio 02/21/2024 SEE NOTE:  6 - 22 (calc) Final    Comment:    Not Reported: BUN and Creatinine are within     reference range.            Sodium 02/21/2024 142  135 - 146 mmol/L Final    Potassium 02/21/2024 4.1  3.5 - 5.3 mmol/L Final    Chloride 02/21/2024 105  98 - 110 mmol/L Final    CO2 02/21/2024 28  20 - 32 mmol/L Final    Calcium 02/21/2024 9.3  8.6 - 10.3 mg/dL Final    Total Protein 02/21/2024 6.6  6.1 - 8.1 g/dL Final    Albumin 02/21/2024 4.0  3.6 - 5.1 g/dL Final    Globulin, Total 02/21/2024 2.6  1.9 - 3.7 g/dL (calc) Final    Albumin/Globulin Ratio 02/21/2024 1.5  1.0 - 2.5 (calc) Final    Total Bilirubin 02/21/2024 0.4  0.2 - 1.2 mg/dL Final    Alkaline Phosphatase 02/21/2024 55  36 - 130 U/L Final    AST 02/21/2024 19  10 - 40 U/L Final    ALT 02/21/2024 25  9 - 46 U/L Final    Cholesterol 02/21/2024 231 (H)  <200 mg/dL Final    HDL 02/21/2024 49  > OR = 40 mg/dL Final    Triglycerides 02/21/2024 187 (H)  <150 mg/dL Final    LDL Cholesterol 02/21/2024 150 (H)  mg/dL (calc) Final    Comment: Reference range: <100     Desirable range <100 mg/dL for primary  prevention;    <70 mg/dL for patients with CHD or diabetic patients   with > or = 2 CHD risk factors.     LDL-C is now calculated using the Zee   calculation, which is a validated novel method providing   better accuracy than the Friedewald equation in the   estimation of LDL-C.   Hair DEMPSEY et al. NEPTALI. 2013;310(19): 2689-6954   (http://[employer]/faq/YGF687)      HDL/Cholesterol Ratio 02/21/2024 4.7  <5.0 (calc) Final    Non HDL Chol. (LDL+VLDL) 02/21/2024 182 (H)  <130 mg/dL (calc) Final    Comment: For patients with diabetes plus 1 major ASCVD risk   factor, treating to a non-HDL-C goal of <100 mg/dL   (LDL-C of <70 mg/dL) is considered a therapeutic   option.      TSH 02/21/2024 1.71  0.40 - 4.50 mIU/L Final    Color, UA 02/21/2024 YELLOW  YELLOW Final    Appearance, UA 02/21/2024 CLEAR  CLEAR Final    Specific Gravity, UA 02/21/2024 1.018  1.001 - 1.035 Final    pH, UA 02/21/2024 5.5  5.0 - 8.0 Final    Glucose, UA 02/21/2024 NEGATIVE  NEGATIVE Final    Bilirubin, UA 02/21/2024 NEGATIVE  NEGATIVE Final    Ketones, UA 02/21/2024 NEGATIVE  NEGATIVE Final    Occult Blood UA 02/21/2024 NEGATIVE  NEGATIVE Final    Protein, UA 02/21/2024 NEGATIVE  NEGATIVE Final    Nitrite, UA 02/21/2024 NEGATIVE  NEGATIVE Final    Leukocytes, UA 02/21/2024 NEGATIVE  NEGATIVE Final    Vitamin D, 25-OH, Total 02/21/2024 47  30 - 100 ng/mL Final    Comment: Vitamin D Status         25-OH Vitamin D:     Deficiency:                    <20 ng/mL  Insufficiency:             20 - 29 ng/mL  Optimal:                 > or = 30 ng/mL     For 25-OH Vitamin D testing on patients on   D2-supplementation and patients for whom quantitation   of D2 and D3 fractions is required, the EquifaxureD(TM)  25-OH VIT D, (D2,D3), LC/MS/MS is recommended: order   code 01679 (patients >2yrs).     See Note 1     Note 1     For additional information, please refer to   http://education.[employer]/faq/HPJ102   (This link is  being provided for informational/  educational purposes only.)      Vitamin B-12 02/21/2024 523  200 - 1,100 pg/mL Final   ]  Assessment:       1. Depression with anxiety    2. Elevated LDL cholesterol level    3. Gastroesophageal reflux disease, unspecified whether esophagitis present    4. Other migraine without status migrainosus, not intractable    5. Hair loss    6. Need for diphtheria, tetanus, pertussis, and Hib vaccination        Plan:       Depression with anxiety  -     venlafaxine (EFFEXOR-XR) 75 MG 24 hr capsule; Take 1 capsule (75 mg total) by mouth once daily.  Dispense: 90 capsule; Refill: 1    Elevated LDL cholesterol level  I recommend high fiber, low fat diet, daily metamucil supplement and daily aerobic exercise    Gastroesophageal reflux disease, unspecified whether esophagitis present  -     pantoprazole (PROTONIX) 40 MG tablet; Take 1 tablet (40 mg total) by mouth once daily.  Dispense: 90 tablet; Refill: 1    Other migraine without status migrainosus, not intractable  Stable on current meds    Hair loss  -     Ambulatory referral/consult to Dermatology; Future; Expected date: 09/04/2024    Need for diphtheria, tetanus, pertussis, and Hib vaccination  -     Tdap (BOOSTRIX) vaccine injection 0.5 mL        Follow up in about 6 months (around 3/4/2025) for wellness .      9/4/2024 TOBIN Combs, MURIELP

## 2024-09-04 ENCOUNTER — OFFICE VISIT (OUTPATIENT)
Dept: FAMILY MEDICINE | Facility: CLINIC | Age: 34
End: 2024-09-04
Payer: COMMERCIAL

## 2024-09-04 VITALS — HEIGHT: 70 IN | HEART RATE: 93 BPM | WEIGHT: 257 LBS | OXYGEN SATURATION: 96 % | BODY MASS INDEX: 36.79 KG/M2

## 2024-09-04 DIAGNOSIS — G43.809 OTHER MIGRAINE WITHOUT STATUS MIGRAINOSUS, NOT INTRACTABLE: ICD-10-CM

## 2024-09-04 DIAGNOSIS — K21.9 GASTROESOPHAGEAL REFLUX DISEASE, UNSPECIFIED WHETHER ESOPHAGITIS PRESENT: ICD-10-CM

## 2024-09-04 DIAGNOSIS — Z23 NEED FOR DIPHTHERIA, TETANUS, PERTUSSIS, AND HIB VACCINATION: ICD-10-CM

## 2024-09-04 DIAGNOSIS — L65.9 HAIR LOSS: ICD-10-CM

## 2024-09-04 DIAGNOSIS — F41.8 DEPRESSION WITH ANXIETY: Primary | ICD-10-CM

## 2024-09-04 DIAGNOSIS — E78.00 ELEVATED LDL CHOLESTEROL LEVEL: ICD-10-CM

## 2024-09-04 PROCEDURE — 3008F BODY MASS INDEX DOCD: CPT | Mod: CPTII,S$GLB,, | Performed by: NURSE PRACTITIONER

## 2024-09-04 PROCEDURE — 90715 TDAP VACCINE 7 YRS/> IM: CPT | Mod: S$GLB,,, | Performed by: NURSE PRACTITIONER

## 2024-09-04 PROCEDURE — 90471 IMMUNIZATION ADMIN: CPT | Mod: S$GLB,,, | Performed by: NURSE PRACTITIONER

## 2024-09-04 PROCEDURE — 99214 OFFICE O/P EST MOD 30 MIN: CPT | Mod: 25,S$GLB,, | Performed by: NURSE PRACTITIONER

## 2024-09-04 PROCEDURE — 1160F RVW MEDS BY RX/DR IN RCRD: CPT | Mod: CPTII,S$GLB,, | Performed by: NURSE PRACTITIONER

## 2024-09-04 PROCEDURE — 1159F MED LIST DOCD IN RCRD: CPT | Mod: CPTII,S$GLB,, | Performed by: NURSE PRACTITIONER

## 2024-09-04 RX ORDER — PANTOPRAZOLE SODIUM 40 MG/1
40 TABLET, DELAYED RELEASE ORAL DAILY
Qty: 90 TABLET | Refills: 1 | Status: SHIPPED | OUTPATIENT
Start: 2024-09-04

## 2024-09-04 RX ORDER — VENLAFAXINE HYDROCHLORIDE 75 MG/1
75 CAPSULE, EXTENDED RELEASE ORAL DAILY
Qty: 90 CAPSULE | Refills: 1 | Status: SHIPPED | OUTPATIENT
Start: 2024-09-04

## 2024-10-29 ENCOUNTER — OFFICE VISIT (OUTPATIENT)
Dept: PULMONOLOGY | Facility: CLINIC | Age: 34
End: 2024-10-29
Payer: COMMERCIAL

## 2024-10-29 VITALS
HEIGHT: 70 IN | SYSTOLIC BLOOD PRESSURE: 118 MMHG | DIASTOLIC BLOOD PRESSURE: 72 MMHG | BODY MASS INDEX: 37.39 KG/M2 | TEMPERATURE: 98 F | HEART RATE: 94 BPM | WEIGHT: 261.19 LBS | OXYGEN SATURATION: 95 %

## 2024-10-29 DIAGNOSIS — G47.33 OSA (OBSTRUCTIVE SLEEP APNEA): Primary | ICD-10-CM

## 2024-10-29 PROCEDURE — 1159F MED LIST DOCD IN RCRD: CPT | Mod: CPTII,S$GLB,, | Performed by: NURSE PRACTITIONER

## 2024-10-29 PROCEDURE — 99203 OFFICE O/P NEW LOW 30 MIN: CPT | Mod: S$GLB,,, | Performed by: NURSE PRACTITIONER

## 2024-10-29 PROCEDURE — 3078F DIAST BP <80 MM HG: CPT | Mod: CPTII,S$GLB,, | Performed by: NURSE PRACTITIONER

## 2024-10-29 PROCEDURE — 99999 PR PBB SHADOW E&M-EST. PATIENT-LVL III: CPT | Mod: PBBFAC,,, | Performed by: NURSE PRACTITIONER

## 2024-10-29 PROCEDURE — 3074F SYST BP LT 130 MM HG: CPT | Mod: CPTII,S$GLB,, | Performed by: NURSE PRACTITIONER

## 2024-10-29 PROCEDURE — 3008F BODY MASS INDEX DOCD: CPT | Mod: CPTII,S$GLB,, | Performed by: NURSE PRACTITIONER

## 2024-12-30 ENCOUNTER — E-VISIT (OUTPATIENT)
Dept: FAMILY MEDICINE | Facility: CLINIC | Age: 34
End: 2024-12-30
Payer: COMMERCIAL

## 2024-12-30 DIAGNOSIS — F41.0 PANIC ATTACK: Primary | ICD-10-CM

## 2024-12-30 RX ORDER — ALPRAZOLAM 0.5 MG/1
0.5 TABLET ORAL DAILY PRN
Qty: 14 TABLET | Refills: 0 | Status: SHIPPED | OUTPATIENT
Start: 2024-12-30 | End: 2025-01-29

## 2024-12-30 NOTE — PROGRESS NOTES
Patient ID: Swapnil Dillard is a 34 y.o. male.    Chief Complaint: General Illness (Entered automatically based on patient selection in [employer].)    The patient initiated a request through [employer] on 12/30/2024 for evaluation and management with a chief complaint of General Illness (Entered automatically based on patient selection in [employer].)    Pt inquiring about medication to help with anxiety related to flying    I evaluated the questionnaire submission on 12/30/24.    [facility] P[facility] Evisit [facility]-Medication    12/30/2024  9:38 AM CST - Filed by Patient   What do you need help with? Travel Concerns   Do you agree to participate in an E-Visit? Yes   If you have any of the following symptoms, please present to your local emergency room or call 911: I acknowledge   What is the main issue you would like addressed today? I have to fly across the country next week and I do not like to fly. I am actively having panoc attacks about it and was looking for something to help.   I would like to address: Medication for Travel Concerns   What countries and cities are you visiting? [place]   What is the purpose of your trip? Visiting family or friends   How many days is your trip? 7   Do you plan to do any of these activities during your trip? None   Where will you stay during your trip? Hotel   Do you need a medication for any of the following? Anxiety;  Motion sickness   Have you gotten any vaccines from a pharmacy or outside clinic? No   Would you like vaccine recommendations for your trip? No   Please upload any travel documents, accommodations, or forms for your provider to complete.     Provide any additional information you feel is important.    Please attach any relevant images or files    Are you able to take your vital signs? No   Do you want to address a new or existing medication? I would like to start a new medication that I do not already take   What is the name of the medication that you would like to start?  Whatever you recommend.   Have you taken a similar medication in the past? Yes   What was the name of the similar medication? Whatever is recommended. I am not having a good time here.   Why are you no longer on that medication? No specific reason    What medical condition is the  medication intended to treat? Anxiety?   Provide any additional information you feel is important.    Please attach any relevant images or files    Are you able to take your vital signs? No         Encounter Diagnosis   Name Primary?    Panic attack Yes        No orders of the defined types were placed in this encounter.     Medications Ordered This Encounter   Medications    ALPRAZolam (XANAX) 0.5 MG tablet     Sig: Take 1 tablet (0.5 mg total) by mouth daily as needed for Anxiety.     Dispense:  14 tablet     Refill:  0      Xanax sent to take prn for panic. Cont effexor as prescribed  Follow up if symptoms worsen or fail to improve, for has f/u .      E-Visit Time Trackin minutes spent on this encounter

## 2025-02-22 NOTE — TELEPHONE ENCOUNTER
----- Message from Bree Hobson LPN sent at 6/15/2023  4:34 PM CDT -----  Regarding: FW: Med change    ----- Message -----  From: Lucy Murcia  Sent: 6/15/2023   4:31 PM CDT  To: R[facility] Foster Staff  Subject: Med change                                       Patient stated that his insurance will not pay for his Vibryd 20 mg, so they need us to  send the generic med for Effexor or Paxil. Patient would like a call back when and what was sent       Pt reported alcohol use, claimed not to have any problem with her alcohol intake and declined active referral to treatment when offered. Emotional support, SA resources and psycho education re alcohol offered.   Pt verbalized understanding.

## 2025-02-25 ENCOUNTER — TELEPHONE (OUTPATIENT)
Dept: FAMILY MEDICINE | Facility: CLINIC | Age: 35
End: 2025-02-25
Payer: COMMERCIAL

## 2025-02-25 DIAGNOSIS — Z00.00 PREVENTATIVE HEALTH CARE: ICD-10-CM

## 2025-02-25 DIAGNOSIS — E55.9 VITAMIN D DEFICIENCY: ICD-10-CM

## 2025-02-25 DIAGNOSIS — E78.00 ELEVATED LDL CHOLESTEROL LEVEL: Primary | ICD-10-CM

## 2025-02-25 DIAGNOSIS — E53.8 VITAMIN B 12 DEFICIENCY: ICD-10-CM

## 2025-02-27 ENCOUNTER — PATIENT MESSAGE (OUTPATIENT)
Dept: FAMILY MEDICINE | Facility: CLINIC | Age: 35
End: 2025-02-27
Payer: COMMERCIAL

## 2025-02-28 ENCOUNTER — RESULTS FOLLOW-UP (OUTPATIENT)
Dept: FAMILY MEDICINE | Facility: CLINIC | Age: 35
End: 2025-02-28
Payer: COMMERCIAL

## 2025-02-28 LAB
25(OH)D3+25(OH)D2 SERPL-MCNC: 39 NG/ML (ref 30–100)
ALBUMIN SERPL-MCNC: 4.3 G/DL (ref 3.6–5.1)
ALBUMIN/GLOB SERPL: 1.6 (CALC) (ref 1–2.5)
ALP SERPL-CCNC: 69 U/L (ref 36–130)
ALT SERPL-CCNC: 23 U/L (ref 9–46)
APPEARANCE UR: CLEAR
AST SERPL-CCNC: 18 U/L (ref 10–40)
BACTERIA #/AREA URNS HPF: NORMAL /HPF
BACTERIA UR CULT: NORMAL
BASOPHILS # BLD AUTO: 69 CELLS/UL (ref 0–200)
BASOPHILS NFR BLD AUTO: 0.9 %
BILIRUB SERPL-MCNC: 0.4 MG/DL (ref 0.2–1.2)
BILIRUB UR QL STRIP: NEGATIVE
BUN SERPL-MCNC: 10 MG/DL (ref 7–25)
BUN/CREAT SERPL: NORMAL (CALC) (ref 6–22)
CALCIUM SERPL-MCNC: 9.2 MG/DL (ref 8.6–10.3)
CHLORIDE SERPL-SCNC: 102 MMOL/L (ref 98–110)
CHOLEST SERPL-MCNC: 239 MG/DL
CHOLEST/HDLC SERPL: 4.9 (CALC)
CO2 SERPL-SCNC: 28 MMOL/L (ref 20–32)
COLOR UR: YELLOW
CREAT SERPL-MCNC: 1.02 MG/DL (ref 0.6–1.26)
EGFR: 98 ML/MIN/1.73M2
EOSINOPHIL # BLD AUTO: 162 CELLS/UL (ref 15–500)
EOSINOPHIL NFR BLD AUTO: 2.1 %
ERYTHROCYTE [DISTWIDTH] IN BLOOD BY AUTOMATED COUNT: 12.4 % (ref 11–15)
GLOBULIN SER CALC-MCNC: 2.7 G/DL (CALC) (ref 1.9–3.7)
GLUCOSE SERPL-MCNC: 93 MG/DL (ref 65–139)
GLUCOSE UR QL STRIP: NEGATIVE
HCT VFR BLD AUTO: 41.2 % (ref 38.5–50)
HDLC SERPL-MCNC: 49 MG/DL
HGB BLD-MCNC: 13.9 G/DL (ref 13.2–17.1)
HGB UR QL STRIP: NEGATIVE
HYALINE CASTS #/AREA URNS LPF: NORMAL /LPF
KETONES UR QL STRIP: NEGATIVE
LDLC SERPL CALC-MCNC: 152 MG/DL (CALC)
LEUKOCYTE ESTERASE UR QL STRIP: NEGATIVE
LYMPHOCYTES # BLD AUTO: 2911 CELLS/UL (ref 850–3900)
LYMPHOCYTES NFR BLD AUTO: 37.8 %
MCH RBC QN AUTO: 30.5 PG (ref 27–33)
MCHC RBC AUTO-ENTMCNC: 33.7 G/DL (ref 32–36)
MCV RBC AUTO: 90.4 FL (ref 80–100)
MONOCYTES # BLD AUTO: 747 CELLS/UL (ref 200–950)
MONOCYTES NFR BLD AUTO: 9.7 %
NEUTROPHILS # BLD AUTO: 3812 CELLS/UL (ref 1500–7800)
NEUTROPHILS NFR BLD AUTO: 49.5 %
NITRITE UR QL STRIP: NEGATIVE
NONHDLC SERPL-MCNC: 190 MG/DL (CALC)
PH UR STRIP: 6 [PH] (ref 5–8)
PLATELET # BLD AUTO: 332 THOUSAND/UL (ref 140–400)
PMV BLD REES-ECKER: 10.5 FL (ref 7.5–12.5)
POTASSIUM SERPL-SCNC: 3.9 MMOL/L (ref 3.5–5.3)
PROT SERPL-MCNC: 7 G/DL (ref 6.1–8.1)
PROT UR QL STRIP: NEGATIVE
RBC # BLD AUTO: 4.56 MILLION/UL (ref 4.2–5.8)
RBC #/AREA URNS HPF: NORMAL /HPF
SERVICE CMNT-IMP: NORMAL
SODIUM SERPL-SCNC: 141 MMOL/L (ref 135–146)
SP GR UR STRIP: 1.02 (ref 1–1.03)
SQUAMOUS #/AREA URNS HPF: NORMAL /HPF
TRIGL SERPL-MCNC: 227 MG/DL
TSH SERPL-ACNC: 1.51 MIU/L (ref 0.4–4.5)
VIT B12 SERPL-MCNC: 566 PG/ML (ref 200–1100)
WBC # BLD AUTO: 7.7 THOUSAND/UL (ref 3.8–10.8)
WBC #/AREA URNS HPF: NORMAL /HPF

## 2025-03-05 NOTE — PROGRESS NOTES
SUBJECTIVE:      Patient ID: Swapnil Dillard is a 34 y.o. male.    Chief Complaint: Annual Exam    HPI  History of Present Illness    CHIEF COMPLAINT:  Swapnil presents today for an annual exam    LABS:  Non-fasting cholesterol results show elevated levels with total cholesterol of 239, HDL of 49, and LDL of 152. Other lab results, including kidney function, liver function, electrolytes, and blood counts, were within normal limits. Vitamin levels were adequate with Vitamin D at 39 and B12 at 566. Thyroid function tests were normal.    WEIGHT MANAGEMENT AND EXERCISE:  He reports weight gain with current weight of 265 lbs. He attempts to exercise but experiences foot pain limiting cardio activities and back problems restricting weight training capabilities.    MEDICAL CONDITIONS:  He has sleep apnea managed with CPAP machine.    CURRENT MEDICATIONS:  He takes Effexor 75 mg, Protonix, Maxalt as needed for headaches, and Minoxidil prescribed by Dr. Mark.         Past Surgical History:   Procedure Laterality Date    EPIDURAL STEROID INJECTION INTO THORACIC SPINE N/A 3/24/2023    Procedure: INJECTION, STEROID, SPINE, THORACIC, EPIDURAL;  Surgeon: Scooter Norris MD;  Location: [facility] OR;  Service: Pain Management;  Laterality: N/A;  T7-T8  Dr Sharp    EPIDURAL STEROID INJECTION INTO THORACIC SPINE N/A 7/25/2024    Procedure: Injection-steroid-epidural-thoracic;  Surgeon: Scooter Norris MD;  Location: [facility] OR;  Service: Pain Management;  Laterality: N/A;    LAPAROSCOPIC NISSEN FUNDOPLICATION       Family History   Problem Relation Name Age of Onset    Allergies Mother      Hypertension Father      Melanoma Neg Hx      Psoriasis Neg Hx      Eczema Neg Hx      Lupus Neg Hx        Social History     Socioeconomic History    Marital status:      Spouse name: Chidi    Number of children: 0   Occupational History     Comment: [employer] go[employer]   Tobacco Use    Smoking status: Never     Passive exposure: Never     Smokeless tobacco: Never   Substance and Sexual Activity    Alcohol use: Yes     Comment: very rarely    Drug use: No    Sexual activity: Yes     Partners: Female     Social Drivers of Health     Financial Resource Strain: Medium Risk (1/29/2024)    Overall Financial Resource Strain (CARDIA)     Difficulty of Paying Living Expenses: Somewhat hard   Food Insecurity: No Food Insecurity (1/29/2024)    Hunger Vital Sign     Worried About Running Out of Food in the Last Year: Never true     Ran Out of Food in the Last Year: Never true   Transportation Needs: No Transportation Needs (1/29/2024)    PRAPARE - Transportation     Lack of Transportation (Medical): No     Lack of Transportation (Non-Medical): No   Physical Activity: Insufficiently Active (1/29/2024)    Exercise Vital Sign     Days of Exercise per Week: 3 days     Minutes of Exercise per Session: 20 min   Stress: Stress Concern Present (1/29/2024)    [employer] [place] [employer] - Occupational Stress Questionnaire     Feeling of Stress : Very much   Housing Stability: Low Risk  (1/29/2024)    Housing Stability Vital Sign     Unable to Pay for Housing in the Last Year: No     Number of Places Lived in the Last Year: 1     Unstable Housing in the Last Year: No     Current Medications[1]  Review of patient's allergies indicates:   Allergen Reactions    Cat/feline products     House dust mite       Past Medical History:   Diagnosis Date    Allergy     Anxiety     Conjunctivitis     Corneal abrasion 2014    Depression     History of endoscopy 12/14/2018    Nissen fundoplication was formed. Wrap appears to be intact. Mild schatzki ring. Dilated. No gross lesions in the duodenal bulb and second part of the duodenum. Dr. Elmira Zaman    Migraine     ADRIAN (obstructive sleep apnea) 2010     Past Surgical History:   Procedure Laterality Date    EPIDURAL STEROID INJECTION INTO THORACIC SPINE N/A 3/24/2023    Procedure: INJECTION, STEROID, SPINE, THORACIC,  "EPIDURAL;  Surgeon: Scooter Norris MD;  Location[facility] OR;  Service: Pain Management;  Laterality: N/A;  T7-T8  Dr Sharp    EPIDURAL STEROID INJECTION INTO THORACIC SPINE N/A 7/25/2024    Procedure: Injection-steroid-epidural-thoracic;  Surgeon: Scooter Norris MD;  Location: [facility] OR;  Service: Pain Management;  Laterality: N/A;    LAPAROSCOPIC NISSEN FUNDOPLICATION         Review of Systems   Constitutional:  Negative for appetite change, chills, diaphoresis and unexpected weight change.   HENT:  Negative for ear discharge, facial swelling, hearing loss, nosebleeds and trouble swallowing.    Eyes:  Negative for photophobia, pain and visual disturbance.   Respiratory:  Negative for apnea, choking, shortness of breath and wheezing.    Cardiovascular:  Negative for chest pain and palpitations.   Gastrointestinal:  Negative for abdominal pain, blood in stool and vomiting.   Endocrine: Negative for polyphagia.   Genitourinary:  Negative for difficulty urinating and hematuria.   Musculoskeletal:  Negative for gait problem and joint swelling.   Skin:  Negative for pallor.   Neurological:  Negative for dizziness, seizures, speech difficulty, weakness, light-headedness and headaches.   Hematological:  Does not bruise/bleed easily.   Psychiatric/Behavioral:  Negative for agitation, confusion, dysphoric mood, self-injury, sleep disturbance and suicidal ideas. The patient is not nervous/anxious.       OBJECTIVE:      Vitals:    03/06/25 0752   BP: 108/70   Pulse: 82   SpO2: 97%   Weight: 120.2 kg (265 lb)   Height: 5' 10" (1.778 m)     Physical Exam  Vitals and nursing note reviewed.   Constitutional:       General: He is not in acute distress.     Appearance: He is well-developed.   HENT:      Head: Normocephalic and atraumatic.      Nose: Nose normal.      Mouth/Throat:      Pharynx: Uvula midline.   Eyes:      General: Lids are normal.      Conjunctiva/sclera: Conjunctivae normal.      Pupils: Pupils are equal, round, and " reactive to light.      Right eye: Pupil is round and reactive.      Left eye: Pupil is round and reactive.   Neck:      Thyroid: No thyromegaly.      Vascular: No carotid bruit.   Cardiovascular:      Rate and Rhythm: Normal rate and regular rhythm.      Pulses: Normal pulses.      Heart sounds: Normal heart sounds. No murmur heard.  Pulmonary:      Effort: Pulmonary effort is normal.      Breath sounds: Normal breath sounds. No wheezing, rhonchi or rales.   Abdominal:      General: Bowel sounds are normal.      Palpations: Abdomen is soft. Abdomen is not rigid.      Tenderness: There is no abdominal tenderness.   Musculoskeletal:         General: Normal range of motion.      Cervical back: Normal range of motion and neck supple.      Right lower leg: No edema.      Left lower leg: No edema.   Lymphadenopathy:      Cervical: No cervical adenopathy.   Skin:     General: Skin is warm and dry.      Nails: There is no clubbing.   Neurological:      Mental Status: He is alert and oriented to person, place, and time.   Psychiatric:         Mood and Affect: Mood normal.         Speech: Speech normal.         Behavior: Behavior normal. Behavior is cooperative.         Thought Content: Thought content normal.         Judgment: Judgment normal.       Telephone on 02/25/2025   Component Date Value Ref Range Status    WBC 02/27/2025 7.7  3.8 - 10.8 Thousand/uL Final    RBC 02/27/2025 4.56  4.20 - 5.80 Million/uL Final    Hemoglobin 02/27/2025 13.9  13.2 - 17.1 g/dL Final    Hematocrit 02/27/2025 41.2  38.5 - 50.0 % Final    MCV 02/27/2025 90.4  80.0 - 100.0 fL Final    MCH 02/27/2025 30.5  27.0 - 33.0 pg Final    MCHC 02/27/2025 33.7  32.0 - 36.0 g/dL Final    Comment: For adults, a slight decrease in the calculated MCHC  value (in the range of 30 to 32 g/dL) is most likely  not clinically significant; however, it should be  interpreted with caution in correlation with other  red cell parameters and the patient's  clinical  condition.      RDW 02/27/2025 12.4  11.0 - 15.0 % Final    Platelets 02/27/2025 332  140 - 400 Thousand/uL Final    MPV 02/27/2025 10.5  7.5 - 12.5 fL Final    Neutrophils, Abs 02/27/2025 3,812  1,500 - 7,800 cells/uL Final    Lymph # 02/27/2025 2,911  850 - 3,900 cells/uL Final    Mono # 02/27/2025 747  200 - 950 cells/uL Final    Eos # 02/27/2025 162  15 - 500 cells/uL Final    Baso # 02/27/2025 69  0 - 200 cells/uL Final    Neutrophils Relative 02/27/2025 49.5  % Final    Lymph % 02/27/2025 37.8  % Final    Mono % 02/27/2025 9.7  % Final    Eosinophil % 02/27/2025 2.1  % Final    Basophil % 02/27/2025 0.9  % Final    Glucose 02/27/2025 93  65 - 139 mg/dL Final    Comment:           Non-fasting reference interval         BUN 02/27/2025 10  7 - 25 mg/dL Final    Creatinine 02/27/2025 1.02  0.60 - 1.26 mg/dL Final    eGFR 02/27/2025 98  > OR = 60 mL/min/1.73m2 Final    BUN/Creatinine Ratio 02/27/2025 SEE NOTE:  6 - 22 (calc) Final    Comment:    Not Reported: BUN and Creatinine are within     reference range.            Sodium 02/27/2025 141  135 - 146 mmol/L Final    Potassium 02/27/2025 3.9  3.5 - 5.3 mmol/L Final    Chloride 02/27/2025 102  98 - 110 mmol/L Final    CO2 02/27/2025 28  20 - 32 mmol/L Final    Calcium 02/27/2025 9.2  8.6 - 10.3 mg/dL Final    Total Protein 02/27/2025 7.0  6.1 - 8.1 g/dL Final    Albumin 02/27/2025 4.3  3.6 - 5.1 g/dL Final    Globulin, Total 02/27/2025 2.7  1.9 - 3.7 g/dL (calc) Final    Albumin/Globulin Ratio 02/27/2025 1.6  1.0 - 2.5 (calc) Final    Total Bilirubin 02/27/2025 0.4  0.2 - 1.2 mg/dL Final    Alkaline Phosphatase 02/27/2025 69  36 - 130 U/L Final    AST 02/27/2025 18  10 - 40 U/L Final    ALT 02/27/2025 23  9 - 46 U/L Final    Cholesterol 02/27/2025 239 (H)  <200 mg/dL Final    HDL 02/27/2025 49  > OR = 40 mg/dL Final    Triglycerides 02/27/2025 227 (H)  <150 mg/dL Final    Comment:    If a non-fasting specimen was collected, consider  repeat triglyceride  testing on a fasting specimen  if clinically indicated.   Palak et al. J. of Clin. Lipidol. 2015;9:129-169.         LDL Cholesterol 02/27/2025 152 (H)  mg/dL (calc) Final    Comment: Reference range: <100     Desirable range <100 mg/dL for primary prevention;    <70 mg/dL for patients with CHD or diabetic patients   with > or = 2 CHD risk factors.     LDL-C is now calculated using the Hair-Mueller   calculation, which is a validated novel method providing   better accuracy than the Friedewald equation in the   estimation of LDL-C.   Hair DEMPSEY et al. NEPTALI. 2013;310(19): 4153-9286   (http://[employer].BlaBlaCar/faq/XVJ121)      HDL/Cholesterol Ratio 02/27/2025 4.9  <5.0 (calc) Final    Non HDL Chol. (LDL+VLDL) 02/27/2025 190 (H)  <130 mg/dL (calc) Final    Comment: For patients with diabetes plus 1 major ASCVD risk   factor, treating to a non-HDL-C goal of <100 mg/dL   (LDL-C of <70 mg/dL) is considered a therapeutic   option.      TSH 02/27/2025 1.51  0.40 - 4.50 mIU/L Final    Color, UA 02/27/2025 YELLOW  YELLOW Final    Appearance, UA 02/27/2025 CLEAR  CLEAR Final    Specific Gravity, UA 02/27/2025 1.018  1.001 - 1.035 Final    pH, UA 02/27/2025 6.0  5.0 - 8.0 Final    Glucose, UA 02/27/2025 NEGATIVE  NEGATIVE Final    Bilirubin, UA 02/27/2025 NEGATIVE  NEGATIVE Final    Ketones, UA 02/27/2025 NEGATIVE  NEGATIVE Final    Occult Blood UA 02/27/2025 NEGATIVE  NEGATIVE Final    Protein, UA 02/27/2025 NEGATIVE  NEGATIVE Final    Nitrite, UA 02/27/2025 NEGATIVE  NEGATIVE Final    Leukocytes, UA 02/27/2025 NEGATIVE  NEGATIVE Final    WBC Casts, UA 02/27/2025 NONE SEEN  < OR = 5 /HPF Final    RBC Casts, UA 02/27/2025 NONE SEEN  < OR = 2 /HPF Final    Squam Epithel, UA 02/27/2025 NONE SEEN  < OR = 5 /HPF Final    Bacteria, UA 02/27/2025 NONE SEEN  NONE SEEN /HPF Final    Hyaline Casts, UA 02/27/2025 NONE SEEN  NONE SEEN /LPF Final    Service Cmt: 02/27/2025 SEE COMMENT   Final    Comment: This urine was  analyzed for the presence of WBC,   RBC, bacteria, casts, and other formed elements.   Only those elements seen were reported.            Reflexive Urine Culture 02/27/2025 SEE COMMENT   Final    NO CULTURE INDICATED    Vitamin D, 25-OH, Total 02/27/2025 39  30 - 100 ng/mL Final    Comment: Vitamin D Status         25-OH Vitamin D:     Deficiency:                    <20 ng/mL  Insufficiency:             20 - 29 ng/mL  Optimal:                 > or = 30 ng/mL     For 25-OH Vitamin D testing on patients on   D2-supplementation and patients for whom quantitation   of D2 and D3 fractions is required, the QuestAssureD(TM)  25-OH VIT D, (D2,D3), LC/MS/MS is recommended: order   code 11415 (patients >2yrs).     See Note 1     Note 1     For additional information, please refer to   http://education.Nanotion/faq/DCM897   (This link is being provided for informational/  educational purposes only.)      Vitamin B-12 02/27/2025 566  200 - 1,100 pg/mL Final      Assessment:       1. Preventative health care    2. Elevated LDL cholesterol level    3. Depression with anxiety    4. ADRIAN (obstructive sleep apnea)    5. BMI 38.0-38.9,adult    6. Gastroesophageal reflux disease, unspecified whether esophagitis present        Plan:       Preventative health care  Counseled on age and gender appropriate medical preventative services, including cancer screenings, immunizations, overall nutritional health, need for a consistent exercise regimen and an overall push towards maintaining a vigorous and active lifestyle.     Elevated LDL cholesterol level  LDL is elevated; recommend high fiber, low fat diet, daily metamucil supplement and daily aerobic exercise    Depression with anxiety  -     venlafaxine (EFFEXOR-XR) 75 MG 24 hr capsule; Take 1 capsule (75 mg total) by mouth once daily.  Dispense: 90 capsule; Refill: 1    ADRIAN (obstructive sleep apnea)  -    start ZEPBOUND 2.5 mg/0.5 mL PnIj; Inject 2.5 mg into the skin every 7  days.  Dispense: 4 Pen; Refill: 0    BMI 38.0-38.9,adult  -     ZEPBOUND 2.5 mg/0.5 mL PnIj; Inject 2.5 mg into the skin every 7 days.  Dispense: 4 Pen; Refill: 0    Gastroesophageal reflux disease, unspecified whether esophagitis present  -     pantoprazole (PROTONIX) 40 MG tablet; Take 1 tablet (40 mg total) by mouth once daily.  Dispense: 90 tablet; Refill: 1      Assessment & Plan    SEVERE OBESITY (BMI 35.0-39.9) WITH COMORBIDITY:  - Swapnil's current weight is 265 lbs, indicating weight gain, with a calculated BMI of 38.  - Noted use of CPAP machine for sleep apnea and difficulty with exercise due to foot and back problems.  - Non-fasting cholesterol levels assessed: total cholesterol 239, HDL 49, triglycerides elevated, .    SLEEP APNEA:  - Initiated ZepBound    HYPERLIPIDEMIA:  - Plan to obtain fasting cholesterol levels at next visit.  - Recommend continuing to work on diet and exercise.    MUSCULOSKELETAL ISSUES:  - Confirmed ongoing follow-up for back issues.    HEADACHES:  - Continued Maxalt as needed for headaches.    WEIGHT MANAGEMENT:  - Discussed weight loss options, including medication.  - Planned to prescribe ZepBound for weight loss if approved by insurance.    LABS:  - Evaluated labs: kidney function, liver function, electrolytes, blood counts, vitamin D (39), B12 (566), and thyroid - all within normal limits.    MEDICATIONS/SUPPLEMENTS:  - Continued Effexor 75 mg, Protonix, OTC supplements, and Minoxidil (prescribed by Dr. Mark).    FOLLOW UP:  - Follow up in 3 months.  - Contact the office if any questions arise, unable to recall discussion details, or if insurance denies ZepBound coverage.  - Follow up sooner if any issues arise with potential new medication.         Follow up in about 3 months (around 6/6/2025) for anxiety/ADRIAN.  This note was generated with the assistance of ambient listening technology. Verbal consent was obtained by the patient and accompanying visitor(s) for the  recording of patient appointment to facilitate this note. I attest to having reviewed and edited the generated note for accuracy, though some syntax or spelling errors may persist. Please contact the author of this note for any clarification.        3/6/2025 TOBIN Combs FNP             [1]   Current Outpatient Medications   Medication Sig Dispense Refill    cyanocobalamin (VITAMIN B-12) 1000 MCG tablet Take 1,000 mcg by mouth.      EPINEPHrine (EPIPEN JR) 0.15 mg/0.3 mL pen injection SMARTSI IM Daily      famotidine (PEPCID) 20 MG tablet TAKE 1 TABLET BY MOUTH EVERY DAY IN THE EVENING 30 tablet 0    finasteride (PROPECIA) 1 mg tablet 1 po daily. 90 tablet 3    minoxidiL (LONITEN) 2.5 MG tablet 1/2 po qd x 7d, then 1 po qd as tolerated 90 tablet 3    naproxen (NAPROSYN) 500 MG tablet Take 1 tablet (500 mg total) by mouth daily as needed (migrain). 30 tablet 0    rizatriptan (MAXALT) 10 MG tablet Take 1 tablet (10 mg total) by mouth as needed for Migraine. 20 tablet 1    vitamin D (VITAMIN D3) 1000 units Tab Take 1,000 Units by mouth.      pantoprazole (PROTONIX) 40 MG tablet Take 1 tablet (40 mg total) by mouth once daily. 90 tablet 1    venlafaxine (EFFEXOR-XR) 75 MG 24 hr capsule Take 1 capsule (75 mg total) by mouth once daily. 90 capsule 1    ZEPBOUND 2.5 mg/0.5 mL PnIj Inject 2.5 mg into the skin every 7 days. 4 Pen 0     Current Facility-Administered Medications   Medication Dose Route Frequency Provider Last Rate Last Admin    methylPREDNISolone acetate injection 40 mg  40 mg Intramuscular 1 time in Clinic/HOD Cuate Sharp MD         Facility-Administered Medications Ordered in Other Visits   Medication Dose Route Frequency Provider Last Rate Last Admin    lactated ringers infusion   Intravenous Continuous Scooter Norris MD 25 mL/hr at 23 0835 New Bag at 23 0835

## 2025-03-06 ENCOUNTER — OFFICE VISIT (OUTPATIENT)
Dept: OPTOMETRY | Facility: CLINIC | Age: 35
End: 2025-03-06
Payer: COMMERCIAL

## 2025-03-06 ENCOUNTER — OFFICE VISIT (OUTPATIENT)
Dept: FAMILY MEDICINE | Facility: CLINIC | Age: 35
End: 2025-03-06
Payer: COMMERCIAL

## 2025-03-06 VITALS
HEIGHT: 70 IN | BODY MASS INDEX: 37.94 KG/M2 | SYSTOLIC BLOOD PRESSURE: 108 MMHG | HEART RATE: 82 BPM | WEIGHT: 265 LBS | DIASTOLIC BLOOD PRESSURE: 70 MMHG | OXYGEN SATURATION: 97 %

## 2025-03-06 DIAGNOSIS — H52.7 REFRACTIVE ERROR: ICD-10-CM

## 2025-03-06 DIAGNOSIS — Z46.0 CONTACT LENS/GLASSES FITTING: Primary | ICD-10-CM

## 2025-03-06 DIAGNOSIS — Z00.00 PREVENTATIVE HEALTH CARE: Primary | ICD-10-CM

## 2025-03-06 DIAGNOSIS — E78.00 ELEVATED LDL CHOLESTEROL LEVEL: ICD-10-CM

## 2025-03-06 DIAGNOSIS — G47.33 OSA (OBSTRUCTIVE SLEEP APNEA): ICD-10-CM

## 2025-03-06 DIAGNOSIS — Z01.00 EXAMINATION OF EYES AND VISION: Primary | ICD-10-CM

## 2025-03-06 DIAGNOSIS — F41.8 DEPRESSION WITH ANXIETY: ICD-10-CM

## 2025-03-06 DIAGNOSIS — K21.9 GASTROESOPHAGEAL REFLUX DISEASE, UNSPECIFIED WHETHER ESOPHAGITIS PRESENT: ICD-10-CM

## 2025-03-06 PROCEDURE — 99395 PREV VISIT EST AGE 18-39: CPT | Mod: S$GLB,,, | Performed by: NURSE PRACTITIONER

## 2025-03-06 PROCEDURE — 1159F MED LIST DOCD IN RCRD: CPT | Mod: CPTII,S$GLB,, | Performed by: NURSE PRACTITIONER

## 2025-03-06 PROCEDURE — 3008F BODY MASS INDEX DOCD: CPT | Mod: CPTII,S$GLB,, | Performed by: NURSE PRACTITIONER

## 2025-03-06 PROCEDURE — 3074F SYST BP LT 130 MM HG: CPT | Mod: CPTII,S$GLB,, | Performed by: NURSE PRACTITIONER

## 2025-03-06 PROCEDURE — 99499 UNLISTED E&M SERVICE: CPT | Mod: ,,, | Performed by: OPTOMETRIST

## 2025-03-06 PROCEDURE — 99999 PR PBB SHADOW E&M-EST. PATIENT-LVL III: CPT | Mod: PBBFAC,,, | Performed by: OPTOMETRIST

## 2025-03-06 PROCEDURE — 92004 COMPRE OPH EXAM NEW PT 1/>: CPT | Mod: S$GLB,,, | Performed by: OPTOMETRIST

## 2025-03-06 PROCEDURE — 3078F DIAST BP <80 MM HG: CPT | Mod: CPTII,S$GLB,, | Performed by: NURSE PRACTITIONER

## 2025-03-06 PROCEDURE — 99999 PR PBB SHADOW E&M-EST. PATIENT-LVL II: CPT | Mod: PBBFAC,,, | Performed by: OPTOMETRIST

## 2025-03-06 PROCEDURE — 92015 DETERMINE REFRACTIVE STATE: CPT | Mod: S$GLB,,, | Performed by: OPTOMETRIST

## 2025-03-06 PROCEDURE — 1160F RVW MEDS BY RX/DR IN RCRD: CPT | Mod: CPTII,S$GLB,, | Performed by: NURSE PRACTITIONER

## 2025-03-06 RX ORDER — TIRZEPATIDE 2.5 MG/.5ML
2.5 INJECTION, SOLUTION SUBCUTANEOUS
Qty: 4 PEN | Refills: 0 | Status: SHIPPED | OUTPATIENT
Start: 2025-03-06

## 2025-03-06 RX ORDER — PANTOPRAZOLE SODIUM 40 MG/1
40 TABLET, DELAYED RELEASE ORAL DAILY
Qty: 90 TABLET | Refills: 1 | Status: SHIPPED | OUTPATIENT
Start: 2025-03-06

## 2025-03-06 RX ORDER — VENLAFAXINE HYDROCHLORIDE 75 MG/1
75 CAPSULE, EXTENDED RELEASE ORAL DAILY
Qty: 90 CAPSULE | Refills: 1 | Status: SHIPPED | OUTPATIENT
Start: 2025-03-06

## 2025-03-06 NOTE — PROGRESS NOTES
HPI     Annual Exam     Additional comments: DLE            Comments    Pt here today for ocular health exam with refraction to update specs &   clrx.     States blurred vision at distance only.    Happy with current contacts -- wearing Biofinity Toric OU.  Wears lenses daily  Never sleeps in lenses  Disposes of every month  Optifree     Denies any headaches or eye pain.    (+) Blink rewetting gtts OU daily  (-) floaters or light flashes          Last edited by Kvng Dinh, OD on 3/6/2025 10:43 AM.            Assessment /Plan     For exam results, see Encounter Report.    Examination of eyes and vision    Refractive error      1. Examination of eyes and vision (Primary)  Good ocular health OU    2. Refractive error  Dispensed updated spectacle Rx. Discussed various spectacle lens options. Discussed adaptation period to new specs.     Discussed cls options -- happy with current brand  Ordered biofinity toric trials, return for dispense

## 2025-03-06 NOTE — PROGRESS NOTES
Assessment /Plan     For exam results, see Encounter Report.    Contact lens/glasses fitting      Patient is here for a comprehensive eye exam and contact lens fit. See other exam visit with same encounter date 03/06/2025 for detailed exam information.

## 2025-03-10 ENCOUNTER — PATIENT MESSAGE (OUTPATIENT)
Dept: FAMILY MEDICINE | Facility: CLINIC | Age: 35
End: 2025-03-10
Payer: COMMERCIAL

## 2025-03-18 ENCOUNTER — TELEPHONE (OUTPATIENT)
Dept: OPTOMETRY | Facility: CLINIC | Age: 35
End: 2025-03-18
Payer: COMMERCIAL

## 2025-03-21 ENCOUNTER — OFFICE VISIT (OUTPATIENT)
Dept: OPTOMETRY | Facility: CLINIC | Age: 35
End: 2025-03-21
Payer: COMMERCIAL

## 2025-03-21 DIAGNOSIS — Z97.3 WEARS CONTACT LENSES: Primary | ICD-10-CM

## 2025-03-21 NOTE — PROGRESS NOTES
"HPI    Pt here today for cls follow up.   Had pt insert trials OU.  States good fit & comfort physically but still adjusting visually, getting   "fish bowl" effect.  Last edited by Meenu Chisholm on 3/21/2025  2:49 PM.            Assessment /Plan     For exam results, see Encounter Report.    Wears contact lenses      Good clfu -- adjusting to new morales  Dispensed CLs trials: Biofinity Toric. Daily wear only, dispose of monthly.   Discussed proper hand hygiene and wear/care of lenses. Do not sleep/swim/shower in lenses.   Discontinue CL wear ASAP and RTC if any redness or discomfort occurs.   Try x 1 week, report back with progress                   "

## 2025-03-25 ENCOUNTER — PATIENT MESSAGE (OUTPATIENT)
Dept: OPTOMETRY | Facility: CLINIC | Age: 35
End: 2025-03-25
Payer: COMMERCIAL

## 2025-08-01 ENCOUNTER — PATIENT MESSAGE (OUTPATIENT)
Dept: FAMILY MEDICINE | Facility: CLINIC | Age: 35
End: 2025-08-01
Payer: COMMERCIAL

## 2025-08-01 DIAGNOSIS — F41.8 DEPRESSION WITH ANXIETY: ICD-10-CM

## 2025-08-04 RX ORDER — VENLAFAXINE HYDROCHLORIDE 75 MG/1
75 CAPSULE, EXTENDED RELEASE ORAL DAILY
Qty: 90 CAPSULE | Refills: 1 | Status: SHIPPED | OUTPATIENT
Start: 2025-08-04

## (undated) DEVICE — TUBING MINIBORE EXTENSION

## (undated) DEVICE — NDL HYPODERMIC BLUNT 18G 1.5IN

## (undated) DEVICE — CHLORAPREP 10.5 ML APPLICATOR

## (undated) DEVICE — NDL TUOHY EPIDURAL 20G X 3.5

## (undated) DEVICE — SYR GLASS 5CC LUER LOK

## (undated) DEVICE — NDL SAFETY 25G X 1.5 ECLIPSE

## (undated) DEVICE — SYR DISP LL 5CC

## (undated) DEVICE — GLOVE SURG ULTRA TOUCH 7.5

## (undated) DEVICE — GLOVE SENSICARE PI GRN 7.5

## (undated) DEVICE — SYS LABEL CORRECT MED

## (undated) DEVICE — SKIN MARKER STER DUAL TIP